# Patient Record
Sex: FEMALE | Race: BLACK OR AFRICAN AMERICAN | Employment: OTHER | URBAN - NONMETROPOLITAN AREA
[De-identification: names, ages, dates, MRNs, and addresses within clinical notes are randomized per-mention and may not be internally consistent; named-entity substitution may affect disease eponyms.]

---

## 2018-05-18 ENCOUNTER — HOSPITAL ENCOUNTER (OUTPATIENT)
Dept: WOMENS IMAGING | Age: 73
Discharge: HOME | End: 2018-05-18
Payer: MEDICARE

## 2018-05-18 DIAGNOSIS — Z12.31 VISIT FOR SCREENING MAMMOGRAM: ICD-10-CM

## 2018-05-18 PROCEDURE — 77063 BREAST TOMOSYNTHESIS BI: CPT

## 2018-05-24 ENCOUNTER — HOSPITAL ENCOUNTER (OUTPATIENT)
Dept: WOMENS IMAGING | Age: 73
Discharge: HOME | End: 2018-05-24
Payer: MEDICARE

## 2018-05-24 DIAGNOSIS — R92.1 BREAST CALCIFICATIONS: ICD-10-CM

## 2018-05-24 PROCEDURE — 77065 DX MAMMO INCL CAD UNI: CPT

## 2018-06-14 ENCOUNTER — HOSPITAL ENCOUNTER (OUTPATIENT)
Dept: WOMENS IMAGING | Age: 73
Discharge: HOME | End: 2018-06-14
Payer: MEDICARE

## 2018-06-14 VITALS
DIASTOLIC BLOOD PRESSURE: 78 MMHG | TEMPERATURE: 98 F | SYSTOLIC BLOOD PRESSURE: 141 MMHG | RESPIRATION RATE: 16 BRPM | HEART RATE: 71 BPM

## 2018-06-14 DIAGNOSIS — R92.1 CALCIFICATION OF RIGHT BREAST: ICD-10-CM

## 2018-06-14 PROCEDURE — G0279 TOMOSYNTHESIS, MAMMO: HCPCS

## 2018-06-14 PROCEDURE — A4648 IMPLANTABLE TISSUE MARKER: HCPCS

## 2018-06-14 PROCEDURE — 88305 TISSUE EXAM BY PATHOLOGIST: CPT

## 2018-06-14 PROCEDURE — 19081 BX BREAST 1ST LESION STRTCTC: CPT

## 2018-06-14 PROCEDURE — 2720000010 HC SURG SUPPLY STERILE

## 2019-04-12 ENCOUNTER — HOSPITAL ENCOUNTER (OUTPATIENT)
Age: 74
Setting detail: SPECIMEN
Discharge: HOME | End: 2019-04-12
Payer: MEDICARE

## 2019-04-12 LAB
ABSOLUTE EOS #: 0.03 K/UL (ref 0–0.44)
ABSOLUTE IMMATURE GRANULOCYTE: <0.03 K/UL (ref 0–0.3)
ABSOLUTE LYMPH #: 1.82 K/UL (ref 1.1–3.7)
ABSOLUTE MONO #: 0.31 K/UL (ref 0.1–1.2)
ALBUMIN SERPL-MCNC: 4.3 G/DL (ref 3.5–5.2)
ALBUMIN/GLOBULIN RATIO: 0.8 (ref 1–2.5)
ALP BLD-CCNC: 114 U/L (ref 35–104)
ALT SERPL-CCNC: 30 U/L (ref 5–33)
ANION GAP SERPL CALCULATED.3IONS-SCNC: 12 MMOL/L (ref 9–17)
AST SERPL-CCNC: 62 U/L
BASOPHILS # BLD: 0 % (ref 0–2)
BASOPHILS ABSOLUTE: <0.03 K/UL (ref 0–0.2)
BILIRUB SERPL-MCNC: 0.27 MG/DL (ref 0.3–1.2)
BUN BLDV-MCNC: 10 MG/DL (ref 8–23)
BUN/CREAT BLD: ABNORMAL (ref 9–20)
CALCIUM SERPL-MCNC: 9.7 MG/DL (ref 8.6–10.4)
CHLORIDE BLD-SCNC: 102 MMOL/L (ref 98–107)
CHOLESTEROL/HDL RATIO: 2.1
CHOLESTEROL: 178 MG/DL
CO2: 26 MMOL/L (ref 20–31)
CREAT SERPL-MCNC: 0.79 MG/DL (ref 0.5–0.9)
DIFFERENTIAL TYPE: ABNORMAL
EOSINOPHILS RELATIVE PERCENT: 1 % (ref 1–4)
GFR AFRICAN AMERICAN: >60 ML/MIN
GFR NON-AFRICAN AMERICAN: >60 ML/MIN
GFR SERPL CREATININE-BSD FRML MDRD: ABNORMAL ML/MIN/{1.73_M2}
GFR SERPL CREATININE-BSD FRML MDRD: ABNORMAL ML/MIN/{1.73_M2}
GLUCOSE BLD-MCNC: 94 MG/DL (ref 70–99)
HCT VFR BLD CALC: 46.9 % (ref 36.3–47.1)
HDLC SERPL-MCNC: 83 MG/DL
HEMOGLOBIN: 14.5 G/DL (ref 11.9–15.1)
IMMATURE GRANULOCYTES: 0 %
LDL CHOLESTEROL: 81 MG/DL (ref 0–130)
LYMPHOCYTES # BLD: 40 % (ref 24–43)
MCH RBC QN AUTO: 29.7 PG (ref 25.2–33.5)
MCHC RBC AUTO-ENTMCNC: 30.9 G/DL (ref 28.4–34.8)
MCV RBC AUTO: 95.9 FL (ref 82.6–102.9)
MONOCYTES # BLD: 7 % (ref 3–12)
NRBC AUTOMATED: 0 PER 100 WBC
PDW BLD-RTO: 15.8 % (ref 11.8–14.4)
PLATELET # BLD: ABNORMAL K/UL (ref 138–453)
PLATELET ESTIMATE: ABNORMAL
PLATELET, FLUORESCENCE: 94 K/UL (ref 138–453)
PLATELET, IMMATURE FRACTION: 12.9 % (ref 1.1–10.3)
PMV BLD AUTO: ABNORMAL FL (ref 8.1–13.5)
POTASSIUM SERPL-SCNC: 4.9 MMOL/L (ref 3.7–5.3)
RBC # BLD: 4.89 M/UL (ref 3.95–5.11)
RBC # BLD: ABNORMAL 10*6/UL
SEG NEUTROPHILS: 52 % (ref 36–65)
SEGMENTED NEUTROPHILS ABSOLUTE COUNT: 2.35 K/UL (ref 1.5–8.1)
SODIUM BLD-SCNC: 140 MMOL/L (ref 135–144)
TOTAL PROTEIN: 9.6 G/DL (ref 6.4–8.3)
TRIGL SERPL-MCNC: 69 MG/DL
TSH SERPL DL<=0.05 MIU/L-ACNC: 1.7 MIU/L (ref 0.3–5)
VLDLC SERPL CALC-MCNC: NORMAL MG/DL (ref 1–30)
WBC # BLD: 4.5 K/UL (ref 3.5–11.3)
WBC # BLD: ABNORMAL 10*3/UL

## 2019-05-30 ENCOUNTER — HOSPITAL ENCOUNTER (OUTPATIENT)
Dept: WOMENS IMAGING | Age: 74
Discharge: HOME | End: 2019-05-30
Payer: MEDICARE

## 2019-05-30 DIAGNOSIS — Z12.31 VISIT FOR SCREENING MAMMOGRAM: ICD-10-CM

## 2019-05-30 PROCEDURE — 77063 BREAST TOMOSYNTHESIS BI: CPT

## 2019-06-06 ENCOUNTER — APPOINTMENT (OUTPATIENT)
Dept: WOMENS IMAGING | Age: 74
End: 2019-06-06
Payer: MEDICARE

## 2019-06-06 ENCOUNTER — HOSPITAL ENCOUNTER (OUTPATIENT)
Dept: WOMENS IMAGING | Age: 74
Discharge: HOME | End: 2019-06-06
Payer: MEDICARE

## 2019-06-06 DIAGNOSIS — R92.2 BREAST DENSITY: ICD-10-CM

## 2019-06-06 PROCEDURE — G0279 TOMOSYNTHESIS, MAMMO: HCPCS

## 2019-10-11 ENCOUNTER — HOSPITAL ENCOUNTER (OUTPATIENT)
Age: 74
Setting detail: SPECIMEN
Discharge: HOME | End: 2019-10-11
Payer: MEDICARE

## 2019-10-11 LAB
ABSOLUTE EOS #: <0.03 K/UL (ref 0–0.44)
ABSOLUTE IMMATURE GRANULOCYTE: <0.03 K/UL (ref 0–0.3)
ABSOLUTE LYMPH #: 1.72 K/UL (ref 1.1–3.7)
ABSOLUTE MONO #: 0.42 K/UL (ref 0.1–1.2)
ANION GAP SERPL CALCULATED.3IONS-SCNC: 12 MMOL/L (ref 9–17)
BASOPHILS # BLD: 0 % (ref 0–2)
BASOPHILS ABSOLUTE: <0.03 K/UL (ref 0–0.2)
BUN BLDV-MCNC: 8 MG/DL (ref 8–23)
BUN/CREAT BLD: NORMAL (ref 9–20)
CALCIUM SERPL-MCNC: 9.3 MG/DL (ref 8.6–10.4)
CHLORIDE BLD-SCNC: 103 MMOL/L (ref 98–107)
CHOLESTEROL/HDL RATIO: 2.6
CHOLESTEROL: 183 MG/DL
CO2: 25 MMOL/L (ref 20–31)
CREAT SERPL-MCNC: 0.77 MG/DL (ref 0.5–0.9)
DIFFERENTIAL TYPE: ABNORMAL
EOSINOPHILS RELATIVE PERCENT: 0 % (ref 1–4)
GFR AFRICAN AMERICAN: >60 ML/MIN
GFR NON-AFRICAN AMERICAN: >60 ML/MIN
GFR SERPL CREATININE-BSD FRML MDRD: NORMAL ML/MIN/{1.73_M2}
GFR SERPL CREATININE-BSD FRML MDRD: NORMAL ML/MIN/{1.73_M2}
GLUCOSE BLD-MCNC: 88 MG/DL (ref 70–99)
HCT VFR BLD CALC: 43.9 % (ref 36.3–47.1)
HDLC SERPL-MCNC: 71 MG/DL
HEMOGLOBIN: 13.4 G/DL (ref 11.9–15.1)
IMMATURE GRANULOCYTES: 0 %
LDL CHOLESTEROL: 94 MG/DL (ref 0–130)
LYMPHOCYTES # BLD: 35 % (ref 24–43)
MCH RBC QN AUTO: 29.6 PG (ref 25.2–33.5)
MCHC RBC AUTO-ENTMCNC: 30.5 G/DL (ref 28.4–34.8)
MCV RBC AUTO: 96.9 FL (ref 82.6–102.9)
MONOCYTES # BLD: 9 % (ref 3–12)
NRBC AUTOMATED: 0 PER 100 WBC
PDW BLD-RTO: 16.1 % (ref 11.8–14.4)
PLATELET # BLD: ABNORMAL K/UL (ref 138–453)
PLATELET ESTIMATE: ABNORMAL
PLATELET, FLUORESCENCE: 88 K/UL (ref 138–453)
PLATELET, IMMATURE FRACTION: 13.4 % (ref 1.1–10.3)
PMV BLD AUTO: ABNORMAL FL (ref 8.1–13.5)
POTASSIUM SERPL-SCNC: 4.3 MMOL/L (ref 3.7–5.3)
RBC # BLD: 4.53 M/UL (ref 3.95–5.11)
RBC # BLD: ABNORMAL 10*6/UL
SEG NEUTROPHILS: 56 % (ref 36–65)
SEGMENTED NEUTROPHILS ABSOLUTE COUNT: 2.74 K/UL (ref 1.5–8.1)
SODIUM BLD-SCNC: 140 MMOL/L (ref 135–144)
TRIGL SERPL-MCNC: 90 MG/DL
TSH SERPL DL<=0.05 MIU/L-ACNC: 1.58 MIU/L (ref 0.3–5)
VLDLC SERPL CALC-MCNC: NORMAL MG/DL (ref 1–30)
WBC # BLD: 4.9 K/UL (ref 3.5–11.3)
WBC # BLD: ABNORMAL 10*3/UL

## 2021-07-15 ENCOUNTER — HOSPITAL ENCOUNTER (OUTPATIENT)
Age: 76
Setting detail: SPECIMEN
Discharge: HOME | End: 2021-07-15
Payer: MEDICARE

## 2021-07-15 LAB
ABSOLUTE EOS #: <0.03 K/UL (ref 0–0.44)
ABSOLUTE IMMATURE GRANULOCYTE: <0.03 K/UL (ref 0–0.3)
ABSOLUTE LYMPH #: 1.53 K/UL (ref 1.1–3.7)
ABSOLUTE MONO #: 0.32 K/UL (ref 0.1–1.2)
ALBUMIN SERPL-MCNC: 3.8 G/DL (ref 3.5–5.2)
ALBUMIN/GLOBULIN RATIO: 0.7 (ref 1–2.5)
ALP BLD-CCNC: 103 U/L (ref 35–104)
ALT SERPL-CCNC: 14 U/L (ref 5–33)
ANION GAP SERPL CALCULATED.3IONS-SCNC: 10 MMOL/L (ref 9–17)
AST SERPL-CCNC: 35 U/L
BASOPHILS # BLD: 1 % (ref 0–2)
BASOPHILS ABSOLUTE: <0.03 K/UL (ref 0–0.2)
BILIRUB SERPL-MCNC: 0.4 MG/DL (ref 0.3–1.2)
BILIRUBIN DIRECT: 0.11 MG/DL
BILIRUBIN, INDIRECT: 0.29 MG/DL (ref 0–1)
BUN BLDV-MCNC: 12 MG/DL (ref 8–23)
BUN/CREAT BLD: ABNORMAL (ref 9–20)
CALCIUM SERPL-MCNC: 9.7 MG/DL (ref 8.6–10.4)
CHLORIDE BLD-SCNC: 100 MMOL/L (ref 98–107)
CHOLESTEROL/HDL RATIO: 2.7
CHOLESTEROL: 193 MG/DL
CO2: 25 MMOL/L (ref 20–31)
CREAT SERPL-MCNC: 1.16 MG/DL (ref 0.5–0.9)
DIFFERENTIAL TYPE: ABNORMAL
EOSINOPHILS RELATIVE PERCENT: 1 % (ref 1–4)
GFR AFRICAN AMERICAN: 55 ML/MIN
GFR NON-AFRICAN AMERICAN: 46 ML/MIN
GFR SERPL CREATININE-BSD FRML MDRD: ABNORMAL ML/MIN/{1.73_M2}
GFR SERPL CREATININE-BSD FRML MDRD: ABNORMAL ML/MIN/{1.73_M2}
GLOBULIN: ABNORMAL G/DL (ref 1.5–3.8)
GLUCOSE BLD-MCNC: 88 MG/DL (ref 70–99)
HCT VFR BLD CALC: 45.3 % (ref 36.3–47.1)
HDLC SERPL-MCNC: 72 MG/DL
HEMOGLOBIN: 13.7 G/DL (ref 11.9–15.1)
HIV AG/AB: NONREACTIVE
IMMATURE GRANULOCYTES: 0 %
LDL CHOLESTEROL: 98 MG/DL (ref 0–130)
LYMPHOCYTES # BLD: 38 % (ref 24–43)
MCH RBC QN AUTO: 29.3 PG (ref 25.2–33.5)
MCHC RBC AUTO-ENTMCNC: 30.2 G/DL (ref 28.4–34.8)
MCV RBC AUTO: 96.8 FL (ref 82.6–102.9)
MONOCYTES # BLD: 8 % (ref 3–12)
NRBC AUTOMATED: 0 PER 100 WBC
PDW BLD-RTO: 15.4 % (ref 11.8–14.4)
PLATELET # BLD: ABNORMAL K/UL (ref 138–453)
PLATELET ESTIMATE: ABNORMAL
PLATELET, FLUORESCENCE: 79 K/UL (ref 138–453)
PLATELET, IMMATURE FRACTION: 13.9 % (ref 1.1–10.3)
PMV BLD AUTO: ABNORMAL FL (ref 8.1–13.5)
POTASSIUM SERPL-SCNC: 4.7 MMOL/L (ref 3.7–5.3)
RBC # BLD: 4.68 M/UL (ref 3.95–5.11)
RBC # BLD: ABNORMAL 10*6/UL
SEDIMENTATION RATE, ERYTHROCYTE: 77 MM (ref 0–30)
SEG NEUTROPHILS: 53 % (ref 36–65)
SEGMENTED NEUTROPHILS ABSOLUTE COUNT: 2.16 K/UL (ref 1.5–8.1)
SODIUM BLD-SCNC: 135 MMOL/L (ref 135–144)
TOTAL PROTEIN: 9.2 G/DL (ref 6.4–8.3)
TRIGL SERPL-MCNC: 115 MG/DL
TSH SERPL DL<=0.05 MIU/L-ACNC: 1.25 MIU/L (ref 0.3–5)
VLDLC SERPL CALC-MCNC: NORMAL MG/DL (ref 1–30)
WBC # BLD: 4.1 K/UL (ref 3.5–11.3)
WBC # BLD: ABNORMAL 10*3/UL

## 2021-08-19 ENCOUNTER — OFFICE VISIT (OUTPATIENT)
Dept: NEPHROLOGY | Age: 76
End: 2021-08-19
Payer: MEDICARE

## 2021-08-19 VITALS
BODY MASS INDEX: 19.06 KG/M2 | WEIGHT: 107.6 LBS | DIASTOLIC BLOOD PRESSURE: 97 MMHG | OXYGEN SATURATION: 94 % | TEMPERATURE: 98 F | SYSTOLIC BLOOD PRESSURE: 167 MMHG | HEART RATE: 90 BPM

## 2021-08-19 DIAGNOSIS — N18.31 STAGE 3A CHRONIC KIDNEY DISEASE (HCC): Primary | ICD-10-CM

## 2021-08-19 DIAGNOSIS — I10 ESSENTIAL HYPERTENSION: ICD-10-CM

## 2021-08-19 PROCEDURE — 4040F PNEUMOC VAC/ADMIN/RCVD: CPT | Performed by: INTERNAL MEDICINE

## 2021-08-19 PROCEDURE — 4004F PT TOBACCO SCREEN RCVD TLK: CPT | Performed by: INTERNAL MEDICINE

## 2021-08-19 PROCEDURE — G8427 DOCREV CUR MEDS BY ELIG CLIN: HCPCS | Performed by: INTERNAL MEDICINE

## 2021-08-19 PROCEDURE — 1123F ACP DISCUSS/DSCN MKR DOCD: CPT | Performed by: INTERNAL MEDICINE

## 2021-08-19 PROCEDURE — G8400 PT W/DXA NO RESULTS DOC: HCPCS | Performed by: INTERNAL MEDICINE

## 2021-08-19 PROCEDURE — 3017F COLORECTAL CA SCREEN DOC REV: CPT | Performed by: INTERNAL MEDICINE

## 2021-08-19 PROCEDURE — G8420 CALC BMI NORM PARAMETERS: HCPCS | Performed by: INTERNAL MEDICINE

## 2021-08-19 PROCEDURE — 1090F PRES/ABSN URINE INCON ASSESS: CPT | Performed by: INTERNAL MEDICINE

## 2021-08-19 PROCEDURE — 99204 OFFICE O/P NEW MOD 45 MIN: CPT | Performed by: INTERNAL MEDICINE

## 2021-08-19 RX ORDER — POLYETHYLENE GLYCOL 3350 17 G
POWDER IN PACKET (EA) ORAL
COMMUNITY
Start: 2021-07-15

## 2021-08-19 RX ORDER — HYDROXYZINE PAMOATE 25 MG/1
CAPSULE ORAL
COMMUNITY
Start: 2021-07-15

## 2021-08-19 NOTE — PROGRESS NOTES
Nephrology Consult Note  Patient's Name: Alex Murray  2:57 PM  8/19/2021    Nephrologist: Tia Lawrence    Reason for Consult:  Elevated serum creatinine level  Requesting Physician:  No att. providers found  PCP: JARROD Ramirez NP    Chief Complaint: None  Assessment   Diagnosis Orders   1. Stage 3a chronic kidney disease (HCC)  Basic Metabolic Panel    Vitamin D 25 Hydroxy    PTH, Intact    Protein / creatinine ratio, urine   2. Essential hypertension           Plan    1. I discussed my thoughts at length with the patient. 2. She appeared to have understood. 3. Addressed her questions  4. She likely has chronic kidney disease from hypertensive nephrosclerosis. .  There may be some prerenal component. 5. We will check markers of chronicity. 6. Random urine protein creatinine ratio. 7. Baseline kidney ultrasound. 8. Avoid any nonsteroidal anti-inflammatory drugs. 9. List provided to the patient. 10. I reassured her that her chronic kidney disease is mild and her kidney function is expected to remain the same  11. Return visit in 4 weeks with a repeat BMP. 12.  I did advise her to increase her fluid intake. 13. Return visit in 4 weeks       History Obtained From:  patient  History of Present Ilness:    Alex Murray is a 77 y.o. female with Ms. Saira Ornelas with history of hypertension going back to the 1970s referred to our office because of elevated serum creatinine level. Review of her record reveals serum creatinine ranging between 0.77 mg/dL up to 0.8 mg/dL since July 2016. On 15 July 2021 BMP was done that revealed a serum creatinine of 1.16 mg/dL. The normal for the lab is up to 0.9 mg/dL. GFR also declined from 60mg to 46 mL/min. As a result she was asked to see us. No chest pain. She does have shortness of breath with activity relieved by rest.  No difficulties with urination. She is currently on amlodipine and benazepril which she has been for several years now.   Blood pressure was quite high on arrival here. Blood pressure now is improved with systolic of 329 and diastolic of  90 mmHg. ..  . Past Medical History:   Diagnosis Date    Hypertension        Past Surgical History:   Procedure Laterality Date    CHOLECYSTECTOMY      COLONOSCOPY      TUBAL LIGATION         Family History   Problem Relation Age of Onset    Hypertension Sister     Hypertension Brother     Cancer Neg Hx     Breast Cancer Neg Hx     Colon Cancer Neg Hx         reports that she has been smoking cigarettes. She has been smoking about 0.25 packs per day. She has never used smokeless tobacco. She reports that she does not drink alcohol and does not use drugs. Allergies:  Patient has no known allergies. Current Medications:    Current Outpatient Medications   Medication Sig Dispense Refill    hydrOXYzine (VISTARIL) 25 MG capsule TAKE 1 CAPSULE BY MOUTH EVERY 8 HOURS AS NEEDED FOR ANXIETY AND FOR SLEEP      nicotine polacrilex (EQ NICOTINE) 4 MG lozenge Take by mouth      albuterol sulfate HFA (VENTOLIN HFA) 108 (90 BASE) MCG/ACT inhaler Inhale 1-2 puffs into the lungs every 6 hours as needed for Wheezing 1 Inhaler 3    amLODIPine (NORVASC) 5 MG tablet Take 5 mg by mouth daily      benazepril (LOTENSIN) 20 MG tablet Take 20 mg by mouth daily      aspirin 81 MG chewable tablet Take 81 mg by mouth daily       No current facility-administered medications for this visit. No current facility-administered medications for this visit. Review of Systems:   Review of Systems   Pertinent positives stated above in HPI. All other systems were reviewed and were negative. ROS:As in HPI on the last patient showed up    Physical exam:   Physical Exam   Constitutional:  Well developed elderly lady in no distress  Vitals:   Skin: no rash, turgor is normal  Heent: Pupils are reactive to light. Throat is clear.   Oral mucosa is moist.  Neck: no bruits or jvd noted   Cardiovascular:  S1, S2 without murmur   Respiratory: Clear with no wheezes,rhonchi or rales   Abdomen: soft,non tender,good bowel sound and no palpable mass  Ext: No lower extremity edema  Psychiatric: mood and affect appropriate  Musculoskeletal:  Rom, muscular strength intact   CNS: Very awake and very alert. Well-oriented. Normal speech. Good motor strength. No obvious focal deficit. Data:   Labs:  Lab Results   Component Value Date     07/15/2021     10/11/2019     04/12/2019    K 4.7 07/15/2021    K 4.3 10/11/2019    K 4.9 04/12/2019     07/15/2021    CO2 25 07/15/2021    CO2 25 10/11/2019    CO2 26 04/12/2019    CREATININE 1.16 (H) 07/15/2021    CREATININE 0.77 10/11/2019    CREATININE 0.79 04/12/2019    BUN 12 07/15/2021    BUN 8 10/11/2019    BUN 10 04/12/2019    GLUCOSE 88 07/15/2021    GLUCOSE 88 10/11/2019    GLUCOSE 94 04/12/2019    WBC 4.1 07/15/2021    WBC 4.9 10/11/2019    WBC 4.5 04/12/2019    HGB 13.7 07/15/2021    HGB 13.4 10/11/2019    HGB 14.5 04/12/2019    HCT 45.3 07/15/2021    HCT 43.9 10/11/2019    HCT 46.9 04/12/2019    MCV 96.8 07/15/2021    PLT See Reflexed IPF Result 07/15/2021     {Labs reviewed    Imaging:  CXR results: Thank you JARROD Maza - NP for allowing us to participate in care of Marva Lewis   **This report has been created using voice recognition software. It maycontain minor  errors which are inherent in voice recognition technology. **    Electronically signed by Narayan Corcoran MD on 8/19/2021 at 2:57 PM

## 2021-09-09 ENCOUNTER — HOSPITAL ENCOUNTER (OUTPATIENT)
Age: 76
Discharge: HOME | End: 2021-09-09
Payer: MEDICARE

## 2021-09-09 DIAGNOSIS — N18.31 STAGE 3A CHRONIC KIDNEY DISEASE (HCC): ICD-10-CM

## 2021-09-09 LAB
ANION GAP SERPL CALCULATED.3IONS-SCNC: 8 MEQ/L (ref 8–16)
BUN BLDV-MCNC: 12 MG/DL (ref 7–22)
CALCIUM SERPL-MCNC: 9.9 MG/DL (ref 8.5–10.5)
CHLORIDE BLD-SCNC: 101 MEQ/L (ref 98–111)
CO2: 28 MEQ/L (ref 23–33)
CREAT SERPL-MCNC: 1.2 MG/DL (ref 0.4–1.2)
CREATININE URINE: 120.7 MG/DL
GLUCOSE BLD-MCNC: 109 MG/DL (ref 70–108)
POTASSIUM SERPL-SCNC: 4.4 MEQ/L (ref 3.5–5.2)
PROT/CREAT RATIO, UR: 0.32
PROTEIN, URINE: 38.1 MG/DL
PTH INTACT: 48.4 PG/ML (ref 15–65)
SODIUM BLD-SCNC: 137 MEQ/L (ref 135–145)
VITAMIN D 25-HYDROXY: 27 NG/ML (ref 30–100)

## 2021-09-09 PROCEDURE — 82570 ASSAY OF URINE CREATININE: CPT

## 2021-09-09 PROCEDURE — 83970 ASSAY OF PARATHORMONE: CPT

## 2021-09-09 PROCEDURE — 84156 ASSAY OF PROTEIN URINE: CPT

## 2021-09-09 PROCEDURE — 36415 COLL VENOUS BLD VENIPUNCTURE: CPT

## 2021-09-09 PROCEDURE — 82306 VITAMIN D 25 HYDROXY: CPT

## 2021-09-09 PROCEDURE — 80048 BASIC METABOLIC PNL TOTAL CA: CPT

## 2021-09-16 ENCOUNTER — OFFICE VISIT (OUTPATIENT)
Dept: NEPHROLOGY | Age: 76
End: 2021-09-16
Payer: MEDICARE

## 2021-09-16 VITALS
BODY MASS INDEX: 18.6 KG/M2 | DIASTOLIC BLOOD PRESSURE: 90 MMHG | SYSTOLIC BLOOD PRESSURE: 159 MMHG | OXYGEN SATURATION: 98 % | TEMPERATURE: 98.3 F | WEIGHT: 105 LBS | HEART RATE: 90 BPM

## 2021-09-16 DIAGNOSIS — I10 ESSENTIAL HYPERTENSION: ICD-10-CM

## 2021-09-16 DIAGNOSIS — E55.9 VITAMIN D DEFICIENCY: ICD-10-CM

## 2021-09-16 DIAGNOSIS — N18.31 STAGE 3A CHRONIC KIDNEY DISEASE (HCC): Primary | ICD-10-CM

## 2021-09-16 PROCEDURE — 4004F PT TOBACCO SCREEN RCVD TLK: CPT | Performed by: INTERNAL MEDICINE

## 2021-09-16 PROCEDURE — 3017F COLORECTAL CA SCREEN DOC REV: CPT | Performed by: INTERNAL MEDICINE

## 2021-09-16 PROCEDURE — G8427 DOCREV CUR MEDS BY ELIG CLIN: HCPCS | Performed by: INTERNAL MEDICINE

## 2021-09-16 PROCEDURE — 1123F ACP DISCUSS/DSCN MKR DOCD: CPT | Performed by: INTERNAL MEDICINE

## 2021-09-16 PROCEDURE — 1090F PRES/ABSN URINE INCON ASSESS: CPT | Performed by: INTERNAL MEDICINE

## 2021-09-16 PROCEDURE — 4040F PNEUMOC VAC/ADMIN/RCVD: CPT | Performed by: INTERNAL MEDICINE

## 2021-09-16 PROCEDURE — G8420 CALC BMI NORM PARAMETERS: HCPCS | Performed by: INTERNAL MEDICINE

## 2021-09-16 PROCEDURE — 99213 OFFICE O/P EST LOW 20 MIN: CPT | Performed by: INTERNAL MEDICINE

## 2021-09-16 PROCEDURE — G8400 PT W/DXA NO RESULTS DOC: HCPCS | Performed by: INTERNAL MEDICINE

## 2021-09-16 NOTE — PROGRESS NOTES
Renal Progress Note    Assessment and Plan:      Diagnosis Orders   1. Stage 3a chronic kidney disease ([facility] [address]     2. Essential hypertension     3. Vitamin D deficiency       PLAN:  I discussed my thoughts with the patient. She understood. Lab result reviewed with her in epic together  Serum creatinine is stable at 1.2 mg/dL  Vitamin D level is slightly low at 27. PTH is normal at 48.4. Kidney ultrasound was not done as instructed. We will reschedule her for such and call her with the report when available  Vitamin D3 over-the-counter 2000 international unit 1 capsule a day. Printed instruction provided to the patient. Return visit in 12 months    Patient Active Problem List   Diagnosis    Anxiety disorder    Chronic obstructive pulmonary disease ([facility] [address]    Essential hypertension           Subjective:   Chief complaint:  Chief Complaint   Patient presents with    Chronic Kidney Disease     Stage IIIb      HPI:This is a follow up visit for Ms. Caren Weaver who is here today for return appointment. I saw her about 4 weeks ago for chronic kidney disease. Doing well since then with no complaint. .  Denies any chest pain. No shortness of breath. No nausea or vomiting. No fever chills. No headaches. Blood pressure is high here in the office today but generally is within the acceptable range    ROS:  Pertinent positives stated above in HPI. All other systems were reviewed and were negative.   Medications:     Current Outpatient Medications   Medication Sig Dispense Refill    hydrOXYzine (VISTARIL) 25 MG capsule TAKE 1 CAPSULE BY MOUTH EVERY 8 HOURS AS NEEDED FOR ANXIETY AND FOR SLEEP      nicotine polacrilex (EQ NICOTINE) 4 MG lozenge Take by mouth      albuterol sulfate HFA (VENTOLIN HFA) 108 (90 BASE) MCG/ACT inhaler Inhale 1-2 puffs into the lungs every 6 hours as needed for Wheezing 1 Inhaler 3    amLODIPine (NORVASC) 5 MG tablet Take 5 mg by mouth daily      benazepril (LOTENSIN) 20 MG tablet Take 20 mg by mouth daily      aspirin 81 MG chewable tablet Take 81 mg by mouth daily       No current facility-administered medications for this visit.        Lab Results:    CBC:   Lab Results   Component Value Date    WBC 4.1 07/15/2021    HGB 13.7 07/15/2021    HCT 45.3 07/15/2021    MCV 96.8 07/15/2021    PLT See Reflexed IPF Result 07/15/2021     BMP:    Lab Results   Component Value Date     09/09/2021     07/15/2021     10/11/2019    K 4.4 09/09/2021    K 4.7 07/15/2021    K 4.3 10/11/2019     09/09/2021     07/15/2021     10/11/2019    CO2 28 09/09/2021    CO2 25 07/15/2021    CO2 25 10/11/2019    BUN 12 09/09/2021    BUN 12 07/15/2021    BUN 8 10/11/2019    CREATININE 1.2 09/09/2021    CREATININE 1.16 (H) 07/15/2021    CREATININE 0.77 10/11/2019    GLUCOSE 109 (H) 09/09/2021    GLUCOSE 88 07/15/2021    GLUCOSE 88 10/11/2019      Hepatic:   Lab Results   Component Value Date    AST 35 (H) 07/15/2021    AST 62 (H) 04/12/2019    ALT 14 07/15/2021    ALT 30 04/12/2019    BILITOT 0.40 07/15/2021    BILITOT 0.27 (L) 04/12/2019    ALKPHOS 103 07/15/2021    ALKPHOS 114 (H) 04/12/2019     BNP: No results found for: BNP  Lipids:   Lab Results   Component Value Date    CHOL 193 07/15/2021    HDL 72 07/15/2021     INR: No results found for: INR  URINE:   Lab Results   Component Value Date    PROTUR 38.1 09/09/2021     Lab Results   Component Value Date    NITRU NEGATIVE 11/14/2016    COLORU YELLOW 11/14/2016    PHUR 6.5 11/14/2016    LEUKOCYTESUR NEGATIVE 11/14/2016    UROBILINOGEN 0.2 11/14/2016    BILIRUBINUR NEGATIVE 11/14/2016    BLOODU NEGATIVE 11/14/2016    GLUCOSEU NEGATIVE 11/14/2016    KETUA NEGATIVE 11/14/2016      Microalbumen/Creatinine ratio:  No components found for: RUCREAT    Objective:   Vitals: BP (!) 159/90 (Site: Left Upper Arm, Position: Sitting, Cuff Size: Medium Adult)   Pulse 90   Temp 98.3 °F (36.8 °C)   Wt 105 lb (47.6 kg)   SpO2 98%   BMI 18.60 kg/m² Constitutional:  Alert, awake, no apparent distress  Skin:normal with no rash or any lesions  HEENT:Pupils are reactive . Throat is clear. Oral mucosa is moist.  Neck:supple with no thyromegaly or bruit. Blood pressure is high in the office  Cardiovascular:  S1, S2 without murmur   Respiratory:  Clear to auscultation with no wheezes or rales  Abdomen: +bowel sound, soft, non tender and no bruit  Ext: No LE edema  Musculoskeletal:Intact  Neuro:Alert, awake and oriented with no obvious focal deficit. Speech is normal.    Electronically signed by Stephanie Mina MD on 9/16/2021 at 2:39 PM   **This report has been created using voice recognition software. It maycontain minor  errors which are inherent in voice recognition technology. **

## 2021-09-22 ENCOUNTER — HOSPITAL ENCOUNTER (OUTPATIENT)
Dept: ULTRASOUND IMAGING | Age: 76
Discharge: HOME | End: 2021-09-22
Payer: MEDICARE

## 2021-09-22 DIAGNOSIS — N18.31 STAGE 3A CHRONIC KIDNEY DISEASE (HCC): ICD-10-CM

## 2021-09-22 LAB — GFR SERPL CREATININE-BSD FRML MDRD: 53 ML/MIN/1.73M2

## 2021-09-22 PROCEDURE — 76770 US EXAM ABDO BACK WALL COMP: CPT

## 2021-09-23 ENCOUNTER — TELEPHONE (OUTPATIENT)
Dept: NEPHROLOGY | Age: 76
End: 2021-09-23

## 2022-09-10 LAB
ANION GAP SERPL CALCULATED.3IONS-SCNC: 11 MEQ/L (ref 7–16)
BUN BLDV-MCNC: 11 MG/DL (ref 8–23)
CALCIUM SERPL-MCNC: 9.7 MG/DL (ref 8.5–10.5)
CHLORIDE BLD-SCNC: 101 MEQ/L (ref 95–107)
CO2: 25 MEQ/L (ref 19–31)
CREAT SERPL-MCNC: 1.09 MG/DL (ref 0.6–1.3)
EGFR IF NONAFRICAN AMERICAN: 53 ML/MIN/1.73
GLUCOSE: 101 MG/DL (ref 70–99)
POTASSIUM SERPL-SCNC: 4.4 MEQ/L (ref 3.5–5.4)
SODIUM BLD-SCNC: 137 MEQ/L (ref 133–146)
VITAMIN D 25-HYDROXY: 51 NG/ML

## 2022-09-13 LAB — PARATHYROID HORMONE INTACT: 43 PG/ML (ref 15–65)

## 2022-09-15 ENCOUNTER — OFFICE VISIT (OUTPATIENT)
Dept: NEPHROLOGY | Age: 77
End: 2022-09-15
Payer: MEDICARE

## 2022-09-15 VITALS
SYSTOLIC BLOOD PRESSURE: 142 MMHG | WEIGHT: 105.6 LBS | OXYGEN SATURATION: 94 % | HEART RATE: 96 BPM | DIASTOLIC BLOOD PRESSURE: 74 MMHG | BODY MASS INDEX: 18.71 KG/M2

## 2022-09-15 DIAGNOSIS — N27.0 SMALL LEFT KIDNEY: ICD-10-CM

## 2022-09-15 DIAGNOSIS — N18.31 STAGE 3A CHRONIC KIDNEY DISEASE (HCC): Primary | ICD-10-CM

## 2022-09-15 PROCEDURE — 1123F ACP DISCUSS/DSCN MKR DOCD: CPT | Performed by: INTERNAL MEDICINE

## 2022-09-15 PROCEDURE — 4004F PT TOBACCO SCREEN RCVD TLK: CPT | Performed by: INTERNAL MEDICINE

## 2022-09-15 PROCEDURE — G8420 CALC BMI NORM PARAMETERS: HCPCS | Performed by: INTERNAL MEDICINE

## 2022-09-15 PROCEDURE — G8400 PT W/DXA NO RESULTS DOC: HCPCS | Performed by: INTERNAL MEDICINE

## 2022-09-15 PROCEDURE — 1090F PRES/ABSN URINE INCON ASSESS: CPT | Performed by: INTERNAL MEDICINE

## 2022-09-15 PROCEDURE — 99213 OFFICE O/P EST LOW 20 MIN: CPT | Performed by: INTERNAL MEDICINE

## 2022-09-15 PROCEDURE — G8427 DOCREV CUR MEDS BY ELIG CLIN: HCPCS | Performed by: INTERNAL MEDICINE

## 2022-09-15 RX ORDER — MULTIVIT-MIN/IRON/FOLIC ACID/K 18-600-40
2000 CAPSULE ORAL DAILY
COMMUNITY

## 2022-09-15 NOTE — PROGRESS NOTES
medications for this visit.        Lab Results:    CBC:   Lab Results   Component Value Date    WBC 4.1 07/15/2021    HGB 13.7 07/15/2021    HCT 45.3 07/15/2021    MCV 96.8 07/15/2021    PLT See Reflexed IPF Result 07/15/2021     BMP:    Lab Results   Component Value Date     09/09/2022     09/09/2021     07/15/2021    K 4.4 09/09/2022    K 4.4 09/09/2021    K 4.7 07/15/2021     09/09/2022     09/09/2021     07/15/2021    CO2 25 09/09/2022    CO2 28 09/09/2021    CO2 25 07/15/2021    BUN 11 09/09/2022    BUN 12 09/09/2021    BUN 12 07/15/2021    CREATININE 1.09 09/09/2022    CREATININE 1.2 09/09/2021    CREATININE 1.16 (H) 07/15/2021    GLUCOSE 101 (H) 09/09/2022    GLUCOSE 109 (H) 09/09/2021    GLUCOSE 88 07/15/2021      Hepatic:   Lab Results   Component Value Date    AST 35 (H) 07/15/2021    AST 62 (H) 04/12/2019    ALT 14 07/15/2021    ALT 30 04/12/2019    BILITOT 0.40 07/15/2021    BILITOT 0.27 (L) 04/12/2019    ALKPHOS 103 07/15/2021    ALKPHOS 114 (H) 04/12/2019     BNP: No results found for: BNP  Lipids:   Lab Results   Component Value Date    CHOL 193 07/15/2021    HDL 72 07/15/2021     INR: No results found for: INR  URINE:   Lab Results   Component Value Date/Time    PROTUR 38.1 09/09/2021 09:50 AM     Lab Results   Component Value Date/Time    NITRU NEGATIVE 11/14/2016 06:40 PM    COLORU YELLOW 11/14/2016 06:40 PM    PHUR 6.5 11/14/2016 06:40 PM    LEUKOCYTESUR NEGATIVE 11/14/2016 06:40 PM    UROBILINOGEN 0.2 11/14/2016 06:40 PM    BILIRUBINUR NEGATIVE 11/14/2016 06:40 PM    BLOODU NEGATIVE 11/14/2016 06:40 PM    GLUCOSEU NEGATIVE 11/14/2016 06:40 PM    KETUA NEGATIVE 11/14/2016 06:40 PM      Microalbumen/Creatinine ratio:  No components found for: RUCREAT    Objective:   Vitals: BP (!) 142/74 (Site: Left Upper Arm, Position: Sitting, Cuff Size: Large Adult)   Pulse 96   Wt 105 lb 9.6 oz (47.9 kg)   SpO2 94%   BMI 18.71 kg/m²      Constitutional:  Alert, awake, no apparent distress  Skin:normal with no rash or any significant lesions  HEENT:Pupils are reactive . Throat is clear. Oral mucosa is moist.  Neck:supple with no thyromegaly, JVD, lymphadenopathy or bruit   Cardiovascular: Regular sinus rhythm without murmur, rubs or gallops   Respiratory:  Clear to auscultation with no wheezes or rales  Abdomen: Good bowel sound, soft, non tender and no bruit  Ext: No LE edema  Musculoskeletal:Intact  Neuro:Alert, awake and oriented with no obvious focal deficit. Speech is normal.    Electronically signed by Kanika Cheek MD on 9/15/2022 at 1:10 PM   **This report has been created using voice recognition software. It maycontain minor  errors which are inherent in voice recognition technology. **

## 2023-08-18 ENCOUNTER — HOSPITAL ENCOUNTER (OUTPATIENT)
Age: 78
Setting detail: SPECIMEN
Discharge: HOME | End: 2023-08-18

## 2023-08-18 LAB
ALBUMIN SERPL-MCNC: 4.2 G/DL (ref 3.5–5.2)
ALBUMIN/GLOB SERPL: 0.9 {RATIO} (ref 1–2.5)
ALP SERPL-CCNC: 98 U/L (ref 35–104)
ALT SERPL-CCNC: 15 U/L (ref 5–33)
ANION GAP SERPL CALCULATED.3IONS-SCNC: 13 MMOL/L (ref 9–17)
AST SERPL-CCNC: 38 U/L
BASOPHILS # BLD: <0.03 K/UL (ref 0–0.2)
BASOPHILS NFR BLD: 1 % (ref 0–2)
BILIRUB SERPL-MCNC: 0.4 MG/DL (ref 0.3–1.2)
BUN SERPL-MCNC: 10 MG/DL (ref 8–23)
CALCIUM SERPL-MCNC: 9.7 MG/DL (ref 8.6–10.4)
CHLORIDE SERPL-SCNC: 105 MMOL/L (ref 98–107)
CO2 SERPL-SCNC: 24 MMOL/L (ref 20–31)
CREAT SERPL-MCNC: 1.2 MG/DL (ref 0.5–0.9)
EOSINOPHIL # BLD: <0.03 K/UL (ref 0–0.44)
EOSINOPHILS RELATIVE PERCENT: 1 % (ref 1–4)
ERYTHROCYTE [DISTWIDTH] IN BLOOD BY AUTOMATED COUNT: 15.3 % (ref 11.8–14.4)
GFR SERPL CREATININE-BSD FRML MDRD: 47 ML/MIN/1.73M2
GLUCOSE SERPL-MCNC: 94 MG/DL (ref 70–99)
HCT VFR BLD AUTO: 47.1 % (ref 36.3–47.1)
HGB BLD-MCNC: 14.9 G/DL (ref 11.9–15.1)
IMM GRANULOCYTES # BLD AUTO: <0.03 K/UL (ref 0–0.3)
IMM GRANULOCYTES NFR BLD: 0 %
LYMPHOCYTES NFR BLD: 1.44 K/UL (ref 1.1–3.7)
LYMPHOCYTES RELATIVE PERCENT: 33 % (ref 24–43)
MCH RBC QN AUTO: 30.2 PG (ref 25.2–33.5)
MCHC RBC AUTO-ENTMCNC: 31.6 G/DL (ref 28.4–34.8)
MCV RBC AUTO: 95.3 FL (ref 82.6–102.9)
MONOCYTES NFR BLD: 0.39 K/UL (ref 0.1–1.2)
MONOCYTES NFR BLD: 9 % (ref 3–12)
NEUTROPHILS NFR BLD: 56 % (ref 36–65)
NEUTS SEG NFR BLD: 2.51 K/UL (ref 1.5–8.1)
NRBC BLD-RTO: 0 PER 100 WBC
PLATELET # BLD AUTO: 86 K/UL (ref 138–453)
PMV BLD AUTO: 12.4 FL (ref 8.1–13.5)
POTASSIUM SERPL-SCNC: 4.5 MMOL/L (ref 3.7–5.3)
PROT SERPL-MCNC: 8.8 G/DL (ref 6.4–8.3)
RBC # BLD AUTO: 4.94 M/UL (ref 3.95–5.11)
RBC # BLD: ABNORMAL 10*6/UL
SODIUM SERPL-SCNC: 142 MMOL/L (ref 135–144)
T3 SERPL-MCNC: 160 NG/DL (ref 60–181)
TSH SERPL DL<=0.05 MIU/L-ACNC: 1.61 UIU/ML (ref 0.3–5)
WBC OTHER # BLD: 4.4 K/UL (ref 3.5–11.3)

## 2023-10-09 ENCOUNTER — OFFICE VISIT (OUTPATIENT)
Dept: NEPHROLOGY | Age: 78
End: 2023-10-09
Payer: MEDICARE

## 2023-10-09 VITALS
WEIGHT: 102 LBS | BODY MASS INDEX: 18.07 KG/M2 | SYSTOLIC BLOOD PRESSURE: 123 MMHG | HEIGHT: 63 IN | OXYGEN SATURATION: 96 % | HEART RATE: 82 BPM | DIASTOLIC BLOOD PRESSURE: 75 MMHG

## 2023-10-09 DIAGNOSIS — I10 PRIMARY HYPERTENSION: ICD-10-CM

## 2023-10-09 DIAGNOSIS — N18.31 STAGE 3A CHRONIC KIDNEY DISEASE (HCC): Primary | ICD-10-CM

## 2023-10-09 DIAGNOSIS — E55.9 VITAMIN D DEFICIENCY: ICD-10-CM

## 2023-10-09 PROCEDURE — G8400 PT W/DXA NO RESULTS DOC: HCPCS | Performed by: INTERNAL MEDICINE

## 2023-10-09 PROCEDURE — G8484 FLU IMMUNIZE NO ADMIN: HCPCS | Performed by: INTERNAL MEDICINE

## 2023-10-09 PROCEDURE — G8427 DOCREV CUR MEDS BY ELIG CLIN: HCPCS | Performed by: INTERNAL MEDICINE

## 2023-10-09 PROCEDURE — 4004F PT TOBACCO SCREEN RCVD TLK: CPT | Performed by: INTERNAL MEDICINE

## 2023-10-09 PROCEDURE — 3074F SYST BP LT 130 MM HG: CPT | Performed by: INTERNAL MEDICINE

## 2023-10-09 PROCEDURE — 1090F PRES/ABSN URINE INCON ASSESS: CPT | Performed by: INTERNAL MEDICINE

## 2023-10-09 PROCEDURE — 1123F ACP DISCUSS/DSCN MKR DOCD: CPT | Performed by: INTERNAL MEDICINE

## 2023-10-09 PROCEDURE — G8419 CALC BMI OUT NRM PARAM NOF/U: HCPCS | Performed by: INTERNAL MEDICINE

## 2023-10-09 PROCEDURE — 99213 OFFICE O/P EST LOW 20 MIN: CPT | Performed by: INTERNAL MEDICINE

## 2023-10-09 PROCEDURE — 3078F DIAST BP <80 MM HG: CPT | Performed by: INTERNAL MEDICINE

## 2023-10-09 NOTE — PROGRESS NOTES
Renal Progress Note    Assessment and Plan:      Diagnosis Orders   1. Stage 3a chronic kidney disease ([address])        2. Primary hypertension        3. Vitamin D deficiency                  PLAN:  Lab result reviewed with the patient. Serum creatinine is slightly increased to 1.2 mg/dL from 1.09 mg/dL 12 months ago but is still within her baseline. Serum protein is slightly high at 8.8 g/dL  In normal circumstances one would worry about possible monoclonal gammopathy. I do not believe that is the case here especially since it is chronic. She understood. She has no specific questions. Medications reviewed. Would not change anything  Return visit in 12 months with labs          Patient Active Problem List   Diagnosis    Anxiety disorder    Chronic obstructive pulmonary disease ([address])    Essential hypertension           Subjective:   Chief complaint:  Chief Complaint   Patient presents with    Follow-up     Ckd iiia      HPI:This is a follow up visit for Ms. eGo Mcbride here today for return appointment. I see her for chronic kidney disease among other things. She was last seen about 12 months ago. Doing well with no complaint. Appetite is good. No recent chest pain or shortness of breath. No nausea or vomiting. No hospitalizations since last time she was seen. ROS:  Pertinent positives stated above in HPI. All other systems were reviewed and were negative.   Medications:     Current Outpatient Medications   Medication Sig Dispense Refill    Cholecalciferol (VITAMIN D) 50 MCG (2000 UT) CAPS capsule Take 2,000 Units by mouth daily      nicotine polacrilex (COMMIT) 4 MG lozenge Take by mouth      albuterol sulfate HFA (VENTOLIN HFA) 108 (90 BASE) MCG/ACT inhaler Inhale 1-2 puffs into the lungs every 6 hours as needed for Wheezing 1 Inhaler 3    amLODIPine (NORVASC) 5 MG tablet Take 1 tablet by mouth daily      benazepril (LOTENSIN) 20 MG tablet Take 1 tablet by mouth daily      aspirin 81 MG chewable

## 2024-06-17 ENCOUNTER — APPOINTMENT (OUTPATIENT)
Dept: ULTRASOUND IMAGING | Age: 79
End: 2024-06-17
Payer: MEDICARE

## 2024-06-17 ENCOUNTER — APPOINTMENT (OUTPATIENT)
Dept: GENERAL RADIOLOGY | Age: 79
End: 2024-06-17
Payer: MEDICARE

## 2024-06-17 ENCOUNTER — HOSPITAL ENCOUNTER (INPATIENT)
Age: 79
LOS: 19 days | Discharge: NURSING, REHAB OR LONG-TERM CARE | End: 2024-07-06
Attending: STUDENT IN AN ORGANIZED HEALTH CARE EDUCATION/TRAINING PROGRAM | Admitting: INTERNAL MEDICINE
Payer: MEDICARE

## 2024-06-17 DIAGNOSIS — I47.10 SVT (SUPRAVENTRICULAR TACHYCARDIA) (HCC): Primary | ICD-10-CM

## 2024-06-17 DIAGNOSIS — R91.8 LUNG MASS: ICD-10-CM

## 2024-06-17 DIAGNOSIS — Z77.22 TOBACCO SMOKE EXPOSURE: ICD-10-CM

## 2024-06-17 DIAGNOSIS — I21.3 STEMI (ST ELEVATION MYOCARDIAL INFARCTION) (HCC): ICD-10-CM

## 2024-06-17 DIAGNOSIS — I24.9 ACUTE CORONARY SYNDROME (HCC): ICD-10-CM

## 2024-06-17 DIAGNOSIS — J44.9 SUSPECTED CHRONIC OBSTRUCTIVE PULMONARY DISEASE BASED ON INITIAL EVALUATION (HCC): ICD-10-CM

## 2024-06-17 DIAGNOSIS — R91.8 RIGHT LOWER LOBE LUNG MASS: ICD-10-CM

## 2024-06-17 DIAGNOSIS — I21.3 ST ELEVATION MYOCARDIAL INFARCTION (STEMI), UNSPECIFIED ARTERY (HCC): ICD-10-CM

## 2024-06-17 LAB
ACTIVATED CLOTTING TIME: 287 SECONDS (ref 1–150)
ALBUMIN SERPL BCG-MCNC: 3.2 G/DL (ref 3.5–5.1)
ALP SERPL-CCNC: 74 U/L (ref 38–126)
ALT SERPL W/O P-5'-P-CCNC: 9 U/L (ref 11–66)
ANION GAP SERPL CALC-SCNC: 13 MEQ/L (ref 8–16)
APTT PPP: 27.7 SECONDS (ref 22–38)
AST SERPL-CCNC: 26 U/L (ref 5–40)
BASOPHILS ABSOLUTE: 0 THOU/MM3 (ref 0–0.1)
BASOPHILS NFR BLD AUTO: 0.2 %
BILIRUB CONJ SERPL-MCNC: 0.2 MG/DL (ref 0.1–13.8)
BILIRUB SERPL-MCNC: 0.4 MG/DL (ref 0.3–1.2)
BUN SERPL-MCNC: 18 MG/DL (ref 7–22)
CALCIUM SERPL-MCNC: 9.4 MG/DL (ref 8.5–10.5)
CHLORIDE 24H UR-SRATE: 93 MEQ/L
CHLORIDE SERPL-SCNC: 99 MEQ/L (ref 98–111)
CO2 SERPL-SCNC: 23 MEQ/L (ref 23–33)
CREAT SERPL-MCNC: 1.7 MG/DL (ref 0.4–1.2)
CREAT UR-MCNC: 61.5 MG/DL
DEPRECATED RDW RBC AUTO: 47.5 FL (ref 35–45)
EKG ATRIAL RATE: 159 BPM
EKG P AXIS: 77 DEGREES
EKG P-R INTERVAL: 128 MS
EKG Q-T INTERVAL: 302 MS
EKG Q-T INTERVAL: 360 MS
EKG QRS DURATION: 82 MS
EKG QRS DURATION: 86 MS
EKG QTC CALCULATION (BAZETT): 433 MS
EKG QTC CALCULATION (BAZETT): 471 MS
EKG R AXIS: -8 DEGREES
EKG R AXIS: 43 DEGREES
EKG T AXIS: 24 DEGREES
EKG T AXIS: 95 DEGREES
EKG VENTRICULAR RATE: 103 BPM
EKG VENTRICULAR RATE: 124 BPM
EOSINOPHIL NFR BLD AUTO: 0 %
EOSINOPHIL SMEAR, URINE: NORMAL
EOSINOPHILS ABSOLUTE: 0 THOU/MM3 (ref 0–0.4)
ERYTHROCYTE [DISTWIDTH] IN BLOOD BY AUTOMATED COUNT: 13.7 % (ref 11.5–14.5)
GFR SERPL CREATININE-BSD FRML MDRD: 31 ML/MIN/1.73M2
GLUCOSE SERPL-MCNC: 203 MG/DL (ref 70–108)
HCT VFR BLD AUTO: 41.4 % (ref 37–47)
HEPARIN UNFRACTIONATED: < 0.04 U/ML (ref 0.3–0.7)
HGB BLD-MCNC: 13 GM/DL (ref 12–16)
IMM GRANULOCYTES # BLD AUTO: 0.01 THOU/MM3 (ref 0–0.07)
IMM GRANULOCYTES NFR BLD AUTO: 0.2 %
INR PPP: 1.06 (ref 0.85–1.13)
LYMPHOCYTES ABSOLUTE: 1.5 THOU/MM3 (ref 1–4.8)
LYMPHOCYTES NFR BLD AUTO: 30.7 %
MCH RBC QN AUTO: 29.6 PG (ref 26–33)
MCHC RBC AUTO-ENTMCNC: 31.4 GM/DL (ref 32.2–35.5)
MCV RBC AUTO: 94.3 FL (ref 81–99)
MONOCYTES ABSOLUTE: 0.3 THOU/MM3 (ref 0.4–1.3)
MONOCYTES NFR BLD AUTO: 6.1 %
MRSA DNA SPEC QL NAA+PROBE: NEGATIVE
NEUTROPHILS ABSOLUTE: 3.1 THOU/MM3 (ref 1.8–7.7)
NEUTROPHILS NFR BLD AUTO: 62.8 %
NRBC BLD AUTO-RTO: 0 /100 WBC
NT-PROBNP SERPL IA-MCNC: 5876 PG/ML (ref 0–449)
OSMOLALITY SERPL CALC.SUM OF ELEC: 277.8 MOSMOL/KG (ref 275–300)
PLATELET # BLD AUTO: 152 THOU/MM3 (ref 130–400)
PMV BLD AUTO: 12.5 FL (ref 9.4–12.4)
POTASSIUM SERPL-SCNC: 4.5 MEQ/L (ref 3.5–5.2)
POTASSIUM UR-SCNC: 44.7 MEQ/L
PROT SERPL-MCNC: 7.9 G/DL (ref 6.1–8)
RBC # BLD AUTO: 4.39 MILL/MM3 (ref 4.2–5.4)
SODIUM SERPL-SCNC: 135 MEQ/L (ref 135–145)
SODIUM UR-SCNC: 91 MEQ/L
TROPONIN, HIGH SENSITIVITY: 136 NG/L (ref 0–12)
UUN 24H UR-MCNC: 447 MG/DL
WBC # BLD AUTO: 4.9 THOU/MM3 (ref 4.8–10.8)

## 2024-06-17 PROCEDURE — 99223 1ST HOSP IP/OBS HIGH 75: CPT | Performed by: INTERNAL MEDICINE

## 2024-06-17 PROCEDURE — C9606 PERC D-E COR REVASC W AMI S: HCPCS | Performed by: INTERNAL MEDICINE

## 2024-06-17 PROCEDURE — 2100000000 HC CCU R&B

## 2024-06-17 PROCEDURE — 84484 ASSAY OF TROPONIN QUANT: CPT

## 2024-06-17 PROCEDURE — 85520 HEPARIN ASSAY: CPT

## 2024-06-17 PROCEDURE — 6360000004 HC RX CONTRAST MEDICATION: Performed by: INTERNAL MEDICINE

## 2024-06-17 PROCEDURE — 85610 PROTHROMBIN TIME: CPT

## 2024-06-17 PROCEDURE — 4A023N7 MEASUREMENT OF CARDIAC SAMPLING AND PRESSURE, LEFT HEART, PERCUTANEOUS APPROACH: ICD-10-PCS | Performed by: INTERNAL MEDICINE

## 2024-06-17 PROCEDURE — C1887 CATHETER, GUIDING: HCPCS | Performed by: INTERNAL MEDICINE

## 2024-06-17 PROCEDURE — 6370000000 HC RX 637 (ALT 250 FOR IP)

## 2024-06-17 PROCEDURE — 85025 COMPLETE CBC W/AUTO DIFF WBC: CPT

## 2024-06-17 PROCEDURE — 84133 ASSAY OF URINE POTASSIUM: CPT

## 2024-06-17 PROCEDURE — C1753 CATH, INTRAVAS ULTRASOUND: HCPCS | Performed by: INTERNAL MEDICINE

## 2024-06-17 PROCEDURE — 82570 ASSAY OF URINE CREATININE: CPT

## 2024-06-17 PROCEDURE — 85730 THROMBOPLASTIN TIME PARTIAL: CPT

## 2024-06-17 PROCEDURE — 6360000002 HC RX W HCPCS: Performed by: INTERNAL MEDICINE

## 2024-06-17 PROCEDURE — 92941 PRQ TRLML REVSC TOT OCCL AMI: CPT | Performed by: INTERNAL MEDICINE

## 2024-06-17 PROCEDURE — 99152 MOD SED SAME PHYS/QHP 5/>YRS: CPT | Performed by: INTERNAL MEDICINE

## 2024-06-17 PROCEDURE — 99285 EMERGENCY DEPT VISIT HI MDM: CPT

## 2024-06-17 PROCEDURE — 6370000000 HC RX 637 (ALT 250 FOR IP): Performed by: STUDENT IN AN ORGANIZED HEALTH CARE EDUCATION/TRAINING PROGRAM

## 2024-06-17 PROCEDURE — 85347 COAGULATION TIME ACTIVATED: CPT

## 2024-06-17 PROCEDURE — 027036Z DILATION OF CORONARY ARTERY, ONE ARTERY WITH THREE DRUG-ELUTING INTRALUMINAL DEVICES, PERCUTANEOUS APPROACH: ICD-10-PCS | Performed by: INTERNAL MEDICINE

## 2024-06-17 PROCEDURE — 92978 ENDOLUMINL IVUS OCT C 1ST: CPT | Performed by: INTERNAL MEDICINE

## 2024-06-17 PROCEDURE — C1894 INTRO/SHEATH, NON-LASER: HCPCS | Performed by: INTERNAL MEDICINE

## 2024-06-17 PROCEDURE — C1874 STENT, COATED/COV W/DEL SYS: HCPCS | Performed by: INTERNAL MEDICINE

## 2024-06-17 PROCEDURE — 2580000003 HC RX 258: Performed by: INTERNAL MEDICINE

## 2024-06-17 PROCEDURE — 96374 THER/PROPH/DIAG INJ IV PUSH: CPT

## 2024-06-17 PROCEDURE — B2111ZZ FLUOROSCOPY OF MULTIPLE CORONARY ARTERIES USING LOW OSMOLAR CONTRAST: ICD-10-PCS | Performed by: INTERNAL MEDICINE

## 2024-06-17 PROCEDURE — 83880 ASSAY OF NATRIURETIC PEPTIDE: CPT

## 2024-06-17 PROCEDURE — 2709999900 HC NON-CHARGEABLE SUPPLY: Performed by: INTERNAL MEDICINE

## 2024-06-17 PROCEDURE — 89190 NASAL SMEAR FOR EOSINOPHILS: CPT

## 2024-06-17 PROCEDURE — 93010 ELECTROCARDIOGRAM REPORT: CPT | Performed by: INTERNAL MEDICINE

## 2024-06-17 PROCEDURE — 93005 ELECTROCARDIOGRAM TRACING: CPT | Performed by: STUDENT IN AN ORGANIZED HEALTH CARE EDUCATION/TRAINING PROGRAM

## 2024-06-17 PROCEDURE — 84300 ASSAY OF URINE SODIUM: CPT

## 2024-06-17 PROCEDURE — C1725 CATH, TRANSLUMIN NON-LASER: HCPCS | Performed by: INTERNAL MEDICINE

## 2024-06-17 PROCEDURE — 87641 MR-STAPH DNA AMP PROBE: CPT

## 2024-06-17 PROCEDURE — 82436 ASSAY OF URINE CHLORIDE: CPT

## 2024-06-17 PROCEDURE — 6370000000 HC RX 637 (ALT 250 FOR IP): Performed by: INTERNAL MEDICINE

## 2024-06-17 PROCEDURE — 99153 MOD SED SAME PHYS/QHP EA: CPT | Performed by: INTERNAL MEDICINE

## 2024-06-17 PROCEDURE — B2151ZZ FLUOROSCOPY OF LEFT HEART USING LOW OSMOLAR CONTRAST: ICD-10-PCS | Performed by: INTERNAL MEDICINE

## 2024-06-17 PROCEDURE — 2500000003 HC RX 250 WO HCPCS: Performed by: INTERNAL MEDICINE

## 2024-06-17 PROCEDURE — 82248 BILIRUBIN DIRECT: CPT

## 2024-06-17 PROCEDURE — 80053 COMPREHEN METABOLIC PANEL: CPT

## 2024-06-17 PROCEDURE — 6360000002 HC RX W HCPCS: Performed by: STUDENT IN AN ORGANIZED HEALTH CARE EDUCATION/TRAINING PROGRAM

## 2024-06-17 PROCEDURE — 93005 ELECTROCARDIOGRAM TRACING: CPT | Performed by: INTERNAL MEDICINE

## 2024-06-17 PROCEDURE — 93458 L HRT ARTERY/VENTRICLE ANGIO: CPT | Performed by: INTERNAL MEDICINE

## 2024-06-17 PROCEDURE — 76770 US EXAM ABDO BACK WALL COMP: CPT

## 2024-06-17 PROCEDURE — 84540 ASSAY OF URINE/UREA-N: CPT

## 2024-06-17 PROCEDURE — 71045 X-RAY EXAM CHEST 1 VIEW: CPT

## 2024-06-17 PROCEDURE — C1769 GUIDE WIRE: HCPCS | Performed by: INTERNAL MEDICINE

## 2024-06-17 PROCEDURE — 36415 COLL VENOUS BLD VENIPUNCTURE: CPT

## 2024-06-17 DEVICE — STENT ONYXNG40038UX ONYX 4.00X38RX
Type: IMPLANTABLE DEVICE | Status: FUNCTIONAL
Brand: ONYX FRONTIER™

## 2024-06-17 RX ORDER — PHENYLEPHRINE HCL IN 0.9% NACL 1 MG/10 ML
VIAL (ML) INTRAVENOUS PRN
Status: DISCONTINUED | OUTPATIENT
Start: 2024-06-17 | End: 2024-06-17 | Stop reason: HOSPADM

## 2024-06-17 RX ORDER — SODIUM CHLORIDE 0.9 % (FLUSH) 0.9 %
5-40 SYRINGE (ML) INJECTION PRN
Status: DISCONTINUED | OUTPATIENT
Start: 2024-06-17 | End: 2024-07-06 | Stop reason: HOSPADM

## 2024-06-17 RX ORDER — HEPARIN SODIUM 1000 [USP'U]/ML
60 INJECTION, SOLUTION INTRAVENOUS; SUBCUTANEOUS ONCE
Status: COMPLETED | OUTPATIENT
Start: 2024-06-17 | End: 2024-06-17

## 2024-06-17 RX ORDER — MIDAZOLAM HYDROCHLORIDE 1 MG/ML
INJECTION INTRAMUSCULAR; INTRAVENOUS PRN
Status: DISCONTINUED | OUTPATIENT
Start: 2024-06-17 | End: 2024-06-17 | Stop reason: HOSPADM

## 2024-06-17 RX ORDER — SODIUM CHLORIDE 9 MG/ML
INJECTION, SOLUTION INTRAVENOUS PRN
Status: DISCONTINUED | OUTPATIENT
Start: 2024-06-17 | End: 2024-07-06 | Stop reason: HOSPADM

## 2024-06-17 RX ORDER — SODIUM CHLORIDE 9 MG/ML
INJECTION, SOLUTION INTRAVENOUS CONTINUOUS
Status: DISCONTINUED | OUTPATIENT
Start: 2024-06-17 | End: 2024-06-18

## 2024-06-17 RX ORDER — FENTANYL CITRATE 50 UG/ML
INJECTION, SOLUTION INTRAMUSCULAR; INTRAVENOUS PRN
Status: DISCONTINUED | OUTPATIENT
Start: 2024-06-17 | End: 2024-06-17 | Stop reason: HOSPADM

## 2024-06-17 RX ORDER — HEPARIN SODIUM 10000 [USP'U]/100ML
5-30 INJECTION, SOLUTION INTRAVENOUS CONTINUOUS
Status: DISCONTINUED | OUTPATIENT
Start: 2024-06-17 | End: 2024-06-17

## 2024-06-17 RX ORDER — ASPIRIN 81 MG/1
324 TABLET, CHEWABLE ORAL ONCE
Status: COMPLETED | OUTPATIENT
Start: 2024-06-17 | End: 2024-06-17

## 2024-06-17 RX ORDER — METOPROLOL SUCCINATE 25 MG/1
25 TABLET, EXTENDED RELEASE ORAL DAILY
Status: DISCONTINUED | OUTPATIENT
Start: 2024-06-17 | End: 2024-06-24

## 2024-06-17 RX ORDER — VASOPRESSIN 20 U/ML
INJECTION PARENTERAL CONTINUOUS PRN
Status: DISCONTINUED | OUTPATIENT
Start: 2024-06-17 | End: 2024-06-17 | Stop reason: HOSPADM

## 2024-06-17 RX ORDER — ACETAMINOPHEN 325 MG/1
650 TABLET ORAL EVERY 4 HOURS PRN
Status: DISCONTINUED | OUTPATIENT
Start: 2024-06-17 | End: 2024-07-06 | Stop reason: HOSPADM

## 2024-06-17 RX ORDER — HEPARIN SODIUM 1000 [USP'U]/ML
30 INJECTION, SOLUTION INTRAVENOUS; SUBCUTANEOUS PRN
Status: DISCONTINUED | OUTPATIENT
Start: 2024-06-17 | End: 2024-06-17

## 2024-06-17 RX ORDER — SODIUM CHLORIDE 0.9 % (FLUSH) 0.9 %
5-40 SYRINGE (ML) INJECTION EVERY 12 HOURS SCHEDULED
Status: DISCONTINUED | OUTPATIENT
Start: 2024-06-17 | End: 2024-07-06 | Stop reason: HOSPADM

## 2024-06-17 RX ORDER — ATORVASTATIN CALCIUM 40 MG/1
40 TABLET, FILM COATED ORAL NIGHTLY
Status: DISCONTINUED | OUTPATIENT
Start: 2024-06-17 | End: 2024-07-06 | Stop reason: HOSPADM

## 2024-06-17 RX ORDER — HEPARIN SODIUM 1000 [USP'U]/ML
60 INJECTION, SOLUTION INTRAVENOUS; SUBCUTANEOUS PRN
Status: DISCONTINUED | OUTPATIENT
Start: 2024-06-17 | End: 2024-06-17

## 2024-06-17 RX ORDER — HEPARIN SODIUM 1000 [USP'U]/ML
INJECTION, SOLUTION INTRAVENOUS; SUBCUTANEOUS PRN
Status: DISCONTINUED | OUTPATIENT
Start: 2024-06-17 | End: 2024-06-17 | Stop reason: HOSPADM

## 2024-06-17 RX ORDER — ASPIRIN 81 MG/1
81 TABLET, CHEWABLE ORAL DAILY
Status: DISCONTINUED | OUTPATIENT
Start: 2024-06-18 | End: 2024-07-06 | Stop reason: HOSPADM

## 2024-06-17 RX ADMIN — HEPARIN SODIUM 12 UNITS/KG/HR: 10000 INJECTION, SOLUTION INTRAVENOUS at 05:13

## 2024-06-17 RX ADMIN — SODIUM CHLORIDE: 9 INJECTION, SOLUTION INTRAVENOUS at 21:51

## 2024-06-17 RX ADMIN — ATORVASTATIN CALCIUM 40 MG: 40 TABLET, FILM COATED ORAL at 20:22

## 2024-06-17 RX ADMIN — METOPROLOL SUCCINATE 25 MG: 25 TABLET, EXTENDED RELEASE ORAL at 14:50

## 2024-06-17 RX ADMIN — TICAGRELOR 90 MG: 90 TABLET ORAL at 20:22

## 2024-06-17 RX ADMIN — ASPIRIN 81 MG 324 MG: 81 TABLET ORAL at 05:06

## 2024-06-17 RX ADMIN — HEPARIN SODIUM 2700 UNITS: 1000 INJECTION INTRAVENOUS; SUBCUTANEOUS at 05:09

## 2024-06-17 RX ADMIN — SODIUM CHLORIDE: 9 INJECTION, SOLUTION INTRAVENOUS at 08:55

## 2024-06-17 ASSESSMENT — PAIN - FUNCTIONAL ASSESSMENT: PAIN_FUNCTIONAL_ASSESSMENT: NONE - DENIES PAIN

## 2024-06-17 NOTE — PLAN OF CARE
Problem: Discharge Planning  Goal: Discharge to home or other facility with appropriate resources  Outcome: Progressing  Flowsheets (Taken 6/17/2024 1318)  Discharge to home or other facility with appropriate resources:   Identify barriers to discharge with patient and caregiver   Arrange for needed discharge resources and transportation as appropriate   Identify discharge learning needs (meds, wound care, etc)   Arrange for interpreters to assist at discharge as needed   Refer to discharge planning if patient needs post-hospital services based on physician order or complex needs related to functional status, cognitive ability or social support system     Problem: ABCDS Injury Assessment  Goal: Absence of physical injury  Outcome: Progressing  Flowsheets (Taken 6/17/2024 1318)  Absence of Physical Injury: Implement safety measures based on patient assessment     Problem: Safety - Adult  Goal: Free from fall injury  Outcome: Progressing  Flowsheets (Taken 6/17/2024 1318)  Free From Fall Injury: Instruct family/caregiver on patient safety  Note: Call light in reach, bed in lowest position, and bed alarm activated.  Education given on use of call light before ambulation and when in need of assistance.  Patient expressed understanding.  Hourly visual checks performed and charted.  Toileting offered to patient.  No falls this shift, at any time.  Arm band and falling star in place.  Will continue to monitor.      Problem: Cardiovascular - Adult  Goal: Maintains optimal cardiac output and hemodynamic stability  Outcome: Progressing  Flowsheets (Taken 6/17/2024 1318)  Maintains optimal cardiac output and hemodynamic stability:   Monitor blood pressure and heart rate   Monitor urine output and notify Licensed Independent Practitioner for values outside of normal range   Assess for signs of decreased cardiac output  Goal: Absence of cardiac dysrhythmias or at baseline  Outcome: Progressing  Flowsheets (Taken 6/17/2024  1318)  Absence of cardiac dysrhythmias or at baseline:   Monitor cardiac rate and rhythm   Assess for signs of decreased cardiac output     Problem: Skin/Tissue Integrity - Adult  Goal: Skin integrity remains intact  Outcome: Progressing  Flowsheets (Taken 6/17/2024 1318)  Skin Integrity Remains Intact: Monitor for areas of redness and/or skin breakdown     Problem: Pain  Goal: Verbalizes/displays adequate comfort level or baseline comfort level  Outcome: Progressing  Flowsheets (Taken 6/17/2024 1318)  Verbalizes/displays adequate comfort level or baseline comfort level:   Encourage patient to monitor pain and request assistance   Assess pain using appropriate pain scale   Administer analgesics based on type and severity of pain and evaluate response   Implement non-pharmacological measures as appropriate and evaluate response   Consider cultural and social influences on pain and pain management   Notify Licensed Independent Practitioner if interventions unsuccessful or patient reports new pain   Care plan reviewed with patient. Patient verbalizes understanding of the plan of care and contributed to goal setting.

## 2024-06-17 NOTE — PROCEDURES
PROCEDURE NOTE  Date: 6/17/2024   Name: Erin Hanna  YOB: 1945    Procedures  12 lead EKG completed. Results handed to Deisy KLEIN. Cely ALSTON

## 2024-06-17 NOTE — PROGRESS NOTES
Heart attack teaching covered with patient and/or family or significant other:  Signs and symptoms of a heart attack.  When to call 911 and the importance of calling 911.  Personal risk factors and ways to lower their risk.  4.   Importance of quitting smoking if applicable.     Heart attack booklet given to the patient and/or family or significant other. Reviewed:  How to take Nitroglycerin.  The importance of participating in Cardiac Rehab and hours of operation.   Heart Healthy Diet.  Risk factor modification.(Overweight, Obesity, Diabetes, Hypertension, Smoking, High Cholesterol, Stress)  Discharge instructions for Cath/Intervention procedure site if applicable.

## 2024-06-17 NOTE — PROGRESS NOTES
Inpatient Cardiac Rehabilitation Consult    Received consult for Phase II Cardiac Rehabilitation.  Patient needs cardiac rehab due to STEMI / PCI on 6/17/24.  Importance of Cardiac Rehab discussed with patient.  Reviewed cardiac rehab class times.  Patient questions answered.  We will contact patient at home to schedule evaluation appointment. Pt interested. Cardiac Rehab brochure given.

## 2024-06-17 NOTE — CARE COORDINATION
Case Management Assessment Initial Evaluation    Date/Time of Evaluation: 6/17/2024 7:39 AM  Assessment Completed by: Manisha Sarah RN    If patient is discharged prior to next notation, then this note serves as note for discharge by case management.    Patient Name: Erin Hanna                   YOB: 1945  Diagnosis: STEMI (ST elevation myocardial infarction) (HCC) [I21.3]  ST elevation myocardial infarction (STEMI), unspecified artery (HCC) [I21.3]                   Date / Time: 6/17/2024  4:37 AM  Location: [address] [place] [address]     Patient Admission Status: Inpatient   Readmission Risk Low 0-14, Mod 15-19), High > 20: No data recorded  Current PCP: Claudine Gutierrez APRN - [facility]    Additional Case Management Notes: To ED w/ progressive dyspnea and SOB. Pt awoke to sudden onset SOB. ECG showed inferolateral ST-elevations but there is also concern for anterior elevations. High Troponin 136. To cath lab for stat LHC w/ PCI.     Interventional Cardiology following. Telemetry- Afib. Afebrile. Room air. Heparin gtt stopped. IVF. Asa. Brilinta.     Vitals:    06/17/24 0857 06/17/24 0900 06/17/24 0902 06/17/24 0930   BP:  (!) 206/101 (!) 160/124 (!) 178/86   Pulse:  (!) 110 (!) 123 (!) 123   Resp:  20 24 (!) 33   Temp:       TempSrc:       SpO2:  96% 95% 96%   Weight: 45.8 kg (100 lb 15.5 oz)      Height: 1.6 m (5' 3\")          Procedures:   6/17 PCI w/ Dr. Fuller    Imaging: none     Patient Goals/Plan/Treatment Preferences: Met w/ [facility]. Verified insurance and PCP. She is independent and actively drives. Doesn't use any DME or HH/OP services, has cane if needed. Plans to return to duplex alone; her children live in attached duplex downstairs. Denies needs.     06/17/24 0940   Service Assessment   Patient Orientation Alert and Oriented   Cognition Alert   History Provided By Patient   Primary Caregiver Self   Accompanied By/Relationship daughter Kari   [place] Children;Family Members   Patient's

## 2024-06-17 NOTE — CONSULTS
CRITICAL CARE PROGRESS NOTE      Patient:  Erin Hanna    Unit/Bed:4D-11/011-A  YOB: 1945  MRN: [id number]   PCP: Claudine Gutierrez APRN - CNP  Date of Admission: 6/17/2024  Chief Complaint:-Shortness of breath    Assessment and Plan:    STEMI: Presented to ED with shortness of breath.  EKG showing inferolateral ST elevations.  Troponin 136. Interventional cardiology consulted.  S/p PCI of RCA w/ SANTA overlapping x 3.  Cardiology primary team.  DAPT with aspirin and Brilinta.  Lipid panel A1c ordered.  Cath also showed 50% Ost to LAD stenosis, 40% left circumflex stenossi, 40% RPDA stenosis, and 95% stenosis of RPAV.  Will start BB and atorvastatin, follow lipid panel results.   ANNA on CKD IIIa: Prerenal in setting of poor oral intake.  Expecting creatinine may worsen in the setting of PCI.  Will send urine studies and renal US for further evaluation. Continue mild fluids at this time.  Daily BMP.  Avoid nephrotoxic agents, hold benazepril.  Strict I's and O's.  Hypertension: On benazepril and amlodipine outpatient.  Currently on hold.  Blood pressure stable with SBP in 130s.  Hypoalbuminemia: 3.2.  Likely in setting of poor general nutrition given age and body habitus.  BMI 17.89.    INITIAL H AND P AND ICU COURSE:  89-year-old female with history of hypertension and CKD 3A presenting with worsening shortness of breath.  This been going on for the past few days.  Woke up very early this morning with severe shortness of breath.  Denied any chest pain/arm pain/jaw pain at that time.  Denied palpitations.  Denied nausea or vomiting.  Was brought to the ED, EKG showed inferolateral ST elevations, elevated troponin at 136.  No prior history of stents.  Interventional cardiology consulted, patient taken for emergent cath, PCI w/ SANTA x 3 to RCA.  Improved repeat EKG.  Patient sent to ICU for close monitoring post cath.  Started on DAPT of aspirin and Brilinta per cardiology.    Past Medical History:

## 2024-06-17 NOTE — H&P
[facility]  Sedation/Analgesia History & Physical    Pt Name: Erin Hanna  Account number: [id number]  MRN: [id number]  YOB: 1945  Provider Performing Procedure: Brock Fuller MD MD  Referring Provider: No att. providers found   Primary Care Physician: Claudine Gutierrez APRN - CNP  Date: 6/17/2024    PRE-PROCEDURE    Code Status: FULL CODE  Brief History/Pre-Procedure Diagnosis:   STEMI    Consent: : I have discussed with the patient risks, benefits, and alternatives (and relevant risks, benefits, and side effects related to alternatives or not receiving care), and likelihood of the success.   The patient and/or representative appear to understand and agree to proceed.  The discussion encompasses risks, benefits, and side effects related to the alternatives and the risks related to not receiving the proposed care, treatment, and services.     The indication, risks and benefits of the procedure and possible therapeutic consequences and alternatives were discussed with the patient. The patient was given the opportunity to ask questions and to have them answered to his/her satisfaction. Risks of the procedure include but are not limited to the following: Bleeding, hematoma including retroperitoneal hemmorhage, infection, pain and discomfort, injury to the aorta and other blood vessels, rhythm disturbance, low blood pressure, myocardial infarction, stroke, kidney damage/failure, myocardial perforation, allergic reactions to sedatives/contrast material, loss of pulse/vascular compromise requiring surgery, aneurysm/pseudoaneurysm formation, possible loss of a limb/hand/leg, needing blood transfusion, requiring emergent open heart surgery or emergent coronary intervention, the need for intubation/respiratory support, the requirement for defibrillation/cardioversion, and death. Alternatives to and omission of the suggested procedure were discussed. The patient had no further questions  and wished to proceed; the consent form was signed.        MEDICAL HISTORY   has a past medical history of Hypertension.    SURGICAL HISTORY   has a past surgical history that includes Cholecystectomy; Colonoscopy; and Tubal ligation.  Additional information:       ALLERGIES   Allergies as of 06/17/2024    (No Known Allergies)     Additional information:       MEDICATIONS     Current Facility-Administered Medications:     heparin (porcine) injection 2,700 Units, 60 Units/kg, IntraVENous, PRN, Thad Domínguez MD    heparin (porcine) injection 1,400 Units, 30 Units/kg, IntraVENous, PRN, Thad Domínguez MD    heparin 25,000 units in dextrose 5% 250 mL (premix) infusion, 5-30 Units/kg/hr, IntraVENous, Continuous, Thad Domínguez MD, Last Rate: 5.5 mL/hr at 06/17/24 0513, 12 Units/kg/hr at 06/17/24 0513  Prior to Admission medications    Medication Sig Start Date End Date Taking? Authorizing Provider   Cholecalciferol (VITAMIN D) 50 MCG (2000 UT) CAPS capsule Take 2,000 Units by mouth daily    Kassidy Kerns MD   hydrOXYzine (VISTARIL) 25 MG capsule TAKE 1 CAPSULE BY MOUTH EVERY 8 HOURS AS NEEDED FOR ANXIETY AND FOR SLEEP  Patient not taking: Reported on 10/9/2023 7/15/21   Kassidy Kerns MD   nicotine polacrilex (COMMIT) 4 MG lozenge Take by mouth 7/15/21   Kassidy Kerns MD   albuterol sulfate HFA (VENTOLIN HFA) 108 (90 BASE) MCG/ACT inhaler Inhale 1-2 puffs into the lungs every 6 hours as needed for Wheezing 11/14/16   Monique Raymundo APRN - CNP   amLODIPine (NORVASC) 5 MG tablet Take 1 tablet by mouth daily    Kassidy Kerns MD   benazepril (LOTENSIN) 20 MG tablet Take 1 tablet by mouth daily    Kassidy Kerns MD   aspirin 81 MG chewable tablet Take 1 tablet by mouth daily    Kassidy Kerns MD     Additional information:       VITAL SIGNS   Vitals:    06/17/24 0526   BP: 112/61   Pulse: (!) 104   Resp: (!) 32   Temp:    SpO2: 91%       PHYSICAL:   General: No acute

## 2024-06-17 NOTE — BRIEF OP NOTE
[facility]  Sedation/Analgesia Post Sedation Record    Pt Name: Erin Hanna  Account number: [id number]  MRN: [id number]  YOB: 1945  Procedure Performed By: Brock Fuller MD MD FACC, JUANITO, ROSSI  Primary Care Physician: Claudine Gutierrez APRN - CNP  Date: 6/17/2024    POST-PROCEDURE    Physicians/Assistants: Brock Fuller MD, PARVIN, JUANITO, ROSSI    Procedure Performed: PCI    Sedation/Anesthesia: Versed/ Fentanyl and 2% xylocaine local anesthesia.      Estimated Blood Loss: < 50 ml.     Specimens Removed: None         Disposition of Specimen: N/A        Complications: No Immediate Complications.       Post-procedure Diagnosis/Findings:       PCI   DAPT  GDMT for CAD           Brock Fuller MD, PARVIN, JUANITO, ROSSI  Interventional Cardiology

## 2024-06-17 NOTE — ED PROVIDER NOTES
[facility] CARDIAC CATH LAB     Pt Name: Erin Hanna  MRN: [id number]  Birthdate 1945  Date of evaluation: 6/17/2024  Resident Physician: Thad Domínguez MD  Attending Physician: Martir Mcdermott DO      CHIEF COMPLAINT       Chief Complaint   Patient presents with    Shortness of Breath     HISTORY OF PRESENT ILLNESS   Erin Hanna is a 78 y.o. female with PMHx of hypertension  who presents to the emergency department for evaluation of progressive dyspnea and shortness of breath.  Daughter reports that she is been having worsening shortness of breath over the last couple weeks.  They have noticed that she is also been having worsening bilateral lower extremity edema.  She denies any chest pain, nausea, vomiting or diaphoresis but states that around 4 AM today she had sudden onset worsening of her shortness of breath which prompted her to call her daughter and come to the emergency department for further evaluation.    The patient has no other acute complaints at this time.  PASTMEDICAL HISTORY     Past Medical History:   Diagnosis Date    Hypertension        Patient Active Problem List   Diagnosis Code    Anxiety disorder F41.9    Chronic obstructive pulmonary disease (HCC) J44.9    Essential hypertension I10    STEMI (ST elevation myocardial infarction) ([facility]) I21.3     SURGICAL HISTORY       Past Surgical History:   Procedure Laterality Date    CHOLECYSTECTOMY      COLONOSCOPY      TUBAL LIGATION         CURRENT MEDICATIONS       Current Discharge Medication List        CONTINUE these medications which have NOT CHANGED    Details   Cholecalciferol (VITAMIN D) 50 MCG (2000 UT) CAPS capsule Take 2,000 Units by mouth daily      hydrOXYzine (VISTARIL) 25 MG capsule TAKE 1 CAPSULE BY MOUTH EVERY 8 HOURS AS NEEDED FOR ANXIETY AND FOR SLEEP      nicotine polacrilex (COMMIT) 4 MG lozenge Take by mouth      albuterol sulfate HFA (VENTOLIN HFA) 108 (90 BASE) MCG/ACT inhaler Inhale 1-2 puffs into  following components:    Activated Clotting Time 287 (*)     All other components within normal limits   APTT   PROTIME-INR   ANION GAP   OSMOLALITY   ANTI-XA, UNFRACTIONATED HEPARIN   ANTI-XA, UNFRACTIONATED HEPARIN   ANTI-XA, UNFRACTIONATED HEPARIN       (Any cultures that may have been sent were not resulted at the time of this patient visit)  (A negative COVID-19 test should be interpreted as COVID no longer suspected unless otherwise noted in this encounter documentation note)  MEDICAL DECISION MAKING / ED COURSE:     ED Course as of 06/17/24 0625   Mon Jun 17, 2024   0455 Spoke with Dr. Fuller went over the EKG and presentation together.  I also did perform POCUS to evaluate for effusion in the ventricular function.  Will go to Cath Lab.  Asked that I start aspirin and heparin [TM]   0504 Bedside POCUS performed.  No pericardial effusion.  Hypodynamic heart. Video sent to dr. Fuller [TM]      ED Course User Index  [TM] Thad Domínguez MD     Vitals reviewed:  ED Triage Vitals   BP Temp Temp Source Pulse Respirations SpO2 Height Weight - Scale   06/17/24 0453 06/17/24 0453 06/17/24 0453 06/17/24 0453 06/17/24 0453 06/17/24 0453 -- 06/17/24 0456   116/86 98.1 °F (36.7 °C) Oral 96 18 96 %  45.8 kg (101 lb)       Summary of Patient Presentation (see ED course if left blank):      This is a 78-year-old female who presented with acute on chronic dyspnea.  Her EKG showed diffuse ST segment elevations.  Upon seeing EKG a STEMI was called.  Her EKG revealed atrial fibrillation with ST segment elevation meeting criteria in V2, V4, V5 as well as the inferior leads with reciprocal changes in aVR.  The case discussed with Dr. Fuller who took the patient to Cath Lab.  She was given aspirin load with heparin in the emergency department.  Patient was sent to Cath Lab in hemodynamically stable condition.      ED Medications administered this visit:  (None if blank)  Medications   heparin (porcine) injection 2,700 Units (has no

## 2024-06-17 NOTE — H&P
[facility]  Consult    Patient's Name/Date of Birth: Erin Hanna / 1945 (78 y.o.)    Date: June 17, 2024     Referring Provider: No att. providers found    CHIEF COMPLAINT: FERRARI      HPI: This is a pleasant 78 y.o. female with hx of HTN who presents with progressive dyspnea and sob.  Has been having severe sob for the last couple days.  Tonight, she awoke with sudden onset of sob.  No chest pain, arm pain, jaw pain, or palpitations.  Daughter brought her to the ED.  ECG show inferolateral ST-elevations but there is also concern for anterior elevations.  She has no f/c/ns.  No sick contacts.  No prior autoimmune diseases.  ED did POCUS, no obvious fluid, ED preserved.  Cr 1.2.  Hgb 13.  She is not having significant discomfort now, however ST elevation persists.       All labs, EKG's, diagnostic testing and images as well as cardiac cath, stress testing were reviewed during this encounter    Past Medical History:   Diagnosis Date    Hypertension      Past Surgical History:   Procedure Laterality Date    CHOLECYSTECTOMY      COLONOSCOPY      TUBAL LIGATION       Current Facility-Administered Medications   Medication Dose Route Frequency Provider Last Rate Last Admin    heparin (porcine) injection 2,700 Units  60 Units/kg IntraVENous PRN Thad Domínguez MD        heparin (porcine) injection 1,400 Units  30 Units/kg IntraVENous PRN Thad Domínguez MD        heparin 25,000 units in dextrose 5% 250 mL (premix) infusion  5-30 Units/kg/hr IntraVENous Continuous Thad Domínguez MD 5.5 mL/hr at 06/17/24 0513 12 Units/kg/hr at 06/17/24 0513     Prior to Admission medications    Medication Sig Start Date End Date Taking? Authorizing Provider   Cholecalciferol (VITAMIN D) 50 MCG (2000 UT) CAPS capsule Take 2,000 Units by mouth daily    ProviderKasisdy MD   hydrOXYzine (VISTARIL) 25 MG capsule TAKE 1 CAPSULE BY MOUTH EVERY 8 HOURS AS NEEDED FOR ANXIETY AND FOR SLEEP  Patient not taking:

## 2024-06-18 ENCOUNTER — APPOINTMENT (OUTPATIENT)
Dept: GENERAL RADIOLOGY | Age: 79
End: 2024-06-18
Payer: MEDICARE

## 2024-06-18 ENCOUNTER — APPOINTMENT (OUTPATIENT)
Age: 79
End: 2024-06-18
Payer: MEDICARE

## 2024-06-18 LAB
ACINETOBACTER CALCOACETICUS-BAUMANNII BY PCR: NOT DETECTED
ANION GAP SERPL CALC-SCNC: 9 MEQ/L (ref 8–16)
BLACTX-M ISLT/SPM QL: ABNORMAL
BLAIMP ISLT/SPM QL: ABNORMAL
BLAKPC ISLT/SPM QL: ABNORMAL
BLAOXA-48-LIKE ISLT/SPM QL: ABNORMAL
BLAVIM ISLT/SPM QL: ABNORMAL
BUN SERPL-MCNC: 15 MG/DL (ref 7–22)
C PNEUM DNA LOWER RESP QL NAA+NON-PROBE: NOT DETECTED
CALCIUM SERPL-MCNC: 9.1 MG/DL (ref 8.5–10.5)
CHLORIDE SERPL-SCNC: 102 MEQ/L (ref 98–111)
CHOLEST SERPL-MCNC: 139 MG/DL (ref 100–199)
CO2 SERPL-SCNC: 23 MEQ/L (ref 23–33)
CREAT SERPL-MCNC: 1 MG/DL (ref 0.4–1.2)
DEPRECATED MEAN GLUCOSE BLD GHB EST-ACNC: 129 MG/DL (ref 70–126)
DEPRECATED RDW RBC AUTO: 46.8 FL (ref 35–45)
ECHO AR MAX VEL PISA: 3.8 M/S
ECHO AV CUSP MM: 1.5 CM
ECHO AV MEAN GRADIENT: 6 MMHG
ECHO AV MEAN VELOCITY: 1.1 M/S
ECHO AV PEAK GRADIENT: 13 MMHG
ECHO AV PEAK VELOCITY: 1.8 M/S
ECHO AV REGURGITANT PHT: 426 MS
ECHO AV VELOCITY RATIO: 0.44
ECHO AV VTI: 31 CM
ECHO BSA: 1.43 M2
ECHO EST RA PRESSURE: 15 MMHG
ECHO LA AREA 2C: 19.7 CM2
ECHO LA AREA 4C: 23.9 CM2
ECHO LA DIAMETER INDEX: 2.36 CM/M2
ECHO LA DIAMETER: 3.4 CM
ECHO LA MAJOR AXIS: 6.4 CM
ECHO LA MINOR AXIS: 5 CM
ECHO LA VOL MOD A2C: 61 ML (ref 22–52)
ECHO LA VOL MOD A4C: 69 ML (ref 22–52)
ECHO LA VOLUME INDEX MOD A2C: 42 ML/M2 (ref 16–34)
ECHO LA VOLUME INDEX MOD A4C: 48 ML/M2 (ref 16–34)
ECHO LV E' LATERAL VELOCITY: 6 CM/S
ECHO LV E' SEPTAL VELOCITY: 3 CM/S
ECHO LV EDV A2C: 60 ML
ECHO LV EDV A4C: 59 ML
ECHO LV EDV INDEX A4C: 41 ML/M2
ECHO LV EDV NDEX A2C: 42 ML/M2
ECHO LV EJECTION FRACTION A2C: 49 %
ECHO LV EJECTION FRACTION A4C: 53 %
ECHO LV EJECTION FRACTION BIPLANE: 53 % (ref 55–100)
ECHO LV ESV A2C: 31 ML
ECHO LV ESV A4C: 28 ML
ECHO LV ESV INDEX A2C: 22 ML/M2
ECHO LV ESV INDEX A4C: 19 ML/M2
ECHO LV FRACTIONAL SHORTENING: 20 % (ref 28–44)
ECHO LV INTERNAL DIMENSION DIASTOLE INDEX: 2.78 CM/M2
ECHO LV INTERNAL DIMENSION DIASTOLIC: 4 CM (ref 3.9–5.3)
ECHO LV INTERNAL DIMENSION SYSTOLIC INDEX: 2.22 CM/M2
ECHO LV INTERNAL DIMENSION SYSTOLIC: 3.2 CM
ECHO LV ISOVOLUMETRIC RELAXATION TIME (IVRT): 127 MS
ECHO LV IVSD: 1 CM (ref 0.6–0.9)
ECHO LV MASS 2D: 127.1 G (ref 67–162)
ECHO LV MASS INDEX 2D: 88.2 G/M2 (ref 43–95)
ECHO LV POSTERIOR WALL DIASTOLIC: 1 CM (ref 0.6–0.9)
ECHO LV RELATIVE WALL THICKNESS RATIO: 0.5
ECHO LVOT AV VTI INDEX: 0.45
ECHO LVOT MEAN GRADIENT: 1 MMHG
ECHO LVOT PEAK GRADIENT: 2 MMHG
ECHO LVOT PEAK VELOCITY: 0.8 M/S
ECHO LVOT VTI: 13.8 CM
ECHO MV A VELOCITY: 0.96 M/S
ECHO MV E DECELERATION TIME (DT): 178 MS
ECHO MV E VELOCITY: 0.6 M/S
ECHO MV E/A RATIO: 0.63
ECHO MV E/E' LATERAL: 10
ECHO MV E/E' RATIO (AVERAGED): 15
ECHO MV E/E' SEPTAL: 20
ECHO MV REGURGITANT PEAK GRADIENT: 108 MMHG
ECHO MV REGURGITANT PEAK VELOCITY: 5.2 M/S
ECHO PULMONARY ARTERY END DIASTOLIC PRESSURE: 5 MMHG
ECHO PV MAX VELOCITY: 0.7 M/S
ECHO PV PEAK GRADIENT: 2 MMHG
ECHO PV REGURGITANT MAX VELOCITY: 1.1 M/S
ECHO RA AREA 4C: 17.4 CM2
ECHO RIGHT VENTRICULAR SYSTOLIC PRESSURE (RVSP): 61 MMHG
ECHO RV BASAL DIMENSION: 4.4 CM
ECHO RV INTERNAL DIMENSION: 2.9 CM
ECHO RV TAPSE: 1.3 CM (ref 1.7–?)
ECHO TV E WAVE: 0.5 M/S
ECHO TV REGURGITANT MAX VELOCITY: 3.38 M/S
ECHO TV REGURGITANT PEAK GRADIENT: 46 MMHG
EKG ATRIAL RATE: 101 BPM
EKG ATRIAL RATE: 78 BPM
EKG ATRIAL RATE: 83 BPM
EKG P AXIS: 19 DEGREES
EKG P AXIS: 83 DEGREES
EKG P AXIS: 88 DEGREES
EKG P-R INTERVAL: 120 MS
EKG P-R INTERVAL: 124 MS
EKG P-R INTERVAL: 126 MS
EKG Q-T INTERVAL: 344 MS
EKG Q-T INTERVAL: 372 MS
EKG Q-T INTERVAL: 386 MS
EKG QRS DURATION: 86 MS
EKG QRS DURATION: 88 MS
EKG QRS DURATION: 88 MS
EKG QTC CALCULATION (BAZETT): 440 MS
EKG QTC CALCULATION (BAZETT): 446 MS
EKG QTC CALCULATION (BAZETT): 462 MS
EKG R AXIS: -12 DEGREES
EKG R AXIS: -16 DEGREES
EKG R AXIS: -7 DEGREES
EKG T AXIS: 66 DEGREES
EKG T AXIS: 68 DEGREES
EKG T AXIS: 73 DEGREES
EKG VENTRICULAR RATE: 101 BPM
EKG VENTRICULAR RATE: 78 BPM
EKG VENTRICULAR RATE: 93 BPM
ENTEROBACTER CLOACAE COMPLEX BY PCR: NOT DETECTED
ERYTHROCYTE [DISTWIDTH] IN BLOOD BY AUTOMATED COUNT: 13.8 % (ref 11.5–14.5)
ESCHERICHIA COLI BY PCR: NOT DETECTED
FLUAV RNA LOWER RESP QL NAA+NON-PROBE: NOT DETECTED
FLUBV RNA LOWER RESP QL NAA+NON-PROBE: NOT DETECTED
GFR SERPL CREATININE-BSD FRML MDRD: 58 ML/MIN/1.73M2
GLUCOSE SERPL-MCNC: 107 MG/DL (ref 70–108)
HADV DNA LOWER RESP QL NAA+NON-PROBE: NOT DETECTED
HAEMOPHILUS INFLUENZAE BY PCR: NOT DETECTED
HBA1C MFR BLD HPLC: 6.3 % (ref 4.4–6.4)
HCOV RNA LOWER RESP QL NAA+NON-PROBE: NOT DETECTED
HCT VFR BLD AUTO: 39.3 % (ref 37–47)
HDLC SERPL-MCNC: 64 MG/DL
HGB BLD-MCNC: 12.5 GM/DL (ref 12–16)
HMPV RNA LOWER RESP QL NAA+NON-PROBE: NOT DETECTED
HPIV RNA LOWER RESP QL NAA+NON-PROBE: NOT DETECTED
KLEBSIELLA AEROGENES BY PCR: NOT DETECTED
KLEBSIELLA OXYTOCA BY PCR: NOT DETECTED
KLEBSIELLA PNEUMONIAE GROUP BY PCR: NOT DETECTED
L PNEUMO DNA LOWER RESP QL NAA+NON-PROBE: NOT DETECTED
LDLC SERPL CALC-MCNC: 60 MG/DL
M PNEUMO DNA LOWER RESP QL NAA+NON-PROBE: NOT DETECTED
MCH RBC QN AUTO: 29.6 PG (ref 26–33)
MCHC RBC AUTO-ENTMCNC: 31.8 GM/DL (ref 32.2–35.5)
MCV RBC AUTO: 92.9 FL (ref 81–99)
MORAXELLA CATARRHALIS BY PCR: NOT DETECTED
PLATELET # BLD AUTO: 134 THOU/MM3 (ref 130–400)
PMV BLD AUTO: 11.8 FL (ref 9.4–12.4)
POTASSIUM SERPL-SCNC: 4.2 MEQ/L (ref 3.5–5.2)
PROTEUS SPECIES BY PCR: NOT DETECTED
PSEUDOMONAS AERUGINOSA BY PCR: NOT DETECTED
RBC # BLD AUTO: 4.23 MILL/MM3 (ref 4.2–5.4)
RESISTANT GENE MECA/C & MREJ BY PCR: NOT DETECTED
RESISTANT GENE NDM BY PCR: ABNORMAL
RSV RNA LOWER RESP QL NAA+NON-PROBE: NOT DETECTED
RV+EV RNA LOWER RESP QL NAA+NON-PROBE: NOT DETECTED
SERRATIA MARCESCENS BY PCR: NOT DETECTED
SODIUM SERPL-SCNC: 134 MEQ/L (ref 135–145)
SOURCE: ABNORMAL
SPECIMEN ACCEPTABILITY: ABNORMAL
STAPH AUREUS BY PCR: DETECTED
STREP AGALACTIAE BY PCR: NOT DETECTED
STREP PNEUMONIAE BY PCR: NOT DETECTED
STREP PYOGENES BY PCR: NOT DETECTED
TRIGL SERPL-MCNC: 74 MG/DL (ref 0–199)
WBC # BLD AUTO: 5 THOU/MM3 (ref 4.8–10.8)

## 2024-06-18 PROCEDURE — 93306 TTE W/DOPPLER COMPLETE: CPT | Performed by: INTERNAL MEDICINE

## 2024-06-18 PROCEDURE — 93005 ELECTROCARDIOGRAM TRACING: CPT

## 2024-06-18 PROCEDURE — 94640 AIRWAY INHALATION TREATMENT: CPT

## 2024-06-18 PROCEDURE — 87205 SMEAR GRAM STAIN: CPT

## 2024-06-18 PROCEDURE — 36415 COLL VENOUS BLD VENIPUNCTURE: CPT

## 2024-06-18 PROCEDURE — 87486 CHLMYD PNEUM DNA AMP PROBE: CPT

## 2024-06-18 PROCEDURE — 85027 COMPLETE CBC AUTOMATED: CPT

## 2024-06-18 PROCEDURE — 71045 X-RAY EXAM CHEST 1 VIEW: CPT

## 2024-06-18 PROCEDURE — 6370000000 HC RX 637 (ALT 250 FOR IP): Performed by: INTERNAL MEDICINE

## 2024-06-18 PROCEDURE — 87631 RESP VIRUS 3-5 TARGETS: CPT

## 2024-06-18 PROCEDURE — 87581 M.PNEUMON DNA AMP PROBE: CPT

## 2024-06-18 PROCEDURE — 2100000000 HC CCU R&B

## 2024-06-18 PROCEDURE — 2580000003 HC RX 258: Performed by: INTERNAL MEDICINE

## 2024-06-18 PROCEDURE — 99232 SBSQ HOSP IP/OBS MODERATE 35: CPT | Performed by: INTERNAL MEDICINE

## 2024-06-18 PROCEDURE — 87798 DETECT AGENT NOS DNA AMP: CPT

## 2024-06-18 PROCEDURE — 80061 LIPID PANEL: CPT

## 2024-06-18 PROCEDURE — 87150 DNA/RNA AMPLIFIED PROBE: CPT

## 2024-06-18 PROCEDURE — 6360000002 HC RX W HCPCS

## 2024-06-18 PROCEDURE — 6370000000 HC RX 637 (ALT 250 FOR IP)

## 2024-06-18 PROCEDURE — 6370000000 HC RX 637 (ALT 250 FOR IP): Performed by: PHYSICIAN ASSISTANT

## 2024-06-18 PROCEDURE — 87070 CULTURE OTHR SPECIMN AEROBIC: CPT

## 2024-06-18 PROCEDURE — 93010 ELECTROCARDIOGRAM REPORT: CPT | Performed by: INTERNAL MEDICINE

## 2024-06-18 PROCEDURE — 83036 HEMOGLOBIN GLYCOSYLATED A1C: CPT

## 2024-06-18 PROCEDURE — 87541 LEGION PNEUMO DNA AMP PROB: CPT

## 2024-06-18 PROCEDURE — 93306 TTE W/DOPPLER COMPLETE: CPT

## 2024-06-18 PROCEDURE — 94761 N-INVAS EAR/PLS OXIMETRY MLT: CPT

## 2024-06-18 PROCEDURE — 93005 ELECTROCARDIOGRAM TRACING: CPT | Performed by: INTERNAL MEDICINE

## 2024-06-18 PROCEDURE — 80048 BASIC METABOLIC PNL TOTAL CA: CPT

## 2024-06-18 RX ORDER — METOPROLOL SUCCINATE 25 MG/1
12.5 TABLET, EXTENDED RELEASE ORAL DAILY
Status: DISCONTINUED | OUTPATIENT
Start: 2024-06-19 | End: 2024-06-20

## 2024-06-18 RX ORDER — NITROGLYCERIN 0.4 MG/1
0.4 TABLET SUBLINGUAL EVERY 5 MIN PRN
Status: DISCONTINUED | OUTPATIENT
Start: 2024-06-18 | End: 2024-07-06 | Stop reason: HOSPADM

## 2024-06-18 RX ORDER — METOPROLOL SUCCINATE 25 MG/1
12.5 TABLET, EXTENDED RELEASE ORAL ONCE
Status: COMPLETED | OUTPATIENT
Start: 2024-06-18 | End: 2024-06-18

## 2024-06-18 RX ORDER — AMOXICILLIN AND CLAVULANATE POTASSIUM 875; 125 MG/1; MG/1
1 TABLET, FILM COATED ORAL EVERY 12 HOURS SCHEDULED
Status: DISCONTINUED | OUTPATIENT
Start: 2024-06-18 | End: 2024-06-21

## 2024-06-18 RX ADMIN — ASPIRIN 81 MG 81 MG: 81 TABLET ORAL at 08:38

## 2024-06-18 RX ADMIN — TICAGRELOR 90 MG: 90 TABLET ORAL at 08:38

## 2024-06-18 RX ADMIN — ATORVASTATIN CALCIUM 40 MG: 40 TABLET, FILM COATED ORAL at 22:03

## 2024-06-18 RX ADMIN — IPRATROPIUM BROMIDE 0.5 MG: 0.5 SOLUTION RESPIRATORY (INHALATION) at 21:14

## 2024-06-18 RX ADMIN — IPRATROPIUM BROMIDE 0.5 MG: 0.5 SOLUTION RESPIRATORY (INHALATION) at 16:11

## 2024-06-18 RX ADMIN — TICAGRELOR 90 MG: 90 TABLET ORAL at 21:03

## 2024-06-18 RX ADMIN — METOPROLOL SUCCINATE 25 MG: 25 TABLET, EXTENDED RELEASE ORAL at 09:23

## 2024-06-18 RX ADMIN — SODIUM CHLORIDE, PRESERVATIVE FREE 10 ML: 5 INJECTION INTRAVENOUS at 08:38

## 2024-06-18 RX ADMIN — AMOXICILLIN AND CLAVULANATE POTASSIUM 1 TABLET: 875; 125 TABLET, FILM COATED ORAL at 21:03

## 2024-06-18 RX ADMIN — SODIUM CHLORIDE, PRESERVATIVE FREE 10 ML: 5 INJECTION INTRAVENOUS at 21:03

## 2024-06-18 RX ADMIN — METOPROLOL SUCCINATE 12.5 MG: 25 TABLET, EXTENDED RELEASE ORAL at 22:30

## 2024-06-18 NOTE — DISCHARGE INSTRUCTIONS
Cardiac Rehab:  Cardiac Rehab has been recommended for you. It is an outpatient program of support, exercise, and education led by health professionals and personalized to strengthen your your heart. It helps you make long-term lifestyle changes so you can live a longer healthier life. The program is proven to keep you out of the hospital and reduce your risk of death from heart conditions. The Cardiac Rehab staff will follow-up with you and provide further information regarding the program and hours of operation. For questions or more information, call Cardiac Rehab at [phone].          Follow with Cardiology 2-4 weeks Dr. Price al...     Pulmonary Follow-up Chest X-ray  Go to outpatient radiology at [facility] 2 days prior to your appointment to obtain Chest X-ray prior to your appointment with Carissa Bethea NP this Chest x-ray is done as walk-in procedure no scheduled time is required.

## 2024-06-18 NOTE — PROCEDURES
PROCEDURE NOTE  Date: 6/18/2024   Name: Erin Hanna  YOB: 1945    Procedures  12 lead EKG completed. Results handed to Claudio KLEIN.

## 2024-06-18 NOTE — PLAN OF CARE
Care plan reviewed with patient and family.  Patient and family verbalize understanding of the plan of care and contribute to goal setting.       Problem: Discharge Planning  Goal: Discharge to home or other facility with appropriate resources  Outcome: Progressing  Flowsheets (Taken 6/18/2024 0805)  Discharge to home or other facility with appropriate resources:   Identify barriers to discharge with patient and caregiver   Arrange for needed discharge resources and transportation as appropriate   Identify discharge learning needs (meds, wound care, etc)     Problem: ABCDS Injury Assessment  Goal: Absence of physical injury  Outcome: Progressing  Flowsheets (Taken 6/18/2024 1948)  Absence of Physical Injury: Implement safety measures based on patient assessment     Problem: Safety - Adult  Goal: Free from fall injury  Outcome: Progressing  Flowsheets (Taken 6/18/2024 1948)  Free From Fall Injury: Instruct family/caregiver on patient safety     Problem: Cardiovascular - Adult  Goal: Maintains optimal cardiac output and hemodynamic stability  Outcome: Progressing  Flowsheets (Taken 6/18/2024 0805)  Maintains optimal cardiac output and hemodynamic stability:   Monitor blood pressure and heart rate   Monitor urine output and notify Licensed Independent Practitioner for values outside of normal range   Assess for signs of decreased cardiac output  Goal: Absence of cardiac dysrhythmias or at baseline  Outcome: Progressing  Flowsheets (Taken 6/18/2024 0805)  Absence of cardiac dysrhythmias or at baseline:   Monitor cardiac rate and rhythm   Assess for signs of decreased cardiac output     Problem: Skin/Tissue Integrity - Adult  Goal: Skin integrity remains intact  Outcome: Progressing  Flowsheets (Taken 6/18/2024 0805)  Skin Integrity Remains Intact: Monitor for areas of redness and/or skin breakdown     Problem: Pain  Goal: Verbalizes/displays adequate comfort level or baseline comfort level  Outcome:  Progressing  Flowsheets (Taken 6/18/2024 0805)  Verbalizes/displays adequate comfort level or baseline comfort level:   Encourage patient to monitor pain and request assistance   Assess pain using appropriate pain scale   Administer analgesics based on type and severity of pain and evaluate response   Implement non-pharmacological measures as appropriate and evaluate response

## 2024-06-18 NOTE — CARE COORDINATION
6/18/24, 2:55 PM EDT    DISCHARGE ON GOING EVALUATION    Erin ALDRIDGE Hanna       [facility] day: 1  Location: -05/005-A Reason for admit: STEMI (ST elevation myocardial infarction) (HCC) [I21.3]  ST elevation myocardial infarction (STEMI), unspecified artery (HCC) [I21.3]     Procedures:   6/17 PCI w/ Dr. Fuller     Imaging since last note:   6/17 Renal US:   1. Left renal cortical thinning and atrophy.  2. Mildly elevated bilateral arcuate resistive indices.     6/18 CXR:   1. Borderline heart size. Soft tissue fullness projected over the right side of  the cardiac silhouette, probably due to a moderate size hiatus hernia.  2. No acute infiltrates or effusions are seen. Prior cholecystectomy.    Barriers to Discharge: Intensivist and cardio following. Plan to transfer to stepdown. Some sob and episode of hemoptysis today. ASA. Lipitor. Nebs. Augmentin.     PCP: Claudine Gutierrez APRN - CNP  Readmission Risk Score: 10.7    Patient Goals/Plan/Treatment Preferences:   Plans to return to duplex alone; her children live in attached duplex downstairs. Denies needs. She is independent and drove prior to admission.

## 2024-06-18 NOTE — FLOWSHEET NOTE
06/18/24 1721   Vitals   Pulse (!) 135   BP (!) 124/100   MAP (Calculated) 108   MAP (mmHg) 108   Oxygen Therapy   SpO2 92 %       Tachycardic episode noted at this time. Patient currently asymptomatic during episode. No c/o fatigue, diaphoresis, SOB or chest pain noted. Vitals as shows above. STAT EKG ordered. Cardiology resident [place] aware of findings. Plan of care ongoing.

## 2024-06-18 NOTE — PROGRESS NOTES
06/18/24 1608   Encounter Summary   Encounter Overview/Reason Advance Care Planning   Service Provided For Patient and family together   Referral/Consult From Nurse   Support System Family members   Last Encounter  06/18/24   Complexity of Encounter Low   Begin Time 1600   End Time  1608   Total Time Calculated 8 min   Advance Care Planning   Type ACP conversation   Assessment/Intervention/Outcome   Assessment Calm   Intervention Active listening   Outcome Receptive     This is a spiritual health visit with patient, Erin. Patient was visiting with her son and grandchildren at the time of this visit.  provided a brief explanation of the advance directives and provided blank documents to patient.  educated on state decision-making hierarchy. Patient is aware that she can contact  services if/when she desires to complete advance directives in the future.      team will remain available to support patient/family, PRN.     CARRIE Sol.Jeremi     Spiritual Care Department  [address] [address]  [phone]

## 2024-06-18 NOTE — PROGRESS NOTES
Cardiology Progress Note      Patient:  Erin Hanna  YOB: 1945  MRN: [id number]   Acct: [id number]  Admit Date:  6/17/2024  Primary Cardiologist:  none  H/p per DR Fuller:  \"CHIEF COMPLAINT: FERRARI        HPI: This is a pleasant 78 y.o. female with hx of HTN who presents with progressive dyspnea and sob.  Has been having severe sob for the last couple days.  Tonight, she awoke with sudden onset of sob.  No chest pain, arm pain, jaw pain, or palpitations.  Daughter brought her to the ED.  ECG show inferolateral ST-elevations but there is also concern for anterior elevations.  She has no f/c/ns.  No sick contacts.  No prior autoimmune diseases.  ED did POCUS, no obvious fluid, ED preserved.  Cr 1.2.  Hgb 13.  She is not having significant discomfort now, however ST elevation persists.         All labs, EKG's, diagnostic testing and images as well as cardiac cath, stress testing were reviewed during this encounter    Subjective (Events in last 24 hours):   Pt awake, alert. Sitting in chair today. She has had intermittent maria guadalupe episodes that are short in duration. At 0955 t his am, she had 3.27 second pause, having frequent ectopic beats as well. Possibly had episode of coughing at this time. She has felt better today but still some SOB. Also episode of hemoptysis this am, there is a small amount of bloody mucus in her bag this morning.      Granddaughter was at bedside, discussed with her our current treatment plan.   Objective:   /86   Pulse 85   Temp 97.7 °F (36.5 °C) (Oral)   Resp 24   Ht 1.6 m (5' 3\")   Wt 45.5 kg (100 lb 4.8 oz)   SpO2 96%   BMI 17.77 kg/m²      Vss   TELEMETRY: nsr, frequent ectopy, some maria guadalupe episodes     Physical Exam:  General Appearance: alert and oriented to person, place and time, in no acute distress  Cardiovascular: normal rate, irregular rhythm, normal S1 and S2, no murmurs, rubs, clicks, or gallops, distal pulses intact, no carotid bruits, no

## 2024-06-18 NOTE — PROCEDURES
PROCEDURE NOTE  Date: 6/18/2024   Name: Erin Hanna  YOB: 1945    Procedures  12 lead EKG completed. Results handed to Adriana Ta CET

## 2024-06-18 NOTE — RT PROTOCOL NOTE
RT Inhaler-Nebulizer Bronchodilator Protocol Note    There is a bronchodilator order in the chart from a provider indicating to follow the RT Bronchodilator Protocol and there is an “Initiate RT Inhaler-Nebulizer Bronchodilator Protocol” order as well (see protocol at bottom of note).    CXR Findings:  XR CHEST PORTABLE    Result Date: 6/18/2024  1. Borderline heart size. Soft tissue fullness projected over the right side of the cardiac silhouette, probably due to a moderate size hiatus hernia. 2. No acute infiltrates or effusions are seen. Prior cholecystectomy. **This report has been created using voice recognition software.  It may contain minor errors which are inherent in voice recognition technology.** Electronically signed by Dr. Johnny Gutierres    XR CHEST PORTABLE    Result Date: 6/17/2024  1. Borderline heart size. No effusion. Apparent prior cholecystectomy. 2. Abnormal increased density right cardiophrenic sulcus. Possibilities include pneumonia versus mass lesion. 3. CT thorax recommended for further evaluation. **This report has been created using voice recognition software.  It may contain minor errors which are inherent in voice recognition technology.** Electronically signed by Dr. Johnny Gutierres      The findings from the last RT Protocol Assessment were as follows:   History Pulmonary Disease: Chronic pulmonary disease  Respiratory Pattern: Dyspnea on exertion or RR 21-25 bpm  Breath Sounds: Slightly diminished and/or crackles  Cough: Strong, spontaneous, non-productive  Indication for Bronchodilator Therapy: Decreased or absent breath sounds  Bronchodilator Assessment Score: 6    Aerosolized bronchodilator medication orders have been revised according to the RT Inhaler-Nebulizer Bronchodilator Protocol below.    Respiratory Therapist to perform RT Therapy Protocol Assessment initially then follow the protocol.  Repeat RT Therapy Protocol Assessment PRN for score 0-3 or on second treatment, BID, and

## 2024-06-18 NOTE — PROGRESS NOTES
06/18/24 0824   Encounter Summary   Encounter Overview/Reason Advance Care Planning;Initial Encounter   Service Provided For Patient   Referral/Consult From Nurse   Support System Family members   Last Encounter  06/18/24  (ACP attempt)   Complexity of Encounter Low   Begin Time 0818   End Time  0826   Total Time Calculated 8 min   Advance Care Planning   Type Refused ACP conversation  (Postponed - will contact  when ready)   Assessment/Intervention/Outcome   Assessment Calm   Intervention Active listening   Outcome Engaged in conversation     This is a spiritual health encounter in response to request for ACP conversation with patient, Erin. Patient declined conversation at this time and requested ACP conversation when daughter is present. RN notified to contact  services when daughter arrives today, 6/18/24.      team will remain available to support patient/family, PRN.     Chaplain Beatrice Mcpherson M.Div     Spiritual Care Department  [address] [address]  [phone]

## 2024-06-18 NOTE — PROGRESS NOTES
CRITICAL CARE PROGRESS NOTE      Patient:  Erin Hanna    Unit/Bed:4B-05/005-A  YOB: 1945  MRN: [id number]   PCP: Claudine Gutierrez APRN - CNP  Date of Admission: 6/17/2024  Chief Complaint:-Shortness of breath    Assessment and Plan:    STEMI s/p SANTA x3 to RCA: Presented to ED with shortness of breath.  EKG showing inferolateral ST elevations.  Troponin 136. Interventional cardiology consulted.  S/p PCI of RCA w/ SANTA overlapping x 3.  Cardiology primary team.  DAPT with aspirin and Brilinta.  Lipid panel 6/18/2024: Total cholesterol 139, HDL 64, LDL 60, TG 74.  A1c 6/18/2024 6.3.  Cath also showed 50% Ost to LAD stenosis, 40% left circumflex stenossi, 40% RPDA stenosis, and 95% stenosis of RPAV.  Continue beta-blocker, aspirin, Brilinta, statin.  Cardiology primary, appreciate recommendations.  ANNA on CKD IIIa, resolved: Suspected prerenal in setting of poor oral intake.  Expecting creatinine may worsen in the setting of PCI.  FeNa 1.9 (prerenal vs intrinsic), however more likely prerenal as creatinine improved with IV hydration. No hydronephrosis on renal US. Continue mild fluids at this time.  Daily BMP.  Avoid nephrotoxic agents, hold benazepril.  Strict I's and O's.  Hypertension: On benazepril and amlodipine outpatient.  Currently on hold.  Blood pressure stable with SBP in 130s.  Hypoalbuminemia: 3.2.  Likely in setting of poor general nutrition given age and body habitus.  BMI 17.89.    INITIAL H AND P AND ICU COURSE:  89-year-old female with history of hypertension and CKD 3A presenting with worsening shortness of breath.  This been going on for the past few days.  Woke up very early this morning with severe shortness of breath.  Denied any chest pain/arm pain/jaw pain at that time.  Denied palpitations.  Denied nausea or vomiting.  Was brought to the ED, EKG showed inferolateral ST elevations, elevated troponin at 136.  No prior history of stents.  Interventional cardiology consulted,

## 2024-06-18 NOTE — PROGRESS NOTES
2134 Patient arrived to unit from 4D11 via bed. Patient transferred to ICU bed and placed on continuous ICU bedside monitor. Patient admitted for STEMI (ST elevation myocardial infarction) (HCC) [I21.3]  ST elevation myocardial infarction (STEMI), unspecified artery (HCC) [I21.3]. Vitals obtained. See flowsheets. Patient's IV access includes PIV Rfa. Current infusions and rates of infusion include 0.9 @75ml/hr. Assessment completed by Lacey KLEIN. Two nurse skin assessment completed by Lacey KLEIN and Claudio KLEIN. See flowsheets for assessment details. Policies and procedures of ICU able to be explained to patient at this time. Family member(s)/representative(s) present at time of admission include n/a. Patient rights explained to family member(s)/representatives and patient, as able.  All questions posed by patient's family member(s)/representative(s) and patient answered at this time.        2358 Pt heart rate noted to have increased to 120s when trying to urinate. EKG order placed. Patient denies chest pain, nausea or increased SOB at this time.     0012 EKG shown to Dr. Chet Fuller. No new orders at this time.

## 2024-06-19 LAB
ANION GAP SERPL CALC-SCNC: 9 MEQ/L (ref 8–16)
BUN SERPL-MCNC: 16 MG/DL (ref 7–22)
CALCIUM SERPL-MCNC: 9.3 MG/DL (ref 8.5–10.5)
CHLORIDE SERPL-SCNC: 99 MEQ/L (ref 98–111)
CO2 SERPL-SCNC: 23 MEQ/L (ref 23–33)
CREAT SERPL-MCNC: 0.7 MG/DL (ref 0.4–1.2)
DEPRECATED RDW RBC AUTO: 48.1 FL (ref 35–45)
EKG ATRIAL RATE: 81 BPM
EKG ATRIAL RATE: 90 BPM
EKG P AXIS: 56 DEGREES
EKG P AXIS: 68 DEGREES
EKG P-R INTERVAL: 118 MS
EKG P-R INTERVAL: 118 MS
EKG Q-T INTERVAL: 352 MS
EKG Q-T INTERVAL: 354 MS
EKG QRS DURATION: 84 MS
EKG QRS DURATION: 88 MS
EKG QTC CALCULATION (BAZETT): 408 MS
EKG QTC CALCULATION (BAZETT): 433 MS
EKG R AXIS: -17 DEGREES
EKG R AXIS: -18 DEGREES
EKG T AXIS: 55 DEGREES
EKG T AXIS: 58 DEGREES
EKG VENTRICULAR RATE: 81 BPM
EKG VENTRICULAR RATE: 90 BPM
ERYTHROCYTE [DISTWIDTH] IN BLOOD BY AUTOMATED COUNT: 13.9 % (ref 11.5–14.5)
GFR SERPL CREATININE-BSD FRML MDRD: 88 ML/MIN/1.73M2
GLUCOSE SERPL-MCNC: 112 MG/DL (ref 70–108)
HCT VFR BLD AUTO: 40 % (ref 37–47)
HGB BLD-MCNC: 12.6 GM/DL (ref 12–16)
MAGNESIUM SERPL-MCNC: 1.9 MG/DL (ref 1.6–2.4)
MCH RBC QN AUTO: 29.7 PG (ref 26–33)
MCHC RBC AUTO-ENTMCNC: 31.5 GM/DL (ref 32.2–35.5)
MCV RBC AUTO: 94.3 FL (ref 81–99)
PLATELET # BLD AUTO: 141 THOU/MM3 (ref 130–400)
PMV BLD AUTO: 12.4 FL (ref 9.4–12.4)
POTASSIUM SERPL-SCNC: 4.6 MEQ/L (ref 3.5–5.2)
RBC # BLD AUTO: 4.24 MILL/MM3 (ref 4.2–5.4)
SODIUM SERPL-SCNC: 131 MEQ/L (ref 135–145)
WBC # BLD AUTO: 5.5 THOU/MM3 (ref 4.8–10.8)

## 2024-06-19 PROCEDURE — 2580000003 HC RX 258: Performed by: INTERNAL MEDICINE

## 2024-06-19 PROCEDURE — 83735 ASSAY OF MAGNESIUM: CPT

## 2024-06-19 PROCEDURE — 99232 SBSQ HOSP IP/OBS MODERATE 35: CPT | Performed by: NURSE PRACTITIONER

## 2024-06-19 PROCEDURE — 2140000000 HC CCU INTERMEDIATE R&B

## 2024-06-19 PROCEDURE — 6370000000 HC RX 637 (ALT 250 FOR IP): Performed by: INTERNAL MEDICINE

## 2024-06-19 PROCEDURE — 6370000000 HC RX 637 (ALT 250 FOR IP)

## 2024-06-19 PROCEDURE — 6360000002 HC RX W HCPCS: Performed by: NURSE PRACTITIONER

## 2024-06-19 PROCEDURE — 93010 ELECTROCARDIOGRAM REPORT: CPT | Performed by: INTERNAL MEDICINE

## 2024-06-19 PROCEDURE — 99232 SBSQ HOSP IP/OBS MODERATE 35: CPT | Performed by: INTERNAL MEDICINE

## 2024-06-19 PROCEDURE — 80048 BASIC METABOLIC PNL TOTAL CA: CPT

## 2024-06-19 PROCEDURE — 6360000002 HC RX W HCPCS

## 2024-06-19 PROCEDURE — 6370000000 HC RX 637 (ALT 250 FOR IP): Performed by: PHYSICIAN ASSISTANT

## 2024-06-19 PROCEDURE — 85027 COMPLETE CBC AUTOMATED: CPT

## 2024-06-19 PROCEDURE — 36415 COLL VENOUS BLD VENIPUNCTURE: CPT

## 2024-06-19 PROCEDURE — 94640 AIRWAY INHALATION TREATMENT: CPT

## 2024-06-19 RX ORDER — FUROSEMIDE 10 MG/ML
20 INJECTION INTRAMUSCULAR; INTRAVENOUS ONCE
Status: COMPLETED | OUTPATIENT
Start: 2024-06-19 | End: 2024-06-19

## 2024-06-19 RX ADMIN — TICAGRELOR 90 MG: 90 TABLET ORAL at 08:12

## 2024-06-19 RX ADMIN — ASPIRIN 81 MG 81 MG: 81 TABLET ORAL at 08:12

## 2024-06-19 RX ADMIN — ATORVASTATIN CALCIUM 40 MG: 40 TABLET, FILM COATED ORAL at 21:39

## 2024-06-19 RX ADMIN — SODIUM CHLORIDE, PRESERVATIVE FREE 10 ML: 5 INJECTION INTRAVENOUS at 20:38

## 2024-06-19 RX ADMIN — FUROSEMIDE 20 MG: 10 INJECTION, SOLUTION INTRAMUSCULAR; INTRAVENOUS at 13:25

## 2024-06-19 RX ADMIN — AMOXICILLIN AND CLAVULANATE POTASSIUM 1 TABLET: 875; 125 TABLET, FILM COATED ORAL at 08:12

## 2024-06-19 RX ADMIN — TICAGRELOR 90 MG: 90 TABLET ORAL at 20:37

## 2024-06-19 RX ADMIN — IPRATROPIUM BROMIDE 0.5 MG: 0.5 SOLUTION RESPIRATORY (INHALATION) at 09:25

## 2024-06-19 RX ADMIN — SODIUM CHLORIDE, PRESERVATIVE FREE 10 ML: 5 INJECTION INTRAVENOUS at 08:12

## 2024-06-19 RX ADMIN — METOPROLOL SUCCINATE 12.5 MG: 25 TABLET, EXTENDED RELEASE ORAL at 13:23

## 2024-06-19 RX ADMIN — AMOXICILLIN AND CLAVULANATE POTASSIUM 1 TABLET: 875; 125 TABLET, FILM COATED ORAL at 21:39

## 2024-06-19 ASSESSMENT — PAIN SCALES - GENERAL: PAINLEVEL_OUTOF10: 0

## 2024-06-19 NOTE — PLAN OF CARE
Problem: Discharge Planning  Goal: Discharge to home or other facility with appropriate resources  6/19/2024 0410 by Claudio Sarah RN  Flowsheets (Taken 6/18/2024 2000)  Discharge to home or other facility with appropriate resources:   Identify barriers to discharge with patient and caregiver   Arrange for needed discharge resources and transportation as appropriate   Identify discharge learning needs (meds, wound care, etc)   Refer to discharge planning if patient needs post-hospital services based on physician order or complex needs related to functional status, cognitive ability or social support system  6/18/2024 1948 by Adriana Irving RN  Outcome: Progressing  Flowsheets (Taken 6/18/2024 0805)  Discharge to home or other facility with appropriate resources:   Identify barriers to discharge with patient and caregiver   Arrange for needed discharge resources and transportation as appropriate   Identify discharge learning needs (meds, wound care, etc)     Problem: ABCDS Injury Assessment  Goal: Absence of physical injury  6/19/2024 0410 by Claudio Sarah RN  Flowsheets (Taken 6/18/2024 2000)  Absence of Physical Injury: Implement safety measures based on patient assessment  6/18/2024 1948 by Adriana Irving RN  Outcome: Progressing  Flowsheets (Taken 6/18/2024 1948)  Absence of Physical Injury: Implement safety measures based on patient assessment     Problem: Safety - Adult  Goal: Free from fall injury  6/19/2024 0410 by Claudio Sarah RN  Flowsheets (Taken 6/18/2024 2000)  Free From Fall Injury:   Instruct family/caregiver on patient safety   Based on caregiver fall risk screen, instruct family/caregiver to ask for assistance with transferring infant if caregiver noted to have fall risk factors  6/18/2024 1948 by Adriana Irving RN  Outcome: Progressing  Flowsheets (Taken 6/18/2024 1948)  Free From Fall Injury: Instruct family/caregiver on patient safety     Problem: Cardiovascular - Adult  Goal:

## 2024-06-19 NOTE — PROGRESS NOTES
MI Documentation    MI: : STEMI    PCI: YES    EF: 40-45%  ASA w/i first 24 hours: YES   BB w/i first 24 hours: YES       ------------------------------------------  1.  ASA: YES  2.  BB: YES  3.  ACE/ARB: CONTRAINDICATION ANNA  4.  Statin: YES  5.  P2Y12: YES    ))))))REMEMBER NTG SL AT DISCHARGE((((((

## 2024-06-19 NOTE — PROGRESS NOTES
Cardiology Progress Note      Patient:  Erin Hanna  YOB: 1945  MRN: [id number]   Acct: [id number]  Admit Date:  6/17/2024  Primary Cardiologist: none  Seen by Dr. Fuller     Per prior cardiology consult note-  CHIEF COMPLAINT: FERRARI        HPI: This is a pleasant 78 y.o. female with hx of HTN who presents with progressive dyspnea and sob.  Has been having severe sob for the last couple days.  Tonight, she awoke with sudden onset of sob.  No chest pain, arm pain, jaw pain, or palpitations.  Daughter brought her to the ED.  ECG show inferolateral ST-elevations but there is also concern for anterior elevations.  She has no f/c/ns.  No sick contacts.  No prior autoimmune diseases.  ED did POCUS, no obvious fluid, ED preserved.  Cr 1.2.  Hgb 13.  She is not having significant discomfort now, however ST elevation persists.        Subjective (Events in last 24 hours):   Pt up in chair   No chest pain - does still co SOB - per pt never had this before   On 1L O2  Bp stable     Overnight had episode of tachycardia (SVT)  after Atrovent treatment last rahel around 2130    Tele SR occ PAC noted today   Hasn't received BB today     Objective:   /76   Pulse 72   Temp 97.8 °F (36.6 °C) (Oral)   Resp 25   Ht 1.6 m (5' 3\")   Wt 45.5 kg (100 lb 4.8 oz)   SpO2 98%   BMI 17.77 kg/m²        TELEMETRY: SR occ PAC     Physical Exam:  General Appearance: alert and oriented to person, place and time, in no acute distress  Cardiovascular: normal rate, regular rhythm, normal S1 and S2, no murmurs, rubs, clicks, or gallops, distal pulses intact,   Pulmonary/Chest: clear upper - diminished air exchange posterior to auscultation bilaterally- no wheezes, rales or rhonchi, normal air movement, no respiratory distress  Abdomen: soft, non-tender, non-distended, normal bowel sounds, no masses Extremities: no cyanosis, clubbing or edema, pulses present     Musculoskeletal: normal range of motion, no joint swelling,  03:59 AM    GLUCOSE 101 09/09/2022 11:29 AM    CALCIUM 9.1 06/18/2024 03:59 AM       Hepatic Function Panel:    Lab Results   Component Value Date/Time    ALKPHOS 74 06/17/2024 04:57 AM    ALT 9 06/17/2024 04:57 AM    AST 26 06/17/2024 04:57 AM    BILITOT 0.4 06/17/2024 04:57 AM    BILIDIR 0.2 06/17/2024 04:57 AM    IBILI 0.29 07/15/2021 10:28 AM       Magnesium:    Lab Results   Component Value Date/Time    MG 1.9 06/19/2024 03:30 AM       PT/INR:    Lab Results   Component Value Date/Time    INR 1.06 06/17/2024 04:57 AM       HgBA1c:    Lab Results   Component Value Date/Time    LABA1C 6.3 06/18/2024 03:59 AM       FLP:    Lab Results   Component Value Date/Time    TRIG 74 06/18/2024 03:59 AM    HDL 64 06/18/2024 03:59 AM       TSH:    Lab Results   Component Value Date/Time    TSH 1.61 08/18/2023 10:24 AM         Assessment:    Inferior stemi    Bradycardia with 3.27 sec pause 6/18/24---> none since     SVT after Atrovent treatment last rahel - self limiting - today SR PAC's     Hemoptysis - none further       Sp cardiac cath 6/17/24  S/p PCI of the RCA x 3 SANTA with OCT guidance   - Residual 50% LAD, 40% Lcx, 40% RPDA     ICDMP ef 45-50% cath  40-45% echo 6/18/24      HTN stable   HLP LDL 60      ANNA / CKD stage 3a - back to baseline     ? COPD  / tobacco abuse       Plan:  Continue current cardiac treatment  Trial toprol 12.5 mg po day   Lasix 20 mg iv x1 for SOB   Ok for Stepdown   Transfer care to hospital service please   PT\OT ordered        Patient and provider goals: Begin Cardiac Rehab and Start exercise program       Cardiac Rehab: Yes      Discharge Medications for PCI/MI (performed or attempted):   ASA: yes  Statin: yes  P2Y12 Inhibitor: yes  Beta Blocker: yes  ACE / ARB: no  Nitro SL: yes      Discharge Medications for ICD, Cardiomyopathy, CHF:  Beta Blocker: yes  ACE Inhibitor/ARB: not at present         Electronically signed by JARROD Ferreira CNP on 6/19/2024 at 11:46 AM

## 2024-06-19 NOTE — SIGNIFICANT EVENT
Spoke with cardiology, okay to transfer out of the ICU.  Cardiology currently primary, was asked to reach out to hospitalist for them to take over as primary for this patient when out of the ICU.  Patient assigned to 3B25.  Hospitalist, Dr. Joaquim Petersen, was contacted via [employer] regarding the transfer of the patient.  [employer] message acknowledged.    Electronically Signed by  Jose Joe DO, MBA  PGY-1 Internal Medicine Resident  [facility]  On 6/19/2024 at 2:40 PM    This report has been created using voice recognition software. It may contain minor errors which are inherent in voice recognition technology

## 2024-06-19 NOTE — RT PROTOCOL NOTE
RT Inhaler-Nebulizer Bronchodilator Protocol Note    There is a bronchodilator order in the chart from a provider indicating to follow the RT Bronchodilator Protocol and there is an “Initiate RT Inhaler-Nebulizer Bronchodilator Protocol” order as well (see protocol at bottom of note).    CXR Findings:  XR CHEST PORTABLE    Result Date: 6/18/2024  1. Borderline heart size. Soft tissue fullness projected over the right side of the cardiac silhouette, probably due to a moderate size hiatus hernia. 2. No acute infiltrates or effusions are seen. Prior cholecystectomy. **This report has been created using voice recognition software.  It may contain minor errors which are inherent in voice recognition technology.** Electronically signed by Dr. Johnny Gutierres    XR CHEST PORTABLE    Result Date: 6/17/2024  1. Borderline heart size. No effusion. Apparent prior cholecystectomy. 2. Abnormal increased density right cardiophrenic sulcus. Possibilities include pneumonia versus mass lesion. 3. CT thorax recommended for further evaluation. **This report has been created using voice recognition software.  It may contain minor errors which are inherent in voice recognition technology.** Electronically signed by Dr. Johnny Gutierres      The findings from the last RT Protocol Assessment were as follows:   History Pulmonary Disease: Chronic pulmonary disease  Respiratory Pattern: Regular pattern and RR 12-20 bpm  Breath Sounds: Slightly diminished and/or crackles  Cough: Strong, spontaneous, non-productive  Indication for Bronchodilator Therapy: Decreased or absent breath sounds  Bronchodilator Assessment Score: 4    Aerosolized bronchodilator medication orders have been revised according to the RT Inhaler-Nebulizer Bronchodilator Protocol below.    Respiratory Therapist to perform RT Therapy Protocol Assessment initially then follow the protocol.  Repeat RT Therapy Protocol Assessment PRN for score 0-3 or on second treatment, BID, and PRN  for scores above 3.    No Indications - adjust the frequency to every 6 hours PRN wheezing or bronchospasm, if no treatments needed after 48 hours then discontinue using Per Protocol order mode.     If indication present, adjust the RT bronchodilator orders based on the Bronchodilator Assessment Score as indicated below.  Use Inhaler orders unless patient has one or more of the following: on home nebulizer, not able to hold breath for 10 seconds, is not alert and oriented, cannot activate and use MDI correctly, or respiratory rate 25 breaths per minute or more, then use the equivalent nebulizer order(s) with same Frequency and PRN reasons based on the score.  If a patient is on this medication at home then do not decrease Frequency below that used at home.    0-3 - enter or revise RT bronchodilator order(s) to equivalent RT Bronchodilator order with Frequency of every 4 hours PRN for wheezing or increased work of breathing using Per Protocol order mode.        4-6 - enter or revise RT Bronchodilator order(s) to two equivalent RT bronchodilator orders with one order with BID Frequency and one order with Frequency of every 4 hours PRN wheezing or increased work of breathing using Per Protocol order mode.        7-10 - enter or revise RT Bronchodilator order(s) to two equivalent RT bronchodilator orders with one order with TID Frequency and one order with Frequency of every 4 hours PRN wheezing or increased work of breathing using Per Protocol order mode.       11-13 - enter or revise RT Bronchodilator order(s) to one equivalent RT bronchodilator order with QID Frequency and an Albuterol order with Frequency of every 4 hours PRN wheezing or increased work of breathing using Per Protocol order mode.      Greater than 13 - enter or revise RT Bronchodilator order(s) to one equivalent RT bronchodilator order with every 4 hours Frequency and an Albuterol order with Frequency of every 2 hours PRN wheezing or increased work

## 2024-06-19 NOTE — PROGRESS NOTES
CRITICAL CARE PROGRESS NOTE      Patient:  Erin Hanna    Unit/Bed:4B-05/005-A  YOB: 1945  MRN: [id number]   PCP: Claudine Gutierrez APRN - CNP  Date of Admission: 6/17/2024  Chief Complaint:-Shortness of breath    Assessment and Plan:    STEMI s/p SANTA x3 to RCA: Presented to ED with shortness of breath.  EKG showing inferolateral ST elevations.  Troponin 136. Interventional cardiology consulted.  S/p PCI of RCA w/ SANTA overlapping x 3.  Cardiology primary team.  DAPT with aspirin and Brilinta.  Lipid panel 6/18/2024: Total cholesterol 139, HDL 64, LDL 60, TG 74.  A1c 6/18/2024 6.3.  Cath also showed 50% Ost to LAD stenosis, 40% left circumflex stenossi, 40% RPDA stenosis, and 95% stenosis of RPAV.  Continue beta-blocker, aspirin, Brilinta, statin.  Cardiology primary, appreciate recommendations.  Bronchitis: Patient developing increasing sputum production with minimal red streaks.  Patient on DAPT.  Pneumonia panel showing staph, no resistant gene.  Respiratory culture showing many segmented neutrophils.  Started on Augmentin yesterday, will continue with this.  ANNA on CKD IIIa, resolved: Suspected prerenal in setting of poor oral intake.  Expecting creatinine may worsen in the setting of PCI.  FeNa 1.9 (prerenal vs intrinsic), however more likely prerenal as creatinine improved with IV hydration. No hydronephrosis on renal US. Continue mild fluids at this time.  Daily BMP.  Avoid nephrotoxic agents, hold benazepril.  Strict I's and O's.  Hypertension: On benazepril and amlodipine outpatient.  Currently on hold.  Blood pressure stable with SBP in 130s.  Hypoalbuminemia: 3.2.  Likely in setting of poor general nutrition given age and body habitus.  BMI 17.89.    INITIAL H AND P AND ICU COURSE:  89-year-old female with history of hypertension and CKD 3A presenting with worsening shortness of breath.  This been going on for the past few days.  Woke up very early this morning with severe shortness of

## 2024-06-19 NOTE — PROGRESS NOTES
Results   Component Value Date/Time    NITRU NEGATIVE 11/14/2016 06:40 PM    BLOODU NEGATIVE 11/14/2016 06:40 PM    GLUCOSEU NEGATIVE 11/14/2016 06:40 PM       Radiology:  XR CHEST PORTABLE   Final Result   1. Borderline heart size. Soft tissue fullness projected over the right side of   the cardiac silhouette, probably due to a moderate size hiatus hernia.   2. No acute infiltrates or effusions are seen. Prior cholecystectomy.            **This report has been created using voice recognition software.  It may contain   minor errors which are inherent in voice recognition technology.**      Electronically signed by Dr. Johnny Gutierres       RENAL COMPLETE   Final Result   1. Left renal cortical thinning and atrophy.   2. Mildly elevated bilateral arcuate resistive indices.            Electronically signed by Dr. Tyrese Sarah      XR CHEST PORTABLE   Final Result   1. Borderline heart size. No effusion. Apparent prior cholecystectomy.   2. Abnormal increased density right cardiophrenic sulcus. Possibilities include   pneumonia versus mass lesion.   3. CT thorax recommended for further evaluation.      **This report has been created using voice recognition software.  It may contain   minor errors which are inherent in voice recognition technology.**      Electronically signed by Dr. Johnny Gutierres        Echo (TTE) complete (PRN contrast/bubble/strain/3D)    Result Date: 6/18/2024    Left Ventricle: Mildly reduced left ventricular systolic function with a visually estimated EF of 40 - 45%. Left ventricle size is normal. Normal wall thickness. Mild global hypokinesis present. Grade I diastolic dysfunction with normal LAP.   Aortic Valve: Trileaflet valve. Moderately thickened cusp. Moderately calcified cusp.   Mitral Valve: Moderately thickened leaflet, at the posterior leaflet. Moderately calcified leaflet, at the posterior leaflet.   Tricuspid Valve: Mild regurgitation. The estimated RVSP is 61 mmHg. The  estimated RVSP is 61 mmHg.   Left Atrium: Left atrium is severely dilated.   Right Atrium: Right atrium is moderately dilated.   IVC/SVC: IVC diameter is greater than 21 mm and decreases less than 50% during inspiration; therefore the estimated right atrial pressure is elevated (~15 mmHg). IVC is dilated.   Image quality is technically difficult. Technically difficult study due to patient's heart rhythm and procedure performed with the patient in a sitting position.     XR CHEST PORTABLE    Result Date: 6/18/2024  PROCEDURE: XR CHEST PORTABLE CLINICAL INFORMATION: SOB COMPARISON: 6/17/2024 TECHNIQUE: A single mobile view of the chest was obtained.     1. Borderline heart size. Soft tissue fullness projected over the right side of the cardiac silhouette, probably due to a moderate size hiatus hernia. 2. No acute infiltrates or effusions are seen. Prior cholecystectomy. **This report has been created using voice recognition software.  It may contain minor errors which are inherent in voice recognition technology.** Electronically signed by Dr. Johnny Gutierres    XR CHEST PORTABLE    Result Date: 6/17/2024  PROCEDURE: XR CHEST PORTABLE CLINICAL INFORMATION: SOB, LE edema. COMPARISON: 12/12/2008 TECHNIQUE: A single mobile view of the chest was obtained.     1. Borderline heart size. No effusion. Apparent prior cholecystectomy. 2. Abnormal increased density right cardiophrenic sulcus. Possibilities include pneumonia versus mass lesion. 3. CT thorax recommended for further evaluation. **This report has been created using voice recognition software.  It may contain minor errors which are inherent in voice recognition technology.** Electronically signed by Dr. Johnny Gutierres    US RENAL COMPLETE    Result Date: 6/17/2024  PROCEDURE: US RENAL COMPLETE CLINICAL INFORMATION: Acute kidney injury TECHNIQUE: Ultrasound of the kidneys and urinary bladder was performed. Grayscale and color images were obtained. COMPARISON: Renal

## 2024-06-19 NOTE — PLAN OF CARE
Problem: Discharge Planning  Goal: Discharge to home or other facility with appropriate resources  6/19/2024 1019 by Marilee Reddy  Outcome: Progressing     Problem: ABCDS Injury Assessment  Goal: Absence of physical injury  6/19/2024 1019 by Marilee Reddy  Outcome: Progressing     Problem: Safety - Adult  Goal: Free from fall injury  6/19/2024 1019 by Marilee Reddy  Outcome: Progressing     Problem: Cardiovascular - Adult  Goal: Maintains optimal cardiac output and hemodynamic stability  6/19/2024 1019 by Marilee Reddy  Outcome: Progressing     Problem: Cardiovascular - Adult  Goal: Absence of cardiac dysrhythmias or at baseline  6/19/2024 1019 by Marilee Reddy  Outcome: Progressing     Problem: Skin/Tissue Integrity - Adult  Goal: Skin integrity remains intact  6/19/2024 1019 by Marilee Reddy  Outcome: Progressing     Problem: Pain  Goal: Verbalizes/displays adequate comfort level or baseline comfort level  6/19/2024 1019 by Marilee Reddy  Outcome: Progressing     Problem: Skin/Tissue Integrity  Goal: Absence of new skin breakdown  Description: 1.  Monitor for areas of redness and/or skin breakdown  2.  Assess vascular access sites hourly  3.  Every 4-6 hours minimum:  Change oxygen saturation probe site  4.  Every 4-6 hours:  If on nasal continuous positive airway pressure, respiratory therapy assess nares and determine need for appliance change or resting period.  6/19/2024 1019 by Marilee Reddy  Outcome: Progressing     Care plan reviewed with patient.  Patient verbalizes understanding of the plan of care and contributes to goal setting.        [Negative] : Heme/Lymph

## 2024-06-20 ENCOUNTER — APPOINTMENT (OUTPATIENT)
Dept: GENERAL RADIOLOGY | Age: 79
End: 2024-06-20
Payer: MEDICARE

## 2024-06-20 PROBLEM — E43 SEVERE MALNUTRITION (HCC): Status: ACTIVE | Noted: 2024-06-20

## 2024-06-20 LAB
ANION GAP SERPL CALC-SCNC: 11 MEQ/L (ref 8–16)
BACTERIA SPEC RESP CULT: NORMAL
BUN SERPL-MCNC: 15 MG/DL (ref 7–22)
CA-I BLD ISE-SCNC: 1.08 MMOL/L (ref 1.12–1.32)
CALCIUM SERPL-MCNC: 9.1 MG/DL (ref 8.5–10.5)
CHLORIDE SERPL-SCNC: 95 MEQ/L (ref 98–111)
CO2 SERPL-SCNC: 24 MEQ/L (ref 23–33)
CREAT SERPL-MCNC: 0.8 MG/DL (ref 0.4–1.2)
DEPRECATED RDW RBC AUTO: 43.5 FL (ref 35–45)
EKG ATRIAL RATE: 99 BPM
EKG P AXIS: 75 DEGREES
EKG P-R INTERVAL: 114 MS
EKG Q-T INTERVAL: 336 MS
EKG QRS DURATION: 90 MS
EKG QTC CALCULATION (BAZETT): 431 MS
EKG R AXIS: -13 DEGREES
EKG T AXIS: 64 DEGREES
EKG VENTRICULAR RATE: 99 BPM
ERYTHROCYTE [DISTWIDTH] IN BLOOD BY AUTOMATED COUNT: 13.1 % (ref 11.5–14.5)
GFR SERPL CREATININE-BSD FRML MDRD: 75 ML/MIN/1.73M2
GLUCOSE SERPL-MCNC: 115 MG/DL (ref 70–108)
GRAM STN SPEC: NORMAL
HCT VFR BLD AUTO: 40.5 % (ref 37–47)
HGB BLD-MCNC: 13.3 GM/DL (ref 12–16)
MAGNESIUM SERPL-MCNC: 1.7 MG/DL (ref 1.6–2.4)
MCH RBC QN AUTO: 29.7 PG (ref 26–33)
MCHC RBC AUTO-ENTMCNC: 32.8 GM/DL (ref 32.2–35.5)
MCV RBC AUTO: 90.4 FL (ref 81–99)
PLATELET # BLD AUTO: 154 THOU/MM3 (ref 130–400)
PMV BLD AUTO: 12 FL (ref 9.4–12.4)
POTASSIUM SERPL-SCNC: 3.6 MEQ/L (ref 3.5–5.2)
RBC # BLD AUTO: 4.48 MILL/MM3 (ref 4.2–5.4)
SODIUM SERPL-SCNC: 130 MEQ/L (ref 135–145)
WBC # BLD AUTO: 7.3 THOU/MM3 (ref 4.8–10.8)

## 2024-06-20 PROCEDURE — 97162 PT EVAL MOD COMPLEX 30 MIN: CPT

## 2024-06-20 PROCEDURE — 6370000000 HC RX 637 (ALT 250 FOR IP): Performed by: PHYSICIAN ASSISTANT

## 2024-06-20 PROCEDURE — 80048 BASIC METABOLIC PNL TOTAL CA: CPT

## 2024-06-20 PROCEDURE — 99232 SBSQ HOSP IP/OBS MODERATE 35: CPT | Performed by: PHYSICIAN ASSISTANT

## 2024-06-20 PROCEDURE — 82330 ASSAY OF CALCIUM: CPT

## 2024-06-20 PROCEDURE — 85027 COMPLETE CBC AUTOMATED: CPT

## 2024-06-20 PROCEDURE — 94761 N-INVAS EAR/PLS OXIMETRY MLT: CPT

## 2024-06-20 PROCEDURE — 83735 ASSAY OF MAGNESIUM: CPT

## 2024-06-20 PROCEDURE — 97116 GAIT TRAINING THERAPY: CPT

## 2024-06-20 PROCEDURE — 97530 THERAPEUTIC ACTIVITIES: CPT

## 2024-06-20 PROCEDURE — 2700000000 HC OXYGEN THERAPY PER DAY

## 2024-06-20 PROCEDURE — 71046 X-RAY EXAM CHEST 2 VIEWS: CPT

## 2024-06-20 PROCEDURE — 97166 OT EVAL MOD COMPLEX 45 MIN: CPT

## 2024-06-20 PROCEDURE — 6370000000 HC RX 637 (ALT 250 FOR IP)

## 2024-06-20 PROCEDURE — 94640 AIRWAY INHALATION TREATMENT: CPT

## 2024-06-20 PROCEDURE — 2580000003 HC RX 258: Performed by: INTERNAL MEDICINE

## 2024-06-20 PROCEDURE — 99232 SBSQ HOSP IP/OBS MODERATE 35: CPT | Performed by: INTERNAL MEDICINE

## 2024-06-20 PROCEDURE — 97535 SELF CARE MNGMENT TRAINING: CPT

## 2024-06-20 PROCEDURE — 6370000000 HC RX 637 (ALT 250 FOR IP): Performed by: INTERNAL MEDICINE

## 2024-06-20 PROCEDURE — 6360000002 HC RX W HCPCS

## 2024-06-20 PROCEDURE — 93005 ELECTROCARDIOGRAM TRACING: CPT | Performed by: INTERNAL MEDICINE

## 2024-06-20 PROCEDURE — 2140000000 HC CCU INTERMEDIATE R&B

## 2024-06-20 PROCEDURE — 93010 ELECTROCARDIOGRAM REPORT: CPT | Performed by: INTERNAL MEDICINE

## 2024-06-20 PROCEDURE — 36415 COLL VENOUS BLD VENIPUNCTURE: CPT

## 2024-06-20 RX ORDER — METOPROLOL SUCCINATE 25 MG/1
12.5 TABLET, EXTENDED RELEASE ORAL ONCE
Status: COMPLETED | OUTPATIENT
Start: 2024-06-20 | End: 2024-06-20

## 2024-06-20 RX ORDER — POTASSIUM CHLORIDE 7.45 MG/ML
10 INJECTION INTRAVENOUS PRN
Status: DISCONTINUED | OUTPATIENT
Start: 2024-06-20 | End: 2024-07-06 | Stop reason: HOSPADM

## 2024-06-20 RX ORDER — METOPROLOL SUCCINATE 25 MG/1
25 TABLET, EXTENDED RELEASE ORAL DAILY
Status: DISCONTINUED | OUTPATIENT
Start: 2024-06-21 | End: 2024-06-24

## 2024-06-20 RX ORDER — MAGNESIUM SULFATE 1 G/100ML
1000 INJECTION INTRAVENOUS ONCE
Status: COMPLETED | OUTPATIENT
Start: 2024-06-20 | End: 2024-06-20

## 2024-06-20 RX ORDER — HYDROXYZINE PAMOATE 25 MG/1
25 CAPSULE ORAL ONCE
Status: COMPLETED | OUTPATIENT
Start: 2024-06-20 | End: 2024-06-20

## 2024-06-20 RX ORDER — ALPRAZOLAM 0.25 MG/1
0.25 TABLET ORAL ONCE
Status: DISCONTINUED | OUTPATIENT
Start: 2024-06-20 | End: 2024-07-06 | Stop reason: HOSPADM

## 2024-06-20 RX ORDER — POTASSIUM CHLORIDE 20 MEQ/1
40 TABLET, EXTENDED RELEASE ORAL PRN
Status: DISCONTINUED | OUTPATIENT
Start: 2024-06-20 | End: 2024-07-06 | Stop reason: HOSPADM

## 2024-06-20 RX ORDER — POTASSIUM CHLORIDE 20 MEQ/1
40 TABLET, EXTENDED RELEASE ORAL ONCE
Status: COMPLETED | OUTPATIENT
Start: 2024-06-20 | End: 2024-06-20

## 2024-06-20 RX ORDER — POTASSIUM CHLORIDE 750 MG/1
20 TABLET, FILM COATED, EXTENDED RELEASE ORAL PRN
Status: DISCONTINUED | OUTPATIENT
Start: 2024-06-20 | End: 2024-07-06 | Stop reason: HOSPADM

## 2024-06-20 RX ADMIN — IPRATROPIUM BROMIDE 0.5 MG: 0.5 SOLUTION RESPIRATORY (INHALATION) at 09:25

## 2024-06-20 RX ADMIN — POTASSIUM CHLORIDE 40 MEQ: 1500 TABLET, EXTENDED RELEASE ORAL at 10:00

## 2024-06-20 RX ADMIN — SODIUM CHLORIDE, PRESERVATIVE FREE 10 ML: 5 INJECTION INTRAVENOUS at 08:16

## 2024-06-20 RX ADMIN — AMOXICILLIN AND CLAVULANATE POTASSIUM 1 TABLET: 875; 125 TABLET, FILM COATED ORAL at 08:16

## 2024-06-20 RX ADMIN — METOPROLOL SUCCINATE 12.5 MG: 25 TABLET, EXTENDED RELEASE ORAL at 10:00

## 2024-06-20 RX ADMIN — TICAGRELOR 90 MG: 90 TABLET ORAL at 08:16

## 2024-06-20 RX ADMIN — ASPIRIN 81 MG 81 MG: 81 TABLET ORAL at 08:16

## 2024-06-20 RX ADMIN — SODIUM CHLORIDE, PRESERVATIVE FREE 10 ML: 5 INJECTION INTRAVENOUS at 20:04

## 2024-06-20 RX ADMIN — METOPROLOL SUCCINATE 12.5 MG: 25 TABLET, EXTENDED RELEASE ORAL at 08:16

## 2024-06-20 RX ADMIN — IPRATROPIUM BROMIDE 0.5 MG: 0.5 SOLUTION RESPIRATORY (INHALATION) at 17:11

## 2024-06-20 RX ADMIN — HYDROXYZINE PAMOATE 25 MG: 25 CAPSULE ORAL at 03:33

## 2024-06-20 RX ADMIN — ATORVASTATIN CALCIUM 40 MG: 40 TABLET, FILM COATED ORAL at 20:03

## 2024-06-20 RX ADMIN — AMOXICILLIN AND CLAVULANATE POTASSIUM 1 TABLET: 875; 125 TABLET, FILM COATED ORAL at 20:03

## 2024-06-20 RX ADMIN — MAGNESIUM SULFATE HEPTAHYDRATE 1000 MG: 1 INJECTION, SOLUTION INTRAVENOUS at 04:43

## 2024-06-20 RX ADMIN — TICAGRELOR 90 MG: 90 TABLET ORAL at 20:04

## 2024-06-20 ASSESSMENT — PAIN SCALES - GENERAL
PAINLEVEL_OUTOF10: 0
PAINLEVEL_OUTOF10: 0

## 2024-06-20 NOTE — PROGRESS NOTES
[facility]  INPATIENT OCCUPATIONAL THERAPY  STRZ CCU-STEPDOWN 3B  EVALUATION    Time:   Time In: 820  Time Out: 08  Timed Code Treatment Minutes: 23 Minutes  Minutes: 37          Date: 2024  Patient Name: Erin Hanna,   Gender: female      MRN: [id number]  : 1945  (79 y.o.)  Referring Practitioner: Gifty Castle APRN - CNP  Diagnosis: STEMI  Additional Pertinent Hx: Per EMR: 89-year-old female with history of hypertension and CKD 3A presenting with worsening shortness of breath.  This been going on for the past few days.  Woke up very early this morning with severe shortness of breath.  Denied any chest pain/arm pain/jaw pain at that time.  Denied palpitations.  Denied nausea or vomiting.  Was brought to the ED, EKG showed inferolateral ST elevations, elevated troponin at 136.  No prior history of stents.  Interventional cardiology consulted, patient taken for emergent cath, PCI w/ SANTA x 3 to RCA.    Restrictions/Precautions:  Restrictions/Precautions: Fall Risk    Subjective  Chart Reviewed: Yes, Orders, Progress Notes, History and Physical  Patient assessed for rehabilitation services?: Yes  Family / Caregiver Present: No    Subjective: Pt seated at EOB upon arrival, with RN present to give meds. Pt agreeable to OT session although c/o overall fatigue and weakness.    Pain: 0/10: =    Vitals: Oxygen: 92% on 1L O2 via NC  Heart Rate: 92 bpm on arrival; increased to 130s with coughing/mobility, although quickly returns to 90s when at rest.    Social/Functional History:  Lives With: Alone (although daughter lives in attached home; daughter works)  Type of Home: House (D[place])  Home Layout: One level  Home Equipment: None   Bathroom Shower/Tub: Tub/Shower unit  Bathroom Toilet: Standard  Bathroom Equipment: None       ADL Assistance: Independent  Homemaking Assistance: Independent  Ambulation Assistance: Independent  Transfer Assistance: Independent    Active : Yes

## 2024-06-20 NOTE — PROCEDURES
PROCEDURE NOTE  Date: 6/20/2024   Name: Erin Hanna  YOB: 1945    Procedures  12 lead EKG completed. Results handed to Emili KLEIN.

## 2024-06-20 NOTE — CARE COORDINATION
DISCHARGE PLANNING EVALUATION  6/20/24, 2:58 PM EDT    Reason for Referral: Therapy recommending ECF vs HH  Decision Maker: Patient   Current Services: None  New Services Requested: ECF vs HH- she is still thinking about the plan.   Family/ Social/ Home environment: From home alone.  Was independent prior to admission.  Her daughter lives next door, but works full time.  She has a lot of family who are very supportive.   Payment Source:[employer] Medicare  Transportation at Discharge: Family if she is moving well.   Post-acute (PAC) provider list was provided to patient. Patient was informed of their freedom to choose PAC provider. Discussed and offered to show the patient the relevant PAC Providers quality and resource use measures on Medicare Compare web site via computer based on patient's goals of care and treatment preferences. Questions regarding selection process were answered.      Teach Back Method used with Erin regarding care plan and discharge planning.   Patient verbalized understanding of the plan of care and contribute to goal setting.       Patient preferences and discharge plan: Patient is unsure if she feels comfortable returning home alone.  She had a grandchild in the room who volunteered to stay with her if needed.  Erin told AUBREY that she would discuss options with her family.  AUBREY will stop back tomorrow to further discuss plan.      Electronically signed by CLINT Jordan on 6/20/2024 at 2:58 PM

## 2024-06-20 NOTE — PLAN OF CARE
Problem: Respiratory - Adult  Goal: Clear lung sounds  Note: Patient receiving breathing treatments to maintain and manage respiratory status. Will be continued as ordered to improve aeration.

## 2024-06-20 NOTE — PROGRESS NOTES
[facility]  INPATIENT PHYSICAL THERAPY  EVALUATION  [facility] CCU-STEPDOWN 3B - 3B-25/025-A    Time In: 1431  Time Out: 1500  Timed Code Treatment Minutes: 10 Minutes  Minutes: 29          Date: 2024  Patient Name: Erin Hanna,  Gender:  female        MRN: [id number]  : 1945  (79 y.o.)      Referring Practitioner: Gifty Castle APRN - CNP  Diagnosis: STEMI  Additional Pertinent Hx: Per EMR: 89-year-old female with history of hypertension and CKD 3A presenting with worsening shortness of breath.  This been going on for the past few days.  Woke up very early this morning with severe shortness of breath.  Denied any chest pain/arm pain/jaw pain at that time.  Denied palpitations.  Denied nausea or vomiting.  Was brought to the ED, EKG showed inferolateral ST elevations, elevated troponin at 136.  No prior history of stents.  Interventional cardiology consulted, patient taken for emergent cath, PCI w/ SANTA x 3 to RCA.     Restrictions/Precautions:  Restrictions/Precautions: Fall Risk    Subjective:  Chart Reviewed: Yes  Patient assessed for rehabilitation services?: Yes  Family / Caregiver Present: Yes  Subjective: RN approved session, patient sitting up in bedside chair and agreeable to session. Pt on 1L 02.    General:  Overall Orientation Status: Within Functional Limits  Vision: Within Functional Limits  Hearing: Exceptions to WFL  Hearing Exceptions: Hard of hearing/hearing concerns       Pain: 2/10: lower right side.     Vitals:   Vitals:    24 1215   BP: 100/69   Pulse: 67   Resp: 16   Temp: 97.8 °F (36.6 °C)   SpO2: 92%     Social/Functional History:    Lives With: Alone  Type of Home: House ([place])  Home Layout: Two level (Daughter lives on 1st floor, patient prefers the 2nd story)  Home Access: Stairs to enter without rails  Entrance Stairs - Number of Steps: 3 JORDYN without handrails, but planning to install them. Full flight of steps to 2nd story with 1

## 2024-06-20 NOTE — PLAN OF CARE
Problem: Discharge Planning  Goal: Discharge to home or other facility with appropriate resources  6/19/2024 2224 by Emili López RN  Outcome: Progressing  Flowsheets (Taken 6/19/2024 2224)  Discharge to home or other facility with appropriate resources:   Identify barriers to discharge with patient and caregiver   Identify discharge learning needs (meds, wound care, etc)   Refer to discharge planning if patient needs post-hospital services based on physician order or complex needs related to functional status, cognitive ability or social support system   Arrange for needed discharge resources and transportation as appropriate     Problem: ABCDS Injury Assessment  Goal: Absence of physical injury  6/19/2024 2224 by Emili López RN  Outcome: Progressing  Flowsheets (Taken 6/19/2024 2224)  Absence of Physical Injury: Implement safety measures based on patient assessment  Note: Bed locked & in low position, call light in reach, side-rails up x2, bed/chair alarm utilized, non-slip socks on when ambulating, reminded patient to use call light to call for assistance.      Problem: Safety - Adult  Goal: Free from fall injury  6/19/2024 2224 by Emili López RN  Outcome: Progressing  Flowsheets (Taken 6/19/2024 2224)  Free From Fall Injury: Instruct family/caregiver on patient safety  Note: Bed locked & in low position, call light in reach, side-rails up x2, bed/chair alarm utilized, non-slip socks on when ambulating, reminded patient to use call light to call for assistance.      Problem: Cardiovascular - Adult  Goal: Maintains optimal cardiac output and hemodynamic stability  6/19/2024 2224 by Emili López RN  Outcome: Progressing  Flowsheets (Taken 6/19/2024 2224)  Maintains optimal cardiac output and hemodynamic stability:   Monitor blood pressure and heart rate   Monitor urine output and notify Licensed Independent Practitioner for values outside of normal range   Assess for signs of decreased cardiac  output  Note: Ongoing assessment & interventions provided throughout shift.  Patient on continuous telemetry monitoring, heart tones, vitals & pulses checked at least 3 times per shift & PRN. Vitals within acceptable limits.  Peripheral pulses palpable.      Problem: Cardiovascular - Adult  Goal: Absence of cardiac dysrhythmias or at baseline  6/19/2024 2224 by Emili López RN  Outcome: Progressing  Flowsheets (Taken 6/19/2024 2224)  Absence of cardiac dysrhythmias or at baseline:   Monitor cardiac rate and rhythm   Assess for signs of decreased cardiac output   Administer antiarrhythmia medication and electrolyte replacement as ordered  Note: Ongoing assessment & interventions provided throughout shift.  Patient on continuous telemetry monitoring, heart tones, vitals & pulses checked at least 3 times per shift & PRN. Vitals within acceptable limits.  Peripheral pulses palpable.      Problem: Skin/Tissue Integrity - Adult  Goal: Skin integrity remains intact  6/19/2024 2224 by Emili López, RN  Outcome: Progressing  Flowsheets (Taken 6/19/2024 2224)  Skin Integrity Remains Intact: Monitor for areas of redness and/or skin breakdown  Note: Ongoing assessment & interventions provided throughout shift.  Skin assessments provided.  Encouraging/assisting patient to turn as needed.      Problem: Pain  Goal: Verbalizes/displays adequate comfort level or baseline comfort level  6/19/2024 2224 by Emili López, RN  Outcome: Progressing  Flowsheets (Taken 6/19/2024 2224)  Verbalizes/displays adequate comfort level or baseline comfort level:   Encourage patient to monitor pain and request assistance   Administer analgesics based on type and severity of pain and evaluate response   Consider cultural and social influences on pain and pain management   Assess pain using appropriate pain scale   Implement non-pharmacological measures as appropriate and evaluate response  Note: Ongoing assessment & interventions provided

## 2024-06-20 NOTE — PROGRESS NOTES
Hospitalist Progress Note      Patient:  Erin Hanna    Unit/Bed:3B-25/025-A  YOB: 1945  MRN: [id number]   Acct: [id number]   PCP: Claudine Gutierrez APRN - CNP  Date of Admission: 6/17/2024    Assessment/Plan:  #STEMI .S/P PCI TO RCA.  -Presented to ED with shortness of breath.   - EKG showing inferolateral ST elevations.  Troponin 136. -Interventional cardiology consulted.  S/p PCI of RCA w/ SANTA overlapping x 3.   -Cath also showed 50% Ost to LAD stenosis, 40% left circumflex stenossi, 40% RPDA stenosis, and 95% stenosis of RPAV.  -Continue  DAPT with aspirin and Brilinta and high intensity statin,beta blocker.  -Cardiology following.    #Ischemic cardiomyopathy.  -ECHO done showed EF of 40-45%  -Euvolemic at this time.  -Will resume home benazepril.Continue with beta blocker.  -Cardiology following.  .  #Bronchitis:   - Pneumonia panel positive for  staph aureus.  -Will change antibiotics to Vancomycin.  -Chest Xray ordered as patient continue to report SOB.    #ANNA on CKD IIIa.  -Renal US negative for any acute findings.  -Resolved with iv hydration.    #Hypertension:   -Home medications benazepril and amlodipine.Held by ICU team.Will resume before discharge.    #Hypoalbuminemia: 3.2.  Likely in setting of poor general nutrition given age and body habitus.   - BMI 17.89.        Initial H and P:-    89-year-old female with history of hypertension and CKD 3A presenting with worsening shortness of breath.  This been going on for the past few days.  Woke up very early this morning with severe shortness of breath.  Denied any chest pain/arm pain/jaw pain at that time.  Denied palpitations.  Denied nausea or vomiting.  Was brought to the ED, EKG showed inferolateral ST elevations, elevated troponin at 136.  No prior history of stents.  Interventional cardiology consulted, patient taken for emergent cath, PCI w/ SANTA x 3 to RCA.

## 2024-06-20 NOTE — PROCEDURES
PROCEDURE NOTE  Date: 6/20/2024   Name: Erin Hanna  YOB: 1945    Procedures  Bladder scan completed at 0315 and results told to Luis Eduardo KLEIN. Scan showed 188 mL in the patients bladder. Last void was unknown.

## 2024-06-20 NOTE — PROGRESS NOTES
Comprehensive Nutrition Assessment    Type and Reason for Visit:  Initial, Consult (diet education, cardiac; RN requesting Ensure; no teeth, poor po)    Nutrition Recommendations/Plan:   Continue current diet - added ground meats to diet order per pt. Request.  Send Ensure Plus TID.  Encouraged po, good nutrition at best efforts.  Encouraged small, frequent meals.  Discussed appropriate use of ONS at discharge.  Encouraged adequate nutrition at top priority but did encourage pt. To limit added salt, fried foods, etc.     Malnutrition Assessment:  Malnutrition Status:  Severe malnutrition (06/20/24 1216)    Context:  Acute Illness     Findings of the 6 clinical characteristics of malnutrition:  Energy Intake:  50% or less of estimated energy requirements for 5 or more days  Weight Loss:   (reports ~5.3% in few months (u/a confirm))     Body Fat Loss:  Moderate body fat loss Orbital, Triceps, Fat Overlying Ribs, Buccal region   Muscle Mass Loss:  Moderate muscle mass loss Temples (temporalis), Clavicles (pectoralis & deltoids), Hand (interosseous)  Fluid Accumulation:  Unable to assess     Strength:  Not Performed    Nutrition Assessment:     Pt. severely malnourished AEB criteria listed above.  At risk for further nutritional compromise r/t admit s/p STEMI, PCI and underlying medical condition (hx HTN, CKD).       Nutrition Related Findings:      Wound Type: None     Pt. Report/Treatments/Miscellaneous:   6/20: pt. Seen - reports poor appetite and intake for the past few weeks; states has never been a big eater overall; reports usually drinks coffee for breakfast, has a sandwich for lunch and hot meal at dinner; admits to eating small portions; minimal po intake in hospital- breakfast tray essentially untouched; denies any N/V; reports some trouble with chewing as left her dentures at home; states daughter should be bringing them in;appears very thin emaciated; agrees to receive Ensure Plus; states has Ensure at  or Edema, Nutrition Focused Physical Findings, Skin, Weight    Discharge Planning:    Continue Oral Nutrition Supplement     Anita Farrell, JULIETH, LD  Contact: [phone]

## 2024-06-20 NOTE — PROGRESS NOTES
Cardiology Progress Note      Patient:  Erin Hanna  YOB: 1945  MRN: [id number]   Acct: [id number]  Admit Date:  6/17/2024  Primary Cardiologist:  none    Note per dr moreno \"CHIEF COMPLAINT: FERRARI        HPI: This is a pleasant 78 y.o. female with hx of HTN who presents with progressive dyspnea and sob.  Has been having severe sob for the last couple days.  Tonight, she awoke with sudden onset of sob.  No chest pain, arm pain, jaw pain, or palpitations.  Daughter brought her to the ED.  ECG show inferolateral ST-elevations but there is also concern for anterior elevations.  She has no f/c/ns.  No sick contacts.  No prior autoimmune diseases.  ED did POCUS, no obvious fluid, ED preserved.  Cr 1.2.  Hgb 13.  She is not having significant discomfort now, however ST elevation persists.     Subjective (Events in last 24 hours): pt awake and alert.  NAD. No cp.  Has some sob, cough with sputum production. Mild edema, improved since admission.  No fever or chills  On 1 l/min O2    Net I/o +447ml    Objective:   /65   Pulse 86   Temp 97.5 °F (36.4 °C) (Oral)   Resp 18   Ht 1.6 m (5' 3\")   Wt 43.8 kg (96 lb 9 oz)   SpO2 92%   BMI 17.11 kg/m²        TELEMETRY: PVC in bigeminy pattern    Physical Exam:  General Appearance: alert and oriented to person, place and time, in no acute distress  Cardiovascular: normal rate, regular rhythm, normal S1 and S2, no murmurs, rubs, clicks, or gallops, distal pulses intact, no carotid bruits, no JVD  Pulmonary/Chest: diminished throughout, no wheezing or crackles, few rhonchi  Abdomen: soft, non-tender, non-distended, normal bowel sounds, no masses Extremities: no cyanosis, clubbing or edema, pulse   Skin: warm and dry, no rash or erythema  Head: normocephalic and atraumatic  Eyes: pupils equal, round, and reactive to light  Neck: supple and non-tender without mass, no thyromegaly   Musculoskeletal: normal range of motion, no joint swelling, deformity or    Angioplasty   Angioplasty was performed following stent deployment.   Supplies used: [employer] EUPHORA 4X20mm Balloon   Post-Intervention Lesion Assessment   The intervention was successful. The guidewire crossed the lesion. Device was deployed. The pre-interventional distal flow is decreased (MINA 1). Post-intervention MINA flow is 3. There were no complications.   There is a 0% residual stenosis post intervention.        Left Heart    Left Ventricle The left ventricular size is normal. There is mild left ventricular systolic dysfunction. LV systolic pressure is normal. The estimated EF = 45 - 50%.     Coronary Diagram    Diagnostic  Dominance: Right    Intervention      Wall Scoring    Wall Motion              TTE 6/18/24    Left Ventricle: Mildly reduced left ventricular systolic function with a visually estimated EF of 40 - 45%. Left ventricle size is normal. Normal wall thickness. Mild global hypokinesis present. Grade I diastolic dysfunction with normal LAP.    Aortic Valve: Trileaflet valve. Moderately thickened cusp. Moderately calcified cusp.    Mitral Valve: Moderately thickened leaflet, at the posterior leaflet. Moderately calcified leaflet, at the posterior leaflet.    Tricuspid Valve: Mild regurgitation. The estimated RVSP is 61 mmHg. The estimated RVSP is 61 mmHg.    Left Atrium: Left atrium is severely dilated.    Right Atrium: Right atrium is moderately dilated.    IVC/SVC: IVC diameter is greater than 21 mm and decreases less than 50% during inspiration; therefore the estimated right atrial pressure is elevated (~15 mmHg). IVC is dilated.    Image quality is technically difficult. Technically difficult study due to patient's heart rhythm and procedure performed with the patient in a sitting position.    Lab Data:    Cardiac Enzymes:  No results for input(s): \"CKTOTAL\", \"CKMB\", \"CKMBINDEX\", \"TROPONINI\" in the last 72 hours.    CBC:   Lab Results   Component Value Date/Time    WBC 7.3 06/20/2024

## 2024-06-20 NOTE — PLAN OF CARE
Problem: Discharge Planning  Goal: Discharge to home or other facility with appropriate resources  6/20/2024 1105 by Xenia Vieyra RN  Outcome: Progressing  Flowsheets (Taken 6/20/2024 1105)  Discharge to home or other facility with appropriate resources:   Identify barriers to discharge with patient and caregiver   Identify discharge learning needs (meds, wound care, etc)   Refer to discharge planning if patient needs post-hospital services based on physician order or complex needs related to functional status, cognitive ability or social support system   Arrange for needed discharge resources and transportation as appropriate   Arrange for interpreters to assist at discharge as needed     Problem: ABCDS Injury Assessment  Goal: Absence of physical injury  6/20/2024 1105 by Xenia Vieyra RN  Outcome: Progressing  Flowsheets (Taken 6/20/2024 1105)  Absence of Physical Injury: Implement safety measures based on patient assessment     Problem: Safety - Adult  Goal: Free from fall injury  6/20/2024 1105 by Xenia Vieyra RN  Outcome: Progressing  Flowsheets (Taken 6/20/2024 1105)  Free From Fall Injury: Instruct family/caregiver on patient safety     Problem: Cardiovascular - Adult  Goal: Maintains optimal cardiac output and hemodynamic stability  6/20/2024 1105 by Xenia Vieyra RN  Outcome: Progressing  Flowsheets (Taken 6/20/2024 1105)  Maintains optimal cardiac output and hemodynamic stability:   Monitor blood pressure and heart rate   Monitor urine output and notify Licensed Independent Practitioner for values outside of normal range   Assess for signs of decreased cardiac output     Problem: Cardiovascular - Adult  Goal: Absence of cardiac dysrhythmias or at baseline  6/20/2024 1105 by Xenia Vieyra RN  Outcome: Progressing  Flowsheets (Taken 6/20/2024 1105)  Absence of cardiac dysrhythmias or at baseline:   Monitor cardiac rate and rhythm   Assess for signs of decreased cardiac output   Administer  antiarrhythmia medication and electrolyte replacement as ordered     Problem: Skin/Tissue Integrity - Adult  Goal: Skin integrity remains intact  6/20/2024 1105 by Xenia Vieyra RN  Outcome: Progressing  Flowsheets (Taken 6/20/2024 1105)  Skin Integrity Remains Intact: Monitor for areas of redness and/or skin breakdown     Problem: Pain  Goal: Verbalizes/displays adequate comfort level or baseline comfort level  6/20/2024 1105 by Xenia Vieyra RN  Outcome: Progressing  Flowsheets (Taken 6/20/2024 1105)  Verbalizes/displays adequate comfort level or baseline comfort level:   Encourage patient to monitor pain and request assistance   Administer analgesics based on type and severity of pain and evaluate response   Consider cultural and social influences on pain and pain management   Assess pain using appropriate pain scale   Implement non-pharmacological measures as appropriate and evaluate response   Notify Licensed Independent Practitioner if interventions unsuccessful or patient reports new pain     Problem: Skin/Tissue Integrity  Goal: Absence of new skin breakdown  Description: 1.  Monitor for areas of redness and/or skin breakdown  2.  Assess vascular access sites hourly  3.  Every 4-6 hours minimum:  Change oxygen saturation probe site  4.  Every 4-6 hours:  If on nasal continuous positive airway pressure, respiratory therapy assess nares and determine need for appliance change or resting period.  6/20/2024 1105 by Xenia Vieyra RN  Outcome: Progressing  Note: Assist patient to turn Q2 and PRN if patient unable to reposition independently.     Problem: Chronic Conditions and Co-morbidities  Goal: Patient's chronic conditions and co-morbidity symptoms are monitored and maintained or improved  6/20/2024 1105 by Xenia Vieyra RN  Outcome: Progressing  Flowsheets (Taken 6/20/2024 1105)  Care Plan - Patient's Chronic Conditions and Co-Morbidity Symptoms are Monitored and Maintained or Improved:   Monitor and

## 2024-06-21 ENCOUNTER — APPOINTMENT (OUTPATIENT)
Dept: CT IMAGING | Age: 79
End: 2024-06-21
Payer: MEDICARE

## 2024-06-21 LAB
ANION GAP SERPL CALC-SCNC: 12 MEQ/L (ref 8–16)
BUN SERPL-MCNC: 14 MG/DL (ref 7–22)
CALCIUM SERPL-MCNC: 9.4 MG/DL (ref 8.5–10.5)
CHLORIDE SERPL-SCNC: 96 MEQ/L (ref 98–111)
CO2 SERPL-SCNC: 23 MEQ/L (ref 23–33)
CREAT SERPL-MCNC: 0.8 MG/DL (ref 0.4–1.2)
EKG ATRIAL RATE: 102 BPM
EKG P AXIS: 76 DEGREES
EKG P-R INTERVAL: 126 MS
EKG Q-T INTERVAL: 344 MS
EKG QRS DURATION: 84 MS
EKG QTC CALCULATION (BAZETT): 448 MS
EKG R AXIS: -16 DEGREES
EKG T AXIS: 76 DEGREES
EKG VENTRICULAR RATE: 102 BPM
GFR SERPL CREATININE-BSD FRML MDRD: 75 ML/MIN/1.73M2
GLUCOSE SERPL-MCNC: 115 MG/DL (ref 70–108)
MAGNESIUM SERPL-MCNC: 1.9 MG/DL (ref 1.6–2.4)
POTASSIUM SERPL-SCNC: 4 MEQ/L (ref 3.5–5.2)
SODIUM SERPL-SCNC: 131 MEQ/L (ref 135–145)

## 2024-06-21 PROCEDURE — 71250 CT THORAX DX C-: CPT

## 2024-06-21 PROCEDURE — 2700000000 HC OXYGEN THERAPY PER DAY

## 2024-06-21 PROCEDURE — 6370000000 HC RX 637 (ALT 250 FOR IP): Performed by: INTERNAL MEDICINE

## 2024-06-21 PROCEDURE — 94761 N-INVAS EAR/PLS OXIMETRY MLT: CPT

## 2024-06-21 PROCEDURE — 6370000000 HC RX 637 (ALT 250 FOR IP): Performed by: PHYSICIAN ASSISTANT

## 2024-06-21 PROCEDURE — 99232 SBSQ HOSP IP/OBS MODERATE 35: CPT | Performed by: INTERNAL MEDICINE

## 2024-06-21 PROCEDURE — 97116 GAIT TRAINING THERAPY: CPT

## 2024-06-21 PROCEDURE — 6370000000 HC RX 637 (ALT 250 FOR IP)

## 2024-06-21 PROCEDURE — 94640 AIRWAY INHALATION TREATMENT: CPT

## 2024-06-21 PROCEDURE — 2580000003 HC RX 258: Performed by: INTERNAL MEDICINE

## 2024-06-21 PROCEDURE — 97530 THERAPEUTIC ACTIVITIES: CPT

## 2024-06-21 PROCEDURE — 93010 ELECTROCARDIOGRAM REPORT: CPT | Performed by: INTERNAL MEDICINE

## 2024-06-21 PROCEDURE — 6360000002 HC RX W HCPCS: Performed by: INTERNAL MEDICINE

## 2024-06-21 PROCEDURE — 36415 COLL VENOUS BLD VENIPUNCTURE: CPT

## 2024-06-21 PROCEDURE — 93005 ELECTROCARDIOGRAM TRACING: CPT | Performed by: INTERNAL MEDICINE

## 2024-06-21 PROCEDURE — 6360000002 HC RX W HCPCS

## 2024-06-21 PROCEDURE — 2140000000 HC CCU INTERMEDIATE R&B

## 2024-06-21 PROCEDURE — 80048 BASIC METABOLIC PNL TOTAL CA: CPT

## 2024-06-21 PROCEDURE — 83735 ASSAY OF MAGNESIUM: CPT

## 2024-06-21 RX ADMIN — ASPIRIN 81 MG 81 MG: 81 TABLET ORAL at 07:58

## 2024-06-21 RX ADMIN — TICAGRELOR 90 MG: 90 TABLET ORAL at 07:58

## 2024-06-21 RX ADMIN — METOPROLOL SUCCINATE 25 MG: 25 TABLET, FILM COATED, EXTENDED RELEASE ORAL at 07:59

## 2024-06-21 RX ADMIN — ATORVASTATIN CALCIUM 40 MG: 40 TABLET, FILM COATED ORAL at 22:34

## 2024-06-21 RX ADMIN — ACETAMINOPHEN 650 MG: 325 TABLET ORAL at 07:58

## 2024-06-21 RX ADMIN — AMOXICILLIN AND CLAVULANATE POTASSIUM 1 TABLET: 875; 125 TABLET, FILM COATED ORAL at 07:59

## 2024-06-21 RX ADMIN — SODIUM CHLORIDE, PRESERVATIVE FREE 10 ML: 5 INJECTION INTRAVENOUS at 07:58

## 2024-06-21 RX ADMIN — IPRATROPIUM BROMIDE 0.5 MG: 0.5 SOLUTION RESPIRATORY (INHALATION) at 22:23

## 2024-06-21 RX ADMIN — PIPERACILLIN AND TAZOBACTAM 4500 MG: 4; .5 INJECTION, POWDER, LYOPHILIZED, FOR SOLUTION INTRAVENOUS at 11:00

## 2024-06-21 RX ADMIN — TICAGRELOR 90 MG: 90 TABLET ORAL at 21:36

## 2024-06-21 RX ADMIN — PIPERACILLIN AND TAZOBACTAM 3375 MG: 3; .375 INJECTION, POWDER, FOR SOLUTION INTRAVENOUS at 16:00

## 2024-06-21 RX ADMIN — IPRATROPIUM BROMIDE 0.5 MG: 0.5 SOLUTION RESPIRATORY (INHALATION) at 09:28

## 2024-06-21 RX ADMIN — SODIUM CHLORIDE, PRESERVATIVE FREE 10 ML: 5 INJECTION INTRAVENOUS at 21:36

## 2024-06-21 ASSESSMENT — PAIN DESCRIPTION - FREQUENCY: FREQUENCY: INTERMITTENT

## 2024-06-21 ASSESSMENT — PAIN DESCRIPTION - LOCATION: LOCATION: GENERALIZED

## 2024-06-21 ASSESSMENT — PAIN DESCRIPTION - DESCRIPTORS: DESCRIPTORS: ACHING;DISCOMFORT

## 2024-06-21 ASSESSMENT — PAIN DESCRIPTION - ONSET: ONSET: GRADUAL

## 2024-06-21 ASSESSMENT — PAIN - FUNCTIONAL ASSESSMENT: PAIN_FUNCTIONAL_ASSESSMENT: ACTIVITIES ARE NOT PREVENTED

## 2024-06-21 ASSESSMENT — PAIN SCALES - GENERAL: PAINLEVEL_OUTOF10: 5

## 2024-06-21 ASSESSMENT — PAIN DESCRIPTION - PAIN TYPE: TYPE: ACUTE PAIN

## 2024-06-21 NOTE — PROGRESS NOTES
Pharmacy Note - Extended Infusion Beta-Lactam Dose Adjustment    Piperacillin/Tazobactam 3375 mg q6h intermittent infusion ordered for the treatment of Hospital acquired pneumonia. Per [facility] Extended Infusion Beta-Lactam Policy, this will be changed to 4500 mg loading dose followed by 3375 mg q8h extended infusion     Estimated Creatinine Clearance: Estimated Creatinine Clearance: 39 mL/min (based on SCr of 0.8 mg/dL).    Dialysis Status, ANNA, CKD: CKD    BMI: Body mass index is 16.69 kg/m².    Rationale for Adjustment: Dose adjusted per [facility] Extended Infusion Policy based on renal function and indication. The above medication is renally eliminated and demonstrates time-dependent effects on bacterial eradication. Extended-infusion dosing strategy aims to enhance microbiologic and clinical efficacy.     Pharmacy will monitor renal function daily and adjust dose as necessary.      Please call with any questions.    Thank you,  Pavithra Peterson, PharmD  6/21/2024  8:27 AM

## 2024-06-21 NOTE — PROGRESS NOTES
Physician Progress Note      PATIENT:               LILY JACOBS  CSN #:                  [id number]  :                       1945  ADMIT DATE:       2024 4:37 AM  DISCH DATE:  RESPONDING  PROVIDER #:        Heidi Boyd MD          QUERY TEXT:    Patient admitted with STEMI . Noted to have Severe Malnutrition documented in   Clinical Dietician Evaluation Note . If possible, please make selection   below, document in progress notes and discharge summary if you are evaluating   and /or treating any of the following:    The medical record reflects the following:  Risk Factors: Acute on chronic illness with catabolic effect and risk for   further nutritional compromise related to admit s/p STEMI, PCI and underlying   medical conditions (hx HTN, CHF CKD)  Clinical Indicators: per AND/ASPEN CRITERIA AEB 50% or less of estimated   energy requirements for 5 or more days, Weight Loss-(reports   5.3% in few months (u/a confirm) ,  Moderate body fat loss Orbital, Triceps,   Fat Overlying Ribs, Buccal region, Moderate muscle mass loss Temples   (temporalis), Clavicles (pectoralis & deltoids), Hand (interosseous)  Treatment: Continued inpatient monitoring of Diet Advancement/Tolerance, Food   and Nutrient Intake, Supplement Intake, Biochemical Data, Chewing or   Swallowing, GI Status, Fluid Status or Edema, Nutrition Focused Physical   Findings, Skin, Weight with post discharge follow up as recommended      Thank You,  Attila HOOKSN, RN, CRCR  Clinical   P: [phone]  F: [phone]  Options provided:  -- Protein calorie malnutrition severe  -- Other - I will add my own diagnosis  -- Disagree - Not applicable / Not valid  -- Disagree - Clinically unable to determine / Unknown  -- Refer to Clinical Documentation Reviewer    PROVIDER RESPONSE TEXT:    This patient has severe protein calorie malnutrition.    Query created by: Attila Rivera on 2024 2:54

## 2024-06-21 NOTE — RT PROTOCOL NOTE
RT Inhaler-Nebulizer Bronchodilator Protocol Note    There is a bronchodilator order in the chart from a provider indicating to follow the RT Bronchodilator Protocol and there is an “Initiate RT Inhaler-Nebulizer Bronchodilator Protocol” order as well (see protocol at bottom of note).    CXR Findings:  No results found.    The findings from the last RT Protocol Assessment were as follows:   History Pulmonary Disease: Chronic pulmonary disease  Respiratory Pattern: Regular pattern and RR 12-20 bpm  Breath Sounds: Slightly diminished and/or crackles  Cough: Strong, spontaneous, non-productive  Indication for Bronchodilator Therapy: Decreased or absent breath sounds  Bronchodilator Assessment Score: 4    Aerosolized bronchodilator medication orders have been revised according to the RT Inhaler-Nebulizer Bronchodilator Protocol below.    Respiratory Therapist to perform RT Therapy Protocol Assessment initially then follow the protocol.  Repeat RT Therapy Protocol Assessment PRN for score 0-3 or on second treatment, BID, and PRN for scores above 3.    No Indications - adjust the frequency to every 6 hours PRN wheezing or bronchospasm, if no treatments needed after 48 hours then discontinue using Per Protocol order mode.     If indication present, adjust the RT bronchodilator orders based on the Bronchodilator Assessment Score as indicated below.  Use Inhaler orders unless patient has one or more of the following: on home nebulizer, not able to hold breath for 10 seconds, is not alert and oriented, cannot activate and use MDI correctly, or respiratory rate 25 breaths per minute or more, then use the equivalent nebulizer order(s) with same Frequency and PRN reasons based on the score.  If a patient is on this medication at home then do not decrease Frequency below that used at home.    0-3 - enter or revise RT bronchodilator order(s) to equivalent RT Bronchodilator order with Frequency of every 4 hours PRN for wheezing or  increased work of breathing using Per Protocol order mode.        4-6 - enter or revise RT Bronchodilator order(s) to two equivalent RT bronchodilator orders with one order with BID Frequency and one order with Frequency of every 4 hours PRN wheezing or increased work of breathing using Per Protocol order mode.        7-10 - enter or revise RT Bronchodilator order(s) to two equivalent RT bronchodilator orders with one order with TID Frequency and one order with Frequency of every 4 hours PRN wheezing or increased work of breathing using Per Protocol order mode.       11-13 - enter or revise RT Bronchodilator order(s) to one equivalent RT bronchodilator order with QID Frequency and an Albuterol order with Frequency of every 4 hours PRN wheezing or increased work of breathing using Per Protocol order mode.      Greater than 13 - enter or revise RT Bronchodilator order(s) to one equivalent RT bronchodilator order with every 4 hours Frequency and an Albuterol order with Frequency of every 2 hours PRN wheezing or increased work of breathing using Per Protocol order mode.     RT to enter RT Home Evaluation for COPD & MDI Assessment order using Per Protocol order mode.    Electronically signed by Brayan Hung RCP on 6/21/2024 at 9:33 AM

## 2024-06-21 NOTE — PROGRESS NOTES
Cardiology Progress Note      Patient:  Erin Hanna  YOB: 1945  MRN: [id number]   Acct: [id number]  Admit Date:  6/17/2024  Primary Cardiologist: none  Seen by Dr. Fuller     Per prior cardiology consult note-  CHIEF COMPLAINT: FERRARI        HPI: This is a pleasant 78 y.o. female with hx of HTN who presents with progressive dyspnea and sob.  Has been having severe sob for the last couple days.  Tonight, she awoke with sudden onset of sob.  No chest pain, arm pain, jaw pain, or palpitations.  Daughter brought her to the ED.  ECG show inferolateral ST-elevations but there is also concern for anterior elevations.  She has no f/c/ns.  No sick contacts.  No prior autoimmune diseases.  ED did POCUS, no obvious fluid, ED preserved.  Cr 1.2.  Hgb 13.  She is not having significant discomfort now, however ST elevation persists.        Subjective (Events in last 24 hours):   Pt up in chair   No chest pain - does still co SOB - per pt never had this before   On 1L O2  Bp stable     Overnight had episode of tachycardia (SVT)  after Atrovent treatment last rahel around 2130    Tele SR occ PAC noted today   Hasn't received BB today         6/21/2024  Pt up in chair   No chest pain orSOB co   Remains on 02  CT chest seen     BP stable - low --> asymptomatic     Objective:   /72   Pulse 64   Temp 97.4 °F (36.3 °C) (Oral)   Resp 18   Ht 1.6 m (5' 3\")   Wt 42.7 kg (94 lb 3.2 oz)   SpO2 100%   BMI 16.69 kg/m²        TELEMETRY: SR occ PAC     Physical Exam:  General Appearance: alert and oriented to person, place and time, in no acute distress  Cardiovascular: normal rate, regular rhythm, normal S1 and S2, no murmurs, rubs, clicks, or gallops, distal pulses intact,   Pulmonary/Chest: clear upper - diminished air exchange posterior to auscultation bilaterally- no wheezes, rales or rhonchi, normal air movement, no respiratory distress  Abdomen: soft, non-tender, non-distended, normal bowel sounds, no masses  Inhibitor: yes  Beta Blocker: yes  ACE / ARB: no  low BP   Nitro SL: yes      Discharge Medications for ICD, Cardiomyopathy, CHF:  Beta Blocker: yes  ACE Inhibitor/ARB: not at present - low BP         Electronically signed by JARROD Ferreira CNP on 6/21/2024 at 12:26 PM

## 2024-06-21 NOTE — PLAN OF CARE
Problem: Discharge Planning  Goal: Discharge to home or other facility with appropriate resources  Outcome: Progressing  Marco (Taken 6/20/2024 2119 by Emili López, RN)  Discharge to home or other facility with appropriate resources:   Identify discharge learning needs (meds, wound care, etc)   Identify barriers to discharge with patient and caregiver   Refer to discharge planning if patient needs post-hospital services based on physician order or complex needs related to functional status, cognitive ability or social support system   Arrange for needed discharge resources and transportation as appropriate     Problem: ABCDS Injury Assessment  Goal: Absence of physical injury  Outcome: Progressing  Flowsheets (Taken 6/20/2024 2119 by Emili López, RN)  Absence of Physical Injury: Implement safety measures based on patient assessment     Problem: Safety - Adult  Goal: Free from fall injury  Outcome: Progressing  Flowsheets (Taken 6/20/2024 2119 by Emili López RN)  Free From Fall Injury: Instruct family/caregiver on patient safety     Problem: Cardiovascular - Adult  Goal: Maintains optimal cardiac output and hemodynamic stability  Outcome: Progressing  Flowsheets (Taken 6/20/2024 2119 by Emili López RN)  Maintains optimal cardiac output and hemodynamic stability:   Monitor blood pressure and heart rate   Monitor urine output and notify Licensed Independent Practitioner for values outside of normal range   Assess for signs of decreased cardiac output  Goal: Absence of cardiac dysrhythmias or at baseline  Outcome: Progressing  Flowsheets (Taken 6/20/2024 2119 by Emili López, RN)  Absence of cardiac dysrhythmias or at baseline:   Monitor cardiac rate and rhythm   Assess for signs of decreased cardiac output   Administer antiarrhythmia medication and electrolyte replacement as ordered     Problem: Skin/Tissue Integrity - Adult  Goal: Skin integrity remains intact  Outcome: Progressing  Flowsheets

## 2024-06-21 NOTE — PROCEDURES
PROCEDURE NOTE  Date: 6/21/2024   Name: Erin Hanna  YOB: 1945    Procedures  12 lead EKG completed. Results handed to Emili KLEIN.

## 2024-06-21 NOTE — PROGRESS NOTES
dilated.   Image quality is technically difficult. Technically difficult study due to patient's heart rhythm and procedure performed with the patient in a sitting position.     XR CHEST PORTABLE    Result Date: 6/18/2024  PROCEDURE: XR CHEST PORTABLE CLINICAL INFORMATION: SOB COMPARISON: 6/17/2024 TECHNIQUE: A single mobile view of the chest was obtained.     1. Borderline heart size. Soft tissue fullness projected over the right side of the cardiac silhouette, probably due to a moderate size hiatus hernia. 2. No acute infiltrates or effusions are seen. Prior cholecystectomy. **This report has been created using voice recognition software.  It may contain minor errors which are inherent in voice recognition technology.** Electronically signed by Dr. Johnny Gutierres    XR CHEST PORTABLE    Result Date: 6/17/2024  PROCEDURE: XR CHEST PORTABLE CLINICAL INFORMATION: SOB, LE edema. COMPARISON: 12/12/2008 TECHNIQUE: A single mobile view of the chest was obtained.     1. Borderline heart size. No effusion. Apparent prior cholecystectomy. 2. Abnormal increased density right cardiophrenic sulcus. Possibilities include pneumonia versus mass lesion. 3. CT thorax recommended for further evaluation. **This report has been created using voice recognition software.  It may contain minor errors which are inherent in voice recognition technology.** Electronically signed by Dr. Johnny Gutierres    US RENAL COMPLETE    Result Date: 6/17/2024  PROCEDURE: US RENAL COMPLETE CLINICAL INFORMATION: Acute kidney injury TECHNIQUE: Ultrasound of the kidneys and urinary bladder was performed. Grayscale and color images were obtained. COMPARISON: Renal ultrasound 9/22/2021 FINDINGS: The right kidney measures 10.0 x 4.2 x 5.0 cm and the left kidney measures 5.5 x 4.0 x 3.3 cm. There is cortical thinning on the left side. There is no hydronephrosis, calculi or intrarenal mass. Color Doppler demonstrates expected waveforms in the bilateral renal  arteries. Arcuate resistive indices are mildly elevated at 0.8 bilaterally. There is a catheter in the urinary bladder.     1. Left renal cortical thinning and atrophy. 2. Mildly elevated bilateral arcuate resistive indices. Electronically signed by Dr. Tyrese Sarah    Cardiac procedure    Result Date: 6/17/2024    Successful PCI of RCA STEMI using OCT guidance followed by 3 overlapping SANTA       Electronically signed by Heidi Thomas MD on 6/21/2024 at 11:30 AM

## 2024-06-21 NOTE — CARE COORDINATION
6/21/24, 1:32 PM EDT    DISCHARGE PLANNING EVALUATION    Spoke with Erin this morning regarding discharge plan. She would like to go to an ECF for rehab at discharge.  Went over the ECF list with her.  She would like to speak with her family before making a decision.      Update 3:10 pm: Spoke with Erin and her grandchild Ana Tran [phone].  They told SW that their first choice is [facility].  Called and made a referral to Lisa in admissions at the facility.  Their next choices are [facility] and [facility].

## 2024-06-21 NOTE — PROGRESS NOTES
[facility]  INPATIENT PHYSICAL THERAPY  DAILY NOTE  STRZ CCU-STEPDOWN 3B - 3B-25/025-A    Time In: 1108  Time Out: 1133  Timed Code Treatment Minutes: 25 Minutes  Minutes: 25          Date: 2024  Patient Name: Erin Hanna,  Gender:  female        MRN: [id number]  : 1945  (79 y.o.)     Referring Practitioner: Gifty Castle APRN - CNP  Diagnosis: STEMI  Additional Pertinent Hx: Per EMR: 89-year-old female with history of hypertension and CKD 3A presenting with worsening shortness of breath.  This been going on for the past few days.  Woke up very early this morning with severe shortness of breath.  Denied any chest pain/arm pain/jaw pain at that time.  Denied palpitations.  Denied nausea or vomiting.  Was brought to the ED, EKG showed inferolateral ST elevations, elevated troponin at 136.  No prior history of stents.  Interventional cardiology consulted, patient taken for emergent cath, PCI w/ SANTA x 3 to RCA.     Prior Level of Function:  Lives With: Alone  Type of Home: House (Duplex)  Home Layout: Two level (Daughter lives on 1st floor, patient prefers the 2nd story)  Home Access: Stairs to enter without rails  Entrance Stairs - Number of Steps: 3 JORDYN without handrails, but planning to install them. Full flight of steps to 2nd story with 1 handrail.  Home Equipment: None   Bathroom Shower/Tub: Tub/Shower unit  Bathroom Toilet: Standard  Bathroom Equipment: None    ADL Assistance: Independent  Homemaking Assistance: Independent  Homemaking Responsibilities: Yes (Takes care of her own housekeeping needs.)  Ambulation Assistance: Independent  Transfer Assistance: Independent  Active : Yes  Additional Comments: Patient reporting she was IND PTA without use of an AD, was not on oxygen PTA. Patient states her daughter works during the day but has granddtr here from [place] and thinking about staying for the summer to assist if

## 2024-06-21 NOTE — PLAN OF CARE
Problem: Discharge Planning  Goal: Discharge to home or other facility with appropriate resources  6/20/2024 2119 by Emili López RN  Outcome: Progressing  Flowsheets (Taken 6/20/2024 2119)  Discharge to home or other facility with appropriate resources:   Identify discharge learning needs (meds, wound care, etc)   Identify barriers to discharge with patient and caregiver   Refer to discharge planning if patient needs post-hospital services based on physician order or complex needs related to functional status, cognitive ability or social support system   Arrange for needed discharge resources and transportation as appropriate     Problem: ABCDS Injury Assessment  Goal: Absence of physical injury  6/20/2024 2119 by Emili López RN  Outcome: Progressing  Flowsheets (Taken 6/20/2024 2119)  Absence of Physical Injury: Implement safety measures based on patient assessment  Note: Bed locked & in low position, call light in reach, side-rails up x2, bed/chair alarm utilized, non-slip socks on when ambulating, reminded patient to use call light to call for assistance.      Problem: Safety - Adult  Goal: Free from fall injury  6/20/2024 2119 by Emili López RN  Outcome: Progressing  Flowsheets (Taken 6/20/2024 2119)  Free From Fall Injury: Instruct family/caregiver on patient safety  Note: Bed locked & in low position, call light in reach, side-rails up x2, bed/chair alarm utilized, non-slip socks on when ambulating, reminded patient to use call light to call for assistance.      Problem: Cardiovascular - Adult  Goal: Maintains optimal cardiac output and hemodynamic stability  6/20/2024 2119 by Emili López RN  Outcome: Progressing  Flowsheets (Taken 6/20/2024 2119)  Maintains optimal cardiac output and hemodynamic stability:   Monitor blood pressure and heart rate   Monitor urine output and notify Licensed Independent Practitioner for values outside of normal range   Assess for signs of decreased cardiac  assessment & interventions provided throughout shift.  Reminded patient to report any pain, pressure, or shortness of breath to the nurse.  Pain medications provided per physician's orders.      Problem: Skin/Tissue Integrity  Goal: Absence of new skin breakdown  Description: 1.  Monitor for areas of redness and/or skin breakdown  2.  Assess vascular access sites hourly  3.  Every 4-6 hours minimum:  Change oxygen saturation probe site  4.  Every 4-6 hours:  If on nasal continuous positive airway pressure, respiratory therapy assess nares and determine need for appliance change or resting period.  6/20/2024 2119 by Emili López RN  Outcome: Progressing  Note: Ongoing assessment & interventions provided throughout shift.  Skin assessments provided.  Encouraging/assisting patient to turn as needed.      Problem: Chronic Conditions and Co-morbidities  Goal: Patient's chronic conditions and co-morbidity symptoms are monitored and maintained or improved  6/20/2024 2119 by Emili López RN  Outcome: Progressing  Flowsheets (Taken 6/20/2024 2119)  Care Plan - Patient's Chronic Conditions and Co-Morbidity Symptoms are Monitored and Maintained or Improved:   Monitor and assess patient's chronic conditions and comorbid symptoms for stability, deterioration, or improvement   Collaborate with multidisciplinary team to address chronic and comorbid conditions and prevent exacerbation or deterioration   Update acute care plan with appropriate goals if chronic or comorbid symptoms are exacerbated and prevent overall improvement and discharge     Problem: Nutrition Deficit:  Goal: Optimize nutritional status  6/20/2024 2119 by Emili López RN  Outcome: Progressing  Flowsheets (Taken 6/20/2024 2119)  Nutrient intake appropriate for improving, restoring, or maintaining nutritional needs:   Assess nutritional status and recommend course of action   Monitor oral intake, labs, and treatment plans   Recommend appropriate diets,

## 2024-06-22 PROBLEM — R91.8 RIGHT LOWER LOBE LUNG MASS: Status: ACTIVE | Noted: 2024-06-22

## 2024-06-22 PROCEDURE — 2580000003 HC RX 258: Performed by: INTERNAL MEDICINE

## 2024-06-22 PROCEDURE — 6360000002 HC RX W HCPCS

## 2024-06-22 PROCEDURE — 2140000000 HC CCU INTERMEDIATE R&B

## 2024-06-22 PROCEDURE — 6370000000 HC RX 637 (ALT 250 FOR IP)

## 2024-06-22 PROCEDURE — 99232 SBSQ HOSP IP/OBS MODERATE 35: CPT | Performed by: INTERNAL MEDICINE

## 2024-06-22 PROCEDURE — 6360000002 HC RX W HCPCS: Performed by: INTERNAL MEDICINE

## 2024-06-22 PROCEDURE — 6370000000 HC RX 637 (ALT 250 FOR IP): Performed by: INTERNAL MEDICINE

## 2024-06-22 PROCEDURE — 2700000000 HC OXYGEN THERAPY PER DAY

## 2024-06-22 PROCEDURE — 94640 AIRWAY INHALATION TREATMENT: CPT

## 2024-06-22 PROCEDURE — 94761 N-INVAS EAR/PLS OXIMETRY MLT: CPT

## 2024-06-22 PROCEDURE — 99223 1ST HOSP IP/OBS HIGH 75: CPT | Performed by: NURSE PRACTITIONER

## 2024-06-22 PROCEDURE — 6370000000 HC RX 637 (ALT 250 FOR IP): Performed by: PHYSICIAN ASSISTANT

## 2024-06-22 RX ORDER — ALBUTEROL SULFATE 90 UG/1
2 AEROSOL, METERED RESPIRATORY (INHALATION) EVERY 4 HOURS PRN
Status: DISCONTINUED | OUTPATIENT
Start: 2024-06-22 | End: 2024-06-25

## 2024-06-22 RX ADMIN — METOPROLOL SUCCINATE 25 MG: 25 TABLET, FILM COATED, EXTENDED RELEASE ORAL at 08:21

## 2024-06-22 RX ADMIN — TICAGRELOR 90 MG: 90 TABLET ORAL at 08:16

## 2024-06-22 RX ADMIN — IPRATROPIUM BROMIDE 0.5 MG: 0.5 SOLUTION RESPIRATORY (INHALATION) at 08:04

## 2024-06-22 RX ADMIN — PIPERACILLIN AND TAZOBACTAM 3375 MG: 3; .375 INJECTION, POWDER, FOR SOLUTION INTRAVENOUS at 17:03

## 2024-06-22 RX ADMIN — PIPERACILLIN AND TAZOBACTAM 3375 MG: 3; .375 INJECTION, POWDER, FOR SOLUTION INTRAVENOUS at 00:21

## 2024-06-22 RX ADMIN — PIPERACILLIN AND TAZOBACTAM 3375 MG: 3; .375 INJECTION, POWDER, FOR SOLUTION INTRAVENOUS at 08:16

## 2024-06-22 RX ADMIN — TICAGRELOR 90 MG: 90 TABLET ORAL at 19:38

## 2024-06-22 RX ADMIN — ATORVASTATIN CALCIUM 40 MG: 40 TABLET, FILM COATED ORAL at 19:38

## 2024-06-22 RX ADMIN — ASPIRIN 81 MG 81 MG: 81 TABLET ORAL at 08:20

## 2024-06-22 RX ADMIN — SODIUM CHLORIDE, PRESERVATIVE FREE 10 ML: 5 INJECTION INTRAVENOUS at 08:21

## 2024-06-22 RX ADMIN — ACETAMINOPHEN 650 MG: 325 TABLET ORAL at 17:01

## 2024-06-22 ASSESSMENT — PAIN DESCRIPTION - LOCATION: LOCATION: ABDOMEN

## 2024-06-22 ASSESSMENT — PAIN DESCRIPTION - DESCRIPTORS: DESCRIPTORS: CRAMPING

## 2024-06-22 ASSESSMENT — PAIN SCALES - GENERAL: PAINLEVEL_OUTOF10: 5

## 2024-06-22 ASSESSMENT — PAIN DESCRIPTION - ORIENTATION: ORIENTATION: MID

## 2024-06-22 NOTE — PLAN OF CARE
Problem: Respiratory - Adult  Goal: Clear lung sounds  Outcome: Progressing   Breath sounds clear, continuing treatments as ordered.  Patient mutually agreed on goals.

## 2024-06-22 NOTE — PLAN OF CARE
Problem: Discharge Planning  Goal: Discharge to home or other facility with appropriate resources  6/22/2024 0146 by Kimberly Pardo RN  Outcome: Progressing  Flowsheets (Taken 6/22/2024 0146)  Discharge to home or other facility with appropriate resources:   Identify barriers to discharge with patient and caregiver   Identify discharge learning needs (meds, wound care, etc)     Problem: ABCDS Injury Assessment  Goal: Absence of physical injury  6/22/2024 0146 by Kimberly Pardo RN  Outcome: Progressing  Flowsheets (Taken 6/22/2024 0146)  Absence of Physical Injury: Implement safety measures based on patient assessment     Problem: Safety - Adult  Goal: Free from fall injury  6/22/2024 0146 by Kimberly Pardo RN  Outcome: Progressing  Flowsheets (Taken 6/22/2024 0146)  Free From Fall Injury: Based on caregiver fall risk screen, instruct family/caregiver to ask for assistance with transferring infant if caregiver noted to have fall risk factors     Problem: Cardiovascular - Adult  Goal: Maintains optimal cardiac output and hemodynamic stability  6/22/2024 0146 by Kimberly Pardo RN  Outcome: Progressing  Flowsheets (Taken 6/22/2024 0146)  Maintains optimal cardiac output and hemodynamic stability: Monitor blood pressure and heart rate     Problem: Cardiovascular - Adult  Goal: Absence of cardiac dysrhythmias or at baseline  6/22/2024 0146 by Kimberly Pardo RN  Outcome: Progressing  Flowsheets (Taken 6/22/2024 0146)  Absence of cardiac dysrhythmias or at baseline:   Monitor cardiac rate and rhythm   Administer antiarrhythmia medication and electrolyte replacement as ordered     Problem: Skin/Tissue Integrity - Adult  Goal: Skin integrity remains intact  6/22/2024 0146 by Kimberly Pardo RN  Outcome: Progressing  Flowsheets (Taken 6/22/2024 0146)  Skin Integrity Remains Intact:   Monitor for areas of redness and/or skin breakdown   Assess vascular access sites hourly     Problem: Pain  Goal:

## 2024-06-22 NOTE — PROGRESS NOTES
Hospitalist Progress Note      Patient:  Erin Hanna    Unit/Bed:3B-25/025-A  YOB: 1945  MRN: [id number]   Acct: [id number]   PCP: Claudine Gutierrez APRN - CNP  Date of Admission: 6/17/2024    Assessment/Plan:  #STEMI .S/P PCI TO RCA.  -Presented to ED with shortness of breath.   - EKG showing inferolateral ST elevations.  Troponin 136. -Interventional cardiology consulted.  S/p PCI of RCA w/ SANTA overlapping x 3.   -Cath also showed 50% Ost to LAD stenosis, 40% left circumflex stenossi, 40% RPDA stenosis, and 95% stenosis of RPAV.  -Continue  DAPT with aspirin and Brilinta and high intensity statin,beta blocker.  -Cardiology following.    #Ischemic cardiomyopathy.  -ECHO done showed EF of 40-45%  -Euvolemic at this time.  -Will resume home benazepril.Continue with beta blocker.  -Cardiology following.  .  #Bronchitis/Pneumonia  - Pneumonia panel positive for  staph aureus.  -As per pharmacy its MSSA so vancomycin was not given and patient continued on Augmentin.  -Patient reports worsening cough and SOB  -Antibiotics to Zosyn as patient had poor response to Augmentin  -CT  chest.done showed 8.3 x 5.6 x 6 cm mass in the right lower lobe.Diffuse COPD and thickening of the interstitial lung markings.  -Pulmonology consulted.      #ANNA on CKD IIIa.  -Renal US negative for any acute findings.  -Resolved with iv hydration.    #Hypertension:   -Home medications benazepril and amlodipine.Held by ICU team.Will resume before discharge.    #Hypoalbuminemia: 3.2.  Likely in setting of poor general nutrition given age and body habitus.   - BMI 17.89.        Initial H and P:-    89-year-old female with history of hypertension and CKD 3A presenting with worsening shortness of breath.  This been going on for the past few days.  Woke up very early this morning with severe shortness of breath.  Denied any chest pain/arm pain/jaw pain at that  estimated right atrial pressure is elevated (~15 mmHg). IVC is dilated.   Image quality is technically difficult. Technically difficult study due to patient's heart rhythm and procedure performed with the patient in a sitting position.     XR CHEST PORTABLE    Result Date: 6/18/2024  PROCEDURE: XR CHEST PORTABLE CLINICAL INFORMATION: SOB COMPARISON: 6/17/2024 TECHNIQUE: A single mobile view of the chest was obtained.     1. Borderline heart size. Soft tissue fullness projected over the right side of the cardiac silhouette, probably due to a moderate size hiatus hernia. 2. No acute infiltrates or effusions are seen. Prior cholecystectomy. **This report has been created using voice recognition software.  It may contain minor errors which are inherent in voice recognition technology.** Electronically signed by Dr. Johnny Gutierres    XR CHEST PORTABLE    Result Date: 6/17/2024  PROCEDURE: XR CHEST PORTABLE CLINICAL INFORMATION: SOB, LE edema. COMPARISON: 12/12/2008 TECHNIQUE: A single mobile view of the chest was obtained.     1. Borderline heart size. No effusion. Apparent prior cholecystectomy. 2. Abnormal increased density right cardiophrenic sulcus. Possibilities include pneumonia versus mass lesion. 3. CT thorax recommended for further evaluation. **This report has been created using voice recognition software.  It may contain minor errors which are inherent in voice recognition technology.** Electronically signed by Dr. Johnny Gutierres    US RENAL COMPLETE    Result Date: 6/17/2024  PROCEDURE: US RENAL COMPLETE CLINICAL INFORMATION: Acute kidney injury TECHNIQUE: Ultrasound of the kidneys and urinary bladder was performed. Grayscale and color images were obtained. COMPARISON: Renal ultrasound 9/22/2021 FINDINGS: The right kidney measures 10.0 x 4.2 x 5.0 cm and the left kidney measures 5.5 x 4.0 x 3.3 cm. There is cortical thinning on the left side. There is no hydronephrosis, calculi or intrarenal mass. Color Doppler

## 2024-06-22 NOTE — CONSULTS
Cancer Neg Hx      Sleep History    n/a  PFT   None   Review of Systems:    General/Constitutional: No recent loss of weight or appetite changes. No fever or chills.  HENT: Negative.   Eyes: Negative.  Respiratory: see HPI   Cardiovascular: No palpitations, chest pain or edema.   Gastrointestinal: No nausea or vomiting.  Neurological: No focal neurological weakness.  Extremities: No tenderness.  Musculoskeletal: Chronic arthritis  Genitourinary: No complaints.  Hematological: Negative. Denies easy buising  Skin: No itching.    Occupation: retired.      Erin Hanna denies history of exposure to Asbestos or Silicone.She denies history of exposure to coal, foundry, quarry, or significant farm dust exposure.  Patient denies any recent travel history.  Patient denies history of exposure to birds, pigeons, or chickens in the past. The patient denies pet animals at home. She had none  in the home.  She  denies to TB exposure in the past.  She denies to history of recreational or IV drug use.     Vitals    Vitals    height is 1.6 m (5' 3\") and weight is 42.7 kg (94 lb 3.2 oz). Her oral temperature is 98 °F (36.7 °C). Her blood pressure is 107/43 (abnormal) and her pulse is 79. Her respiration is 16 and oxygen saturation is 97%.     O2 Flow Rate (L/min): 1 L/min  I/O    Intake/Output Summary (Last 24 hours) at 6/22/2024 0959  Last data filed at 6/22/2024 0346  Gross per 24 hour   Intake 455 ml   Output 0 ml   Net 455 ml     Patient Vitals for the past 96 hrs (Last 3 readings):   Weight   06/21/24 0203 42.7 kg (94 lb 3.2 oz)   06/20/24 0445 43.8 kg (96 lb 9 oz)     Exam   Physical Exam   Constitutional: No distress on O2 per NC at 1LPM , sitting up in bedside chair . Patient appears moderately built and  moderately nourished.   Head: Normocephalic and atraumatic.   Mouth/Throat: Oropharynx is clear and moist.  No oral thrush.  Eyes: Conjunctivae are normal.   Neck: Neck supple. No tracheal deviation present.    Consult to Dr Tone Lugo DO for evaluation for Robotic Bronchoscopy with biopsy   Continue anticoagulants per cardiology   DT prophylaxis: Brilinta / ANNA hose - per primary   Will need outpatient PFTs at some point to evaluate her COPD       Plan of care discussed with the RN taking care of the patient  Case was reviewed and discussed with Dr Aleksey MD   Plan of care discussed with the patient, all questions and concerns addressed    \"Thank you for allowing us to participate in the care of this patient\"    JARROD Ibarra - CNP on 6/22/2024 at 9:59 AM

## 2024-06-23 LAB
ANION GAP SERPL CALC-SCNC: 11 MEQ/L (ref 8–16)
BUN SERPL-MCNC: 14 MG/DL (ref 7–22)
CALCIUM SERPL-MCNC: 8.6 MG/DL (ref 8.5–10.5)
CHLORIDE SERPL-SCNC: 101 MEQ/L (ref 98–111)
CO2 SERPL-SCNC: 25 MEQ/L (ref 23–33)
CREAT SERPL-MCNC: 0.9 MG/DL (ref 0.4–1.2)
GFR SERPL CREATININE-BSD FRML MDRD: 65 ML/MIN/1.73M2
GLUCOSE SERPL-MCNC: 102 MG/DL (ref 70–108)
MAGNESIUM SERPL-MCNC: 1.9 MG/DL (ref 1.6–2.4)
POTASSIUM SERPL-SCNC: 3.8 MEQ/L (ref 3.5–5.2)
SODIUM SERPL-SCNC: 137 MEQ/L (ref 135–145)

## 2024-06-23 PROCEDURE — 6370000000 HC RX 637 (ALT 250 FOR IP)

## 2024-06-23 PROCEDURE — 99232 SBSQ HOSP IP/OBS MODERATE 35: CPT | Performed by: NURSE PRACTITIONER

## 2024-06-23 PROCEDURE — 6370000000 HC RX 637 (ALT 250 FOR IP): Performed by: INTERNAL MEDICINE

## 2024-06-23 PROCEDURE — 83735 ASSAY OF MAGNESIUM: CPT

## 2024-06-23 PROCEDURE — 6370000000 HC RX 637 (ALT 250 FOR IP): Performed by: PHYSICIAN ASSISTANT

## 2024-06-23 PROCEDURE — 99232 SBSQ HOSP IP/OBS MODERATE 35: CPT | Performed by: INTERNAL MEDICINE

## 2024-06-23 PROCEDURE — 6360000002 HC RX W HCPCS: Performed by: INTERNAL MEDICINE

## 2024-06-23 PROCEDURE — 2580000003 HC RX 258: Performed by: INTERNAL MEDICINE

## 2024-06-23 PROCEDURE — 36415 COLL VENOUS BLD VENIPUNCTURE: CPT

## 2024-06-23 PROCEDURE — 2140000000 HC CCU INTERMEDIATE R&B

## 2024-06-23 PROCEDURE — 80048 BASIC METABOLIC PNL TOTAL CA: CPT

## 2024-06-23 RX ORDER — FLUTICASONE PROPIONATE 50 MCG
1 SPRAY, SUSPENSION (ML) NASAL DAILY
Status: DISCONTINUED | OUTPATIENT
Start: 2024-06-23 | End: 2024-07-06 | Stop reason: HOSPADM

## 2024-06-23 RX ADMIN — TICAGRELOR 90 MG: 90 TABLET ORAL at 19:25

## 2024-06-23 RX ADMIN — TICAGRELOR 90 MG: 90 TABLET ORAL at 09:17

## 2024-06-23 RX ADMIN — ASPIRIN 81 MG 81 MG: 81 TABLET ORAL at 09:17

## 2024-06-23 RX ADMIN — POTASSIUM CHLORIDE 20 MEQ: 750 TABLET, EXTENDED RELEASE ORAL at 14:14

## 2024-06-23 RX ADMIN — ATORVASTATIN CALCIUM 40 MG: 40 TABLET, FILM COATED ORAL at 19:25

## 2024-06-23 RX ADMIN — FLUTICASONE PROPIONATE 1 SPRAY: 50 SPRAY, METERED NASAL at 14:14

## 2024-06-23 RX ADMIN — PIPERACILLIN AND TAZOBACTAM 3375 MG: 3; .375 INJECTION, POWDER, FOR SOLUTION INTRAVENOUS at 09:14

## 2024-06-23 RX ADMIN — PIPERACILLIN AND TAZOBACTAM 3375 MG: 3; .375 INJECTION, POWDER, FOR SOLUTION INTRAVENOUS at 00:21

## 2024-06-23 RX ADMIN — METOPROLOL SUCCINATE 25 MG: 25 TABLET, FILM COATED, EXTENDED RELEASE ORAL at 09:18

## 2024-06-23 RX ADMIN — SODIUM CHLORIDE, PRESERVATIVE FREE 10 ML: 5 INJECTION INTRAVENOUS at 09:16

## 2024-06-23 RX ADMIN — PIPERACILLIN AND TAZOBACTAM 3375 MG: 3; .375 INJECTION, POWDER, FOR SOLUTION INTRAVENOUS at 23:57

## 2024-06-23 RX ADMIN — PIPERACILLIN AND TAZOBACTAM 3375 MG: 3; .375 INJECTION, POWDER, FOR SOLUTION INTRAVENOUS at 15:48

## 2024-06-23 NOTE — CONSULTS
Department of Internal Medicine  Interventional Pulmonary  Attending Consult Note      Reason for Consult:  Lung mass  Requesting Physician:  Dr. Ryder    CHIEF COMPLAINT:  Chest pain    History Obtained From:  patient    HISTORY OF PRESENT ILLNESS:                The patient is a 79 y.o. female presented with chest pain noted to have STEMI and underwent PCI for this. During recovery phase she was noted to have issues with URI type symptoms and thus CT scan was performed showing a mass in the right lower lobe. The patient is resting comfortably in bed sitting in her chair preparing to eat breakfast. The patient notes no significant worsening of her shortness of breath, no chest pain, n/v/d or abdominal pain. The patient is followed by [facility] pulmonary service and will continue to follow with them. No other noted contributing factors/complaints, all other ROS negative.    Past Medical History:        Diagnosis Date    Hypertension      Past Surgical History:        Procedure Laterality Date    CARDIAC PROCEDURE N/A 6/17/2024    Coronary angiography performed by Brock Fuller MD at [facility] CARDIAC CATH LAB    CARDIAC PROCEDURE N/A 6/17/2024    Percutaneous coronary intervention performed by Brock Fuller MD at [facility] CARDIAC CATH LAB    CARDIAC PROCEDURE N/A 6/17/2024    Optical coherence tomagraphy performed by Brock Fuller MD at [facility] CARDIAC CATH LAB    CHOLECYSTECTOMY      COLONOSCOPY      TUBAL LIGATION       Current Medications:    Current Facility-Administered Medications: albuterol sulfate HFA (PROVENTIL;VENTOLIN;PROAIR) 108 (90 Base) MCG/ACT inhaler 2 puff, 2 puff, Inhalation, Q4H PRN  [COMPLETED] piperacillin-tazobactam (ZOSYN) 4,500 mg in sodium chloride 0.9 % 100 mL IVPB (mini-bag), 4,500 mg, IntraVENous, Once **FOLLOWED BY** piperacillin-tazobactam (ZOSYN) 3,375 mg in sodium chloride 0.9 % 50 mL IVPB (mini-bag), 3,375 mg, IntraVENous, Q8H  ALPRAZolam (XANAX) tablet 0.25 mg, 0.25 mg, Oral,

## 2024-06-23 NOTE — PLAN OF CARE
Problem: Discharge Planning  Goal: Discharge to home or other facility with appropriate resources  Outcome: Progressing  Flowsheets (Taken 6/22/2024 0146)  Discharge to home or other facility with appropriate resources:   Identify barriers to discharge with patient and caregiver   Identify discharge learning needs (meds, wound care, etc)     Problem: ABCDS Injury Assessment  Goal: Absence of physical injury  Outcome: Progressing  Flowsheets (Taken 6/22/2024 0146)  Absence of Physical Injury: Implement safety measures based on patient assessment     Problem: Safety - Adult  Goal: Free from fall injury  Outcome: Progressing  Flowsheets (Taken 6/22/2024 2253)  Free From Fall Injury: Based on caregiver fall risk screen, instruct family/caregiver to ask for assistance with transferring infant if caregiver noted to have fall risk factors     Problem: Cardiovascular - Adult  Goal: Maintains optimal cardiac output and hemodynamic stability  Outcome: Progressing  Flowsheets (Taken 6/22/2024 0146)  Maintains optimal cardiac output and hemodynamic stability: Monitor blood pressure and heart rate     Problem: Cardiovascular - Adult  Goal: Absence of cardiac dysrhythmias or at baseline  Outcome: Progressing  Flowsheets (Taken 6/22/2024 2253)  Absence of cardiac dysrhythmias or at baseline: Monitor cardiac rate and rhythm     Problem: Skin/Tissue Integrity - Adult  Goal: Skin integrity remains intact  Outcome: Progressing  Flowsheets (Taken 6/22/2024 2253)  Skin Integrity Remains Intact: Monitor for areas of redness and/or skin breakdown     Problem: Pain  Goal: Verbalizes/displays adequate comfort level or baseline comfort level  Outcome: Progressing  Flowsheets (Taken 6/22/2024 2253)  Verbalizes/displays adequate comfort level or baseline comfort level:   Assess pain using appropriate pain scale   Encourage patient to monitor pain and request assistance     Problem: Skin/Tissue Integrity  Goal: Absence of new skin

## 2024-06-23 NOTE — PROGRESS NOTES
Hospitalist Progress Note      Patient:  Erin Hanna    Unit/Bed:3B-25/025-A  YOB: 1945  MRN: [id number]   Acct: [id number]   PCP: Claudine Gutierrez APRN - CNP  Date of Admission: 6/17/2024    Assessment/Plan:    #STEMI .S/P PCI TO RCA.  -Presented to ED with shortness of breath.   - EKG showing inferolateral ST elevations.  Troponin 136. -Interventional cardiology consulted.  S/p PCI of RCA w/ SANTA overlapping x 3.   -Cath also showed 50% Ost to LAD stenosis, 40% left circumflex stenossi, 40% RPDA stenosis, and 95% stenosis of RPAV.  -Continue  DAPT with aspirin and Brilinta and high intensity statin,beta blocker.  -Cardiology following.    #Ischemic cardiomyopathy.  -ECHO done showed EF of 40-45%  -Euvolemic at this time.  -Will resume home benazepril.Continue with beta blocker.  -Cardiology following.  .  #Bronchitis/Pneumonia  - Pneumonia panel positive for  staph aureus.  -As per pharmacy its MSSA so vancomycin was not given and patient continued on Augmentin.  -Patient reports worsening cough and SOB.  -Pneumonia panel positive for Staph aureus.  -Antibiotic changed to  Zosyn as Patient had poor response to Augmentin.  -CT  chest.done showed 8.3 x 5.6 x 6 cm mass in the right lower lobe.Diffuse COPD and thickening of the interstitial lung markings.  -Pulmonology consulted.  -Possible robotic bronchoscopy with biopsy.    #ANNA on CKD IIIa.  -Renal US negative for any acute findings.  -Resolved with iv hydration.    #Hypertension:   -Home medications benazepril and amlodipine.Held by ICU team.Will resume before discharge.    #Hypoalbuminemia: 3.2.  Likely in setting of poor general nutrition given age and body habitus.   - BMI 17.89.    DAILY ROUNDS CHECKLIST    DISPO: SNF    START PRE-CERT?: Yes  DME: N/A  ESTIMATED DAY OF DC:  depending on clinical course    BRIEF PLAN OF CARE:   STEMI S/P PCI TO RCA  PNEUMONIA ON IV

## 2024-06-23 NOTE — PROGRESS NOTES
obtained         Component  Ref Range & Units 4 d ago   Source see below   Comment: Expectorated Sputum   Specimen Acceptability see below   Comment: Acceptable, >=25 WBCs/LPF   Acinetobacter calcoaceticus-baumannii by PCR  Not Detected Not Detected   Enterobacter cloacae complex by PCR  Not Detected Not Detected   Escherichia coli by PCR  Not Detected Not Detected   Haemophilus Influenzae by PCR  Not Detected Not Detected   Klebsiella aerogenes by PCR  Not Detected Not Detected   Klebsiella oxytoca by PCR  Not Detected Not Detected   Klebsiella pneumoniae group by PCR  Not Detected Not Detected   Moraxella catarrhalis by PCR  Not Detected Not Detected   Proteus species by PCR  Not Detected Not Detected   Pseudomonas aeruginosa by PCR  Not Detected Not Detected   Serratia marcescens by PCR  Not Detected Not Detected   Staph aureus by PCR  Not Detected Detected   Strep agalactiae by PCR  Not Detected Not Detected   Strep pneumoniae by PCR  Not Detected Not Detected   Strep pyogenes by PCR  Not Detected Not Detected   Resistant gene ctx-m by PCR  Not Detected N/A   Resistant gene imp by PCR  Not Detected N/A   Resistant gene kpc by PCR  Not Detected N/A   Resulting Agency  - [facility]           Eosinophil Smear, Urine  No Eos Seen No Eos Seen     MRSA PCR (-)   SARS COV-2 (-)   SPUTUM CULTURE:    Quality of sputum specimen: Specimen acceptable. Many segmented neutrophils observed. Rare epithelial cells observed. Few gram positive cocci occurring singly and in pairs. Rare gram negative bacilli.   ECHO   6/18/24    Left Ventricle: Mildly reduced left ventricular systolic function with a visually estimated EF of 40 - 45%. Left ventricle size is normal. Normal wall thickness. Mild global hypokinesis present. Grade I diastolic dysfunction with normal LAP.    Aortic Valve: Trileaflet valve. Moderately thickened cusp. Moderately calcified cusp.    Mitral Valve: Moderately thickened leaflet, at the posterior  leaflet. Moderately calcified leaflet, at the posterior leaflet.    Tricuspid Valve: Mild regurgitation. The estimated RVSP is 61 mmHg. The estimated RVSP is 61 mmHg.    Left Atrium: Left atrium is severely dilated.    Right Atrium: Right atrium is moderately dilated.    IVC/SVC: IVC diameter is greater than 21 mm and decreases less than 50% during inspiration; therefore the estimated right atrial pressure is elevated (~15 mmHg). IVC is dilated.    Image quality is technically difficult. Technically difficult study due to patient's heart rhythm and procedure performed with the patient in a sitting position.     Radiology    CXR    CT Scans  PROCEDURE: CT CHEST WO CONTRAST  COMPARISON: Chest x-ray dated 6/22/2024..    There is mild cardiomegaly. There is mitral valve calcification. There is a  probable stent in the right coronary artery.. There is atherosclerotic  calcification in the thoracic aorta.. There is no gross abnormality of the  pulmonary artery. There is no mediastinal, axillary or hilar adenopathy. There  is no pericardial or pleural effusion.      There is diffuse COPD and thickening of the interstitial lung markings. There is  a superimposed mass in the right lower lobe measuring 8.3 x 5.6 x 6 cm in size.     There is thoracic spondylosis. . There is a mild compression deformity involving  the T10 vertebral body.     IMPRESSION:     1. 8.3 x 5.6 x 6 cm mass in the right lower lobe.  2. Diffuse COPD and thickening of the interstitial lung markings.  3. Mild cardiomegaly. Mitral valve calcification. Probable stent in the right  coronary artery.  4. Thoracic spondylosis. Mild compression deformity involving the T10 vertebral  body.         Assessment   New 8.3 x 5.6 x 6 cm mass in the right lower lobe: differentials include pneumonia pt does have (+) sputum culture for staph auerus vs. Malignancy   New STEMI s/p PCI RCA 6/18/24 , on anticoagulant with Brillinta, cardiology following   COPD , uncertain

## 2024-06-23 NOTE — PLAN OF CARE
Problem: Respiratory - Adult  Goal: Clear lung sounds  Outcome: Progressing   Pt states that she is feeling good today and didn't want any treatments. She was informed that she can call for them.

## 2024-06-24 ENCOUNTER — ANESTHESIA EVENT (OUTPATIENT)
Dept: ENDOSCOPY | Age: 79
End: 2024-06-24
Payer: MEDICARE

## 2024-06-24 ENCOUNTER — ANESTHESIA (OUTPATIENT)
Dept: ENDOSCOPY | Age: 79
End: 2024-06-24
Payer: MEDICARE

## 2024-06-24 PROBLEM — Z77.22 TOBACCO SMOKE EXPOSURE: Status: ACTIVE | Noted: 2024-06-24

## 2024-06-24 PROBLEM — R59.0 MEDIASTINAL LYMPHADENOPATHY: Status: ACTIVE | Noted: 2024-06-24

## 2024-06-24 LAB
ANION GAP SERPL CALC-SCNC: 10 MEQ/L (ref 8–16)
BASOPHILS ABSOLUTE: 0 THOU/MM3 (ref 0–0.1)
BASOPHILS NFR BLD AUTO: 0.1 %
BUN SERPL-MCNC: 12 MG/DL (ref 7–22)
CALCIUM SERPL-MCNC: 8.7 MG/DL (ref 8.5–10.5)
CHLORIDE SERPL-SCNC: 102 MEQ/L (ref 98–111)
CO2 SERPL-SCNC: 25 MEQ/L (ref 23–33)
CREAT SERPL-MCNC: 0.9 MG/DL (ref 0.4–1.2)
DEPRECATED RDW RBC AUTO: 47.7 FL (ref 35–45)
EOSINOPHIL NFR BLD AUTO: 0.1 %
EOSINOPHILS ABSOLUTE: 0 THOU/MM3 (ref 0–0.4)
ERYTHROCYTE [DISTWIDTH] IN BLOOD BY AUTOMATED COUNT: 13.8 % (ref 11.5–14.5)
GFR SERPL CREATININE-BSD FRML MDRD: 65 ML/MIN/1.73M2
GLUCOSE SERPL-MCNC: 118 MG/DL (ref 70–108)
HCT VFR BLD AUTO: 41.7 % (ref 37–47)
HGB BLD-MCNC: 13.4 GM/DL (ref 12–16)
IMM GRANULOCYTES # BLD AUTO: 0.03 THOU/MM3 (ref 0–0.07)
IMM GRANULOCYTES NFR BLD AUTO: 0.4 %
LYMPHOCYTES ABSOLUTE: 0.5 THOU/MM3 (ref 1–4.8)
LYMPHOCYTES NFR BLD AUTO: 7.3 %
MAGNESIUM SERPL-MCNC: 1.8 MG/DL (ref 1.6–2.4)
MCH RBC QN AUTO: 30.3 PG (ref 26–33)
MCHC RBC AUTO-ENTMCNC: 32.1 GM/DL (ref 32.2–35.5)
MCV RBC AUTO: 94.3 FL (ref 81–99)
MONOCYTES ABSOLUTE: 0.7 THOU/MM3 (ref 0.4–1.3)
MONOCYTES NFR BLD AUTO: 9.5 %
NEUTROPHILS ABSOLUTE: 6.2 THOU/MM3 (ref 1.8–7.7)
NEUTROPHILS NFR BLD AUTO: 82.6 %
NRBC BLD AUTO-RTO: 0 /100 WBC
PLATELET # BLD AUTO: 166 THOU/MM3 (ref 130–400)
PMV BLD AUTO: 12.1 FL (ref 9.4–12.4)
POTASSIUM SERPL-SCNC: 4 MEQ/L (ref 3.5–5.2)
RBC # BLD AUTO: 4.42 MILL/MM3 (ref 4.2–5.4)
REASON FOR REJECTION: NORMAL
REJECTED TEST: NORMAL
SODIUM SERPL-SCNC: 137 MEQ/L (ref 135–145)
WBC # BLD AUTO: 7.5 THOU/MM3 (ref 4.8–10.8)

## 2024-06-24 PROCEDURE — 97530 THERAPEUTIC ACTIVITIES: CPT

## 2024-06-24 PROCEDURE — 2140000000 HC CCU INTERMEDIATE R&B

## 2024-06-24 PROCEDURE — 6360000002 HC RX W HCPCS: Performed by: NURSE ANESTHETIST, CERTIFIED REGISTERED

## 2024-06-24 PROCEDURE — 0BC68ZZ EXTIRPATION OF MATTER FROM RIGHT LOWER LOBE BRONCHUS, VIA NATURAL OR ARTIFICIAL OPENING ENDOSCOPIC: ICD-10-PCS | Performed by: INTERNAL MEDICINE

## 2024-06-24 PROCEDURE — 6360000002 HC RX W HCPCS: Performed by: INTERNAL MEDICINE

## 2024-06-24 PROCEDURE — 6370000000 HC RX 637 (ALT 250 FOR IP)

## 2024-06-24 PROCEDURE — 2580000003 HC RX 258: Performed by: NURSE ANESTHETIST, CERTIFIED REGISTERED

## 2024-06-24 PROCEDURE — 6360000002 HC RX W HCPCS

## 2024-06-24 PROCEDURE — 99233 SBSQ HOSP IP/OBS HIGH 50: CPT | Performed by: INTERNAL MEDICINE

## 2024-06-24 PROCEDURE — 94761 N-INVAS EAR/PLS OXIMETRY MLT: CPT

## 2024-06-24 PROCEDURE — 3603165200 HC BRNCHSC EBUS GUIDED SAMPL 1/2 NODE STATION/STRUX: Performed by: INTERNAL MEDICINE

## 2024-06-24 PROCEDURE — 36415 COLL VENOUS BLD VENIPUNCTURE: CPT

## 2024-06-24 PROCEDURE — 6370000000 HC RX 637 (ALT 250 FOR IP): Performed by: NURSE PRACTITIONER

## 2024-06-24 PROCEDURE — 88173 CYTOPATH EVAL FNA REPORT: CPT

## 2024-06-24 PROCEDURE — 88172 CYTP DX EVAL FNA 1ST EA SITE: CPT

## 2024-06-24 PROCEDURE — 07B74ZX EXCISION OF THORAX LYMPHATIC, PERCUTANEOUS ENDOSCOPIC APPROACH, DIAGNOSTIC: ICD-10-PCS | Performed by: INTERNAL MEDICINE

## 2024-06-24 PROCEDURE — 6370000000 HC RX 637 (ALT 250 FOR IP): Performed by: INTERNAL MEDICINE

## 2024-06-24 PROCEDURE — 94640 AIRWAY INHALATION TREATMENT: CPT

## 2024-06-24 PROCEDURE — 80048 BASIC METABOLIC PNL TOTAL CA: CPT

## 2024-06-24 PROCEDURE — 99232 SBSQ HOSP IP/OBS MODERATE 35: CPT | Performed by: INTERNAL MEDICINE

## 2024-06-24 PROCEDURE — 2500000003 HC RX 250 WO HCPCS: Performed by: NURSE ANESTHETIST, CERTIFIED REGISTERED

## 2024-06-24 PROCEDURE — 83735 ASSAY OF MAGNESIUM: CPT

## 2024-06-24 PROCEDURE — 85025 COMPLETE CBC W/AUTO DIFF WBC: CPT

## 2024-06-24 PROCEDURE — 2700000000 HC OXYGEN THERAPY PER DAY

## 2024-06-24 PROCEDURE — 6370000000 HC RX 637 (ALT 250 FOR IP): Performed by: ANESTHESIOLOGY

## 2024-06-24 PROCEDURE — 3700000000 HC ANESTHESIA ATTENDED CARE: Performed by: INTERNAL MEDICINE

## 2024-06-24 PROCEDURE — 2580000003 HC RX 258: Performed by: INTERNAL MEDICINE

## 2024-06-24 PROCEDURE — 88305 TISSUE EXAM BY PATHOLOGIST: CPT

## 2024-06-24 PROCEDURE — 3700000001 HC ADD 15 MINUTES (ANESTHESIA): Performed by: INTERNAL MEDICINE

## 2024-06-24 RX ORDER — VASOPRESSIN 20 U/ML
INJECTION PARENTERAL PRN
Status: DISCONTINUED | OUTPATIENT
Start: 2024-06-24 | End: 2024-06-24 | Stop reason: SDUPTHER

## 2024-06-24 RX ORDER — PROPOFOL 10 MG/ML
INJECTION, EMULSION INTRAVENOUS PRN
Status: DISCONTINUED | OUTPATIENT
Start: 2024-06-24 | End: 2024-06-24 | Stop reason: SDUPTHER

## 2024-06-24 RX ORDER — ROCURONIUM BROMIDE 10 MG/ML
INJECTION, SOLUTION INTRAVENOUS PRN
Status: DISCONTINUED | OUTPATIENT
Start: 2024-06-24 | End: 2024-06-24 | Stop reason: SDUPTHER

## 2024-06-24 RX ORDER — SODIUM CHLORIDE 0.9 % (FLUSH) 0.9 %
5-40 SYRINGE (ML) INJECTION EVERY 12 HOURS SCHEDULED
Status: DISCONTINUED | OUTPATIENT
Start: 2024-06-24 | End: 2024-06-24 | Stop reason: HOSPADM

## 2024-06-24 RX ORDER — SODIUM CHLORIDE 9 MG/ML
INJECTION, SOLUTION INTRAVENOUS CONTINUOUS PRN
Status: DISCONTINUED | OUTPATIENT
Start: 2024-06-24 | End: 2024-06-24 | Stop reason: SDUPTHER

## 2024-06-24 RX ORDER — SODIUM CHLORIDE 0.9 % (FLUSH) 0.9 %
5-40 SYRINGE (ML) INJECTION PRN
Status: DISCONTINUED | OUTPATIENT
Start: 2024-06-24 | End: 2024-06-24 | Stop reason: HOSPADM

## 2024-06-24 RX ORDER — SODIUM CHLORIDE 9 MG/ML
INJECTION, SOLUTION INTRAVENOUS PRN
Status: DISCONTINUED | OUTPATIENT
Start: 2024-06-24 | End: 2024-06-24 | Stop reason: HOSPADM

## 2024-06-24 RX ORDER — LIDOCAINE HYDROCHLORIDE 20 MG/ML
INJECTION, SOLUTION INTRAVENOUS PRN
Status: DISCONTINUED | OUTPATIENT
Start: 2024-06-24 | End: 2024-06-24 | Stop reason: SDUPTHER

## 2024-06-24 RX ORDER — METOPROLOL SUCCINATE 50 MG/1
50 TABLET, EXTENDED RELEASE ORAL DAILY
Status: DISCONTINUED | OUTPATIENT
Start: 2024-06-25 | End: 2024-07-05

## 2024-06-24 RX ORDER — IPRATROPIUM BROMIDE AND ALBUTEROL SULFATE 2.5; .5 MG/3ML; MG/3ML
1 SOLUTION RESPIRATORY (INHALATION) ONCE
Status: COMPLETED | OUTPATIENT
Start: 2024-06-24 | End: 2024-06-24

## 2024-06-24 RX ORDER — DEXAMETHASONE SODIUM PHOSPHATE 4 MG/ML
INJECTION, SOLUTION INTRA-ARTICULAR; INTRALESIONAL; INTRAMUSCULAR; INTRAVENOUS; SOFT TISSUE PRN
Status: DISCONTINUED | OUTPATIENT
Start: 2024-06-24 | End: 2024-06-24 | Stop reason: SDUPTHER

## 2024-06-24 RX ORDER — ONDANSETRON 2 MG/ML
INJECTION INTRAMUSCULAR; INTRAVENOUS PRN
Status: DISCONTINUED | OUTPATIENT
Start: 2024-06-24 | End: 2024-06-24 | Stop reason: SDUPTHER

## 2024-06-24 RX ORDER — LIDOCAINE HYDROCHLORIDE 40 MG/ML
2.5 INJECTION, SOLUTION RETROBULBAR ONCE
Status: DISCONTINUED | OUTPATIENT
Start: 2024-06-24 | End: 2024-06-24 | Stop reason: HOSPADM

## 2024-06-24 RX ORDER — EPHEDRINE SULFATE/0.9% NACL/PF 25 MG/5 ML
SYRINGE (ML) INTRAVENOUS PRN
Status: DISCONTINUED | OUTPATIENT
Start: 2024-06-24 | End: 2024-06-24 | Stop reason: SDUPTHER

## 2024-06-24 RX ORDER — METOPROLOL SUCCINATE 25 MG/1
25 TABLET, EXTENDED RELEASE ORAL ONCE
Status: COMPLETED | OUTPATIENT
Start: 2024-06-24 | End: 2024-06-24

## 2024-06-24 RX ADMIN — PHENYLEPHRINE HYDROCHLORIDE 100 MCG: 10 INJECTION INTRAVENOUS at 11:31

## 2024-06-24 RX ADMIN — VASOPRESSIN 2 UNITS: 20 INJECTION INTRAVENOUS at 11:52

## 2024-06-24 RX ADMIN — PIPERACILLIN AND TAZOBACTAM 3375 MG: 3; .375 INJECTION, POWDER, FOR SOLUTION INTRAVENOUS at 21:15

## 2024-06-24 RX ADMIN — PHENYLEPHRINE HYDROCHLORIDE 200 MCG: 10 INJECTION INTRAVENOUS at 11:44

## 2024-06-24 RX ADMIN — EPHEDRINE SULFATE 10 MG: 5 INJECTION INTRAVENOUS at 11:45

## 2024-06-24 RX ADMIN — Medication 100 MG: at 11:31

## 2024-06-24 RX ADMIN — PHENYLEPHRINE HYDROCHLORIDE 200 MCG: 10 INJECTION INTRAVENOUS at 11:37

## 2024-06-24 RX ADMIN — EPHEDRINE SULFATE 10 MG: 5 INJECTION INTRAVENOUS at 11:40

## 2024-06-24 RX ADMIN — EPHEDRINE SULFATE 5 MG: 5 INJECTION INTRAVENOUS at 11:48

## 2024-06-24 RX ADMIN — SUGAMMADEX 200 MG: 100 INJECTION, SOLUTION INTRAVENOUS at 12:00

## 2024-06-24 RX ADMIN — METOPROLOL SUCCINATE 25 MG: 25 TABLET, EXTENDED RELEASE ORAL at 17:15

## 2024-06-24 RX ADMIN — ASPIRIN 81 MG 81 MG: 81 TABLET ORAL at 17:14

## 2024-06-24 RX ADMIN — VASOPRESSIN 2 UNITS: 20 INJECTION INTRAVENOUS at 11:50

## 2024-06-24 RX ADMIN — TICAGRELOR 90 MG: 90 TABLET ORAL at 17:14

## 2024-06-24 RX ADMIN — ONDANSETRON 4 MG: 2 INJECTION INTRAMUSCULAR; INTRAVENOUS at 11:54

## 2024-06-24 RX ADMIN — DEXAMETHASONE SODIUM PHOSPHATE 8 MG: 4 INJECTION, SOLUTION INTRAMUSCULAR; INTRAVENOUS at 11:52

## 2024-06-24 RX ADMIN — PROPOFOL 150 MG: 10 INJECTION, EMULSION INTRAVENOUS at 11:31

## 2024-06-24 RX ADMIN — SODIUM CHLORIDE: 9 INJECTION, SOLUTION INTRAVENOUS at 11:15

## 2024-06-24 RX ADMIN — ROCURONIUM BROMIDE 30 MG: 10 INJECTION INTRAVENOUS at 11:31

## 2024-06-24 RX ADMIN — FLUTICASONE PROPIONATE 1 SPRAY: 50 SPRAY, METERED NASAL at 13:46

## 2024-06-24 RX ADMIN — IPRATROPIUM BROMIDE AND ALBUTEROL SULFATE 1 DOSE: .5; 3 SOLUTION RESPIRATORY (INHALATION) at 11:11

## 2024-06-24 RX ADMIN — SODIUM CHLORIDE, PRESERVATIVE FREE 10 ML: 5 INJECTION INTRAVENOUS at 21:11

## 2024-06-24 RX ADMIN — PIPERACILLIN AND TAZOBACTAM 3375 MG: 3; .375 INJECTION, POWDER, FOR SOLUTION INTRAVENOUS at 13:52

## 2024-06-24 RX ADMIN — SODIUM CHLORIDE, PRESERVATIVE FREE 10 ML: 5 INJECTION INTRAVENOUS at 13:46

## 2024-06-24 RX ADMIN — ATORVASTATIN CALCIUM 40 MG: 40 TABLET, FILM COATED ORAL at 21:10

## 2024-06-24 RX ADMIN — SODIUM CHLORIDE: 9 INJECTION, SOLUTION INTRAVENOUS at 12:05

## 2024-06-24 RX ADMIN — TICAGRELOR 90 MG: 90 TABLET ORAL at 21:11

## 2024-06-24 RX ADMIN — IPRATROPIUM BROMIDE 0.5 MG: 0.5 SOLUTION RESPIRATORY (INHALATION) at 07:39

## 2024-06-24 ASSESSMENT — PAIN SCALES - GENERAL: PAINLEVEL_OUTOF10: 4

## 2024-06-24 ASSESSMENT — PAIN DESCRIPTION - PAIN TYPE: TYPE: ACUTE PAIN

## 2024-06-24 ASSESSMENT — PAIN - FUNCTIONAL ASSESSMENT
PAIN_FUNCTIONAL_ASSESSMENT: ACTIVITIES ARE NOT PREVENTED
PAIN_FUNCTIONAL_ASSESSMENT: NONE - DENIES PAIN

## 2024-06-24 ASSESSMENT — PAIN DESCRIPTION - ORIENTATION: ORIENTATION: RIGHT

## 2024-06-24 ASSESSMENT — PAIN DESCRIPTION - FREQUENCY: FREQUENCY: INTERMITTENT

## 2024-06-24 ASSESSMENT — PAIN DESCRIPTION - DESCRIPTORS: DESCRIPTORS: ACHING

## 2024-06-24 ASSESSMENT — PAIN DESCRIPTION - LOCATION: LOCATION: RIB CAGE

## 2024-06-24 ASSESSMENT — PAIN DESCRIPTION - ONSET: ONSET: ON-GOING

## 2024-06-24 NOTE — PROGRESS NOTES
Cardiology progress Note      Pt had 15 beats NSVT this am - she was asymptomatic - Toprol increased to 50 mg po Daily - pt had received torpol 25 mg po daily for today - gave another 25 mg Toprol today to equal 50 mg     Mag 1.8  Potassium 4.0    Will monitor HR         JARROD Ferreira - CNP  11:38 AM  6/24/2024

## 2024-06-24 NOTE — PROCEDURES
Bronchoscopy Procedure Note    Date of Operation: 6/24/2024    Pre-op Diagnosis: Right lower lobe mass with lymphadenopathy    Post-op Diagnosis: same    Surgeon: NANY MENENDEZ DO    Assistants: Taylor Guido CNP    Anesthesia: General endotracheal anesthesia    Operation: Endobronchial ultrasound with biopsy of mass and lymph nodes (3 nodes) and 1 mass.    Findings: Station 4L 1.5 cm - tbna x 3 - slick negative        Station 7 2 cm - tbna x 3 - slick lymphocytes        Station 4R - small < 1cm - no biopsy        Station 10 R - large 2.5 cm - SLICK: positive non-small        Right lower lobe mass large > 4cm - SLICK: positive non-small    Specimen: Station 4L, 7, 10R, RLL mass    Estimated Blood Loss: Minimal    Drains: n/a    Complications: none apparent    Indications and History:  The patient is a 79 y.o. female with right lower lobe mass and lymphadenopathy.  The risks, benefits, complications, treatment options and expected outcomes were discussed with the patient.  The possibilities of reaction to medication, pulmonary aspiration, perforation of a viscus, bleeding, failure to diagnose a condition and creating a complication requiring transfusion or operation were discussed with the patient who freely signed the consent.      Description of Procedure:  The patient was seen in the Holding Room and the site of surgery properly noted/marked.  The patient was taken to OR 14, identified as Erin Hanna and the procedure verified as Flexible Fiberoptic Bronchoscopy.  A Time Out was held and the above information confirmed.     After adequate sedation the EBUS scope placed thru the ET tube and inspection of the distal trachea, left mainstem, upper lobe and lower lobe then right mainstem right upper, middle and lower lobe. There was thick mucous that was suctioned out and removed. Scope was changed to ultrasound mode and inspection of amendable lymph nodes done.  Biopsy of amendable lymph nodes performed as

## 2024-06-24 NOTE — PROGRESS NOTES
[facility]   PROGRESS NOTE      Patient: Erin Hanna  Room #: 3B-25/025-A            YOB: 1945  Age: 79 y.o.  Gender: female            Admit Date & Time: 6/17/2024  4:37 AM    Assessment:    The  attempted a visit and patient was not located in their room's care area.     Interventions:  The patient was not present to provide care for at this time.    Outcomes:  The patient was not present at this time.     Plan:  1.Spiritual care will continue to follow the patient according to [facility] spiritual care SOP.       Electronically signed by Chaplain Lis, on 6/24/2024 at 2:14 PM.  Spiritual Care Department  [facility]  [phone]     06/24/24 1413   Encounter Summary   Encounter Overview/Reason Attempted Encounter   Service Provided For Patient not available   Referral/Consult From Rounding   Support System Family members   Last Encounter  06/24/24   Complexity of Encounter Low   Begin Time 1300   End Time  1300   Total Time Calculated 0 min   Assessment/Intervention/Outcome   Assessment Unable to assess

## 2024-06-24 NOTE — CARE COORDINATION
6/24/24, 10:36 AM EDT    DISCHARGE PLANNING EVALUATION     Spoke with Lisa for [facility].  She reports that they are out of network and that the patient does not have any out of network benefit.  She will find out if [facility] is in network.  Also spoke with Quirino from [facility] and they do not have any beds at this time.      Update 11:37 am: Spoke with Lisa and she told SW that [facility] is also out of network.  Patient is off the floor at this time.  Will speak with her to make her aware none of the facilities she chose are able to accept.  Will need more options.      Update 2:55 pm: Spoke with Erin to make her aware [facility] are not in network with her insurance and she does not have out of network benefits.  Also told her [facility] does not have any beds.  She will talk with her family tonight to discuss more options.  Told her SW will stop back in the morning to discuss her choices.

## 2024-06-24 NOTE — H&P
Update History & Physical    The patient's History and Physical of June 23, this was reviewed with the patient and I examined the patient. There was no change. The surgical site was confirmed by the patient and me.       Plan: The risks, benefits, expected outcome, and alternative to the recommended procedure have been discussed with the patient. Patient understands and wants to proceed with the procedure.     Electronically signed by NANY MNEENDEZ DO on 6/24/2024 at 10:53 AM

## 2024-06-24 NOTE — ANESTHESIA PRE PROCEDURE
BP Readings from Last 3 Encounters:   06/24/24 123/63   10/09/23 123/75   09/15/22 (!) 142/74       NPO Status:                                                                                 BMI:   Wt Readings from Last 3 Encounters:   06/24/24 45.5 kg (100 lb 5 oz)   10/09/23 46.3 kg (102 lb)   09/15/22 47.9 kg (105 lb 9.6 oz)     Body mass index is 17.77 kg/m².    CBC:   Lab Results   Component Value Date/Time    WBC 7.5 06/24/2024 04:38 AM    RBC 4.42 06/24/2024 04:38 AM    HGB 13.4 06/24/2024 04:38 AM    HCT 41.7 06/24/2024 04:38 AM    MCV 94.3 06/24/2024 04:38 AM    RDW 15.3 08/18/2023 10:24 AM     06/24/2024 04:38 AM       CMP:   Lab Results   Component Value Date/Time     06/24/2024 06:32 AM    K 4.0 06/24/2024 06:32 AM     06/24/2024 06:32 AM    CO2 25 06/24/2024 06:32 AM    BUN 12 06/24/2024 06:32 AM    CREATININE 0.9 06/24/2024 06:32 AM    GFRAA 55 07/15/2021 10:28 AM    LABGLOM 65 06/24/2024 06:32 AM    LABGLOM 47 08/18/2023 10:24 AM    GLUCOSE 118 06/24/2024 06:32 AM    GLUCOSE 101 09/09/2022 11:29 AM    CALCIUM 8.7 06/24/2024 06:32 AM    BILITOT 0.4 06/17/2024 04:57 AM    ALKPHOS 74 06/17/2024 04:57 AM    AST 26 06/17/2024 04:57 AM    ALT 9 06/17/2024 04:57 AM       POC Tests: No results for input(s): \"POCGLU\", \"POCNA\", \"POCK\", \"POCCL\", \"POCBUN\", \"POCHEMO\", \"POCHCT\" in the last 72 hours.    Coags:   Lab Results   Component Value Date/Time    INR 1.06 06/17/2024 04:57 AM    APTT 27.7 06/17/2024 04:57 AM       HCG (If Applicable): No results found for: \"PREGTESTUR\", \"PREGSERUM\", \"HCG\", \"HCGQUANT\"     ABGs: No results found for: \"PHART\", \"PO2ART\", \"NEK8FIM\", \"OKI7ZPZ\", \"BEART\", \"M8ZDHPAC\"     Type & Screen (If Applicable):  No results found for: \"LABABO\"    Drug/Infectious Status (If Applicable):  No results found for: \"HIV\", \"HEPCAB\"    COVID-19 Screening (If Applicable): No results found for: \"COVID19\"        Anesthesia Evaluation    Airway:

## 2024-06-24 NOTE — FLOWSHEET NOTE
[facility]  PHYSICAL THERAPY MISSED TREATMENT NOTE  [facility] ENDOSCOPY    Date: 2024  Patient Name: Erin Hanna        MRN: [id number]   : 1945  (79 y.o.)  Gender: female   Referring Practitioner: Gifty Castle APRN - CNP  Diagnosis: STEMI         REASON FOR MISSED TREATMENT:  Missed Treat.    Patient off the unit at procedure. Will try back later as time allows.

## 2024-06-24 NOTE — PROGRESS NOTES
EBUS completed, pt tolerated well. Photos taken. 4 specimen jars and slides taken to lab per pathologist.  Scope .

## 2024-06-24 NOTE — PROGRESS NOTES
[facility]  [facility] CCU-STEPDOWN 3B  Occupational Therapy  Daily Note  Time:   Time In: 810  Time Out: 833  Timed Code Treatment Minutes: 23 Minutes  Minutes: 23          Date: 2024  Patient Name: Erin Hanna,   Gender: female      Room: [facility]25/025-A  MRN: [id number]  : 1945  (79 y.o.)  Referring Practitioner: Gifty Castle APRN - CNP  Diagnosis: STEMI  Additional Pertinent Hx: Per EMR: 89-year-old female with history of hypertension and CKD 3A presenting with worsening shortness of breath.  This been going on for the past few days.  Woke up very early this morning with severe shortness of breath.  Denied any chest pain/arm pain/jaw pain at that time.  Denied palpitations.  Denied nausea or vomiting.  Was brought to the ED, EKG showed inferolateral ST elevations, elevated troponin at 136.  No prior history of stents.  Interventional cardiology consulted, patient taken for emergent cath, PCI w/ SANTA x 3 to RCA.    Restrictions/Precautions:  Restrictions/Precautions: Fall Risk     Social/Functional History:  Lives With: Alone  Type of Home: House (Duplex)  Home Layout: Two level (Daughter lives on 1st floor, patient prefers the 2nd story)  Home Access: Stairs to enter without rails  Entrance Stairs - Number of Steps: 3 JORDYN without handrails, but planning to install them. Full flight of steps to 2nd story with 1 handrail.  Home Equipment: None   Bathroom Shower/Tub: Tub/Shower unit  Bathroom Toilet: Standard  Bathroom Equipment: None       ADL Assistance: Independent  Homemaking Assistance: Independent  Homemaking Responsibilities: Yes (Takes care of her own housekeeping needs.)  Ambulation Assistance: Independent  Transfer Assistance: Independent    Active : Yes     Additional Comments: Patient reporting she was IND PTA without use of an AD, was not on oxygen PTA. Patient states her daughter works during the day but has granddtr here from [place] and thinking about staying

## 2024-06-24 NOTE — ANESTHESIA POSTPROCEDURE EVALUATION
Department of Anesthesiology  Postprocedure Note    Patient: Erin Hanna  MRN: [id number]  YOB: 1945  Date of evaluation: 6/24/2024    Procedure Summary       Date: 06/24/24 Room / Location: [place] [id number] / [facility]    Anesthesia Start: 1127 Anesthesia Stop: 1207    Procedure: BRONCHOSCOPY ENDOBRONCHIAL ULTRASOUND Diagnosis:       Shortness of breath      (Shortness of breath [R06.02])    Surgeons: Tone Lugo DO Responsible Provider: Favio Gorman MD    Anesthesia Type: general ASA Status: 4            Anesthesia Type: No value filed.    José Antonio Phase I: José Antonio Score: 8    José Antonio Phase II:      Anesthesia Post Evaluation    Patient location during evaluation: PACU  Patient participation: complete - patient participated  Level of consciousness: awake  Airway patency: patent  Nausea & Vomiting: no vomiting and no nausea  Cardiovascular status: hemodynamically stable  Respiratory status: acceptable and nasal cannula  Hydration status: stable  Pain management: adequate    No notable events documented.

## 2024-06-24 NOTE — PROGRESS NOTES
IntraVENous Q8H    ALPRAZolam  0.25 mg Oral Once    metoprolol succinate  25 mg Oral Daily    sodium chloride flush  5-40 mL IntraVENous 2 times per day    aspirin  81 mg Oral Daily    ticagrelor  90 mg Oral BID    [Held by provider] metoprolol succinate  25 mg Oral Daily    atorvastatin  40 mg Oral Nightly     PRN Meds: sodium chloride flush, sodium chloride, albuterol sulfate HFA, potassium chloride **OR** potassium alternative oral replacement **OR** potassium chloride, potassium chloride, nitroGLYCERIN, ipratropium, sodium chloride flush, sodium chloride, acetaminophen      Intake/Output Summary (Last 24 hours) at 6/24/2024 0751  Last data filed at 6/23/2024 2231  Gross per 24 hour   Intake 143.91 ml   Output 250 ml   Net -106.09 ml       Diet:  Diet NPO Exceptions are: Sips of Water with Meds    Physical Exam:  /72   Pulse 72   Temp 98.6 °F (37 °C)   Resp 16   Ht 1.6 m (5' 3\")   Wt 45.5 kg (100 lb 5 oz)   SpO2 98%   BMI 17.77 kg/m²   General appearance: No apparent distress, appears stated age and cooperative.  HEENT: Pupils equal, round, and reactive to light. Conjunctivae/corneas clear.  Neck: Supple, with full range of motion. No jugular venous distention. Trachea midline.  Respiratory:  Decreased breath sounds b/l.No wheezing or rales heard.  Cardiovascular: Regular rate and rhythm with normal S1/S2 without murmurs, rubs or gallops.  Abdomen: Soft, non-tender, non-distended with normal bowel sounds.  Musculoskeletal: passive and active ROM x 4 extremities.  Skin: Skin color, texture, turgor normal.  No rashes or lesions.  Neurologic:  Neurovascularly intact without any focal sensory/motor deficits. Cranial nerves: II-XII intact, grossly non-focal.  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 Mildly increased   thoracic kyphosis.   3. Mild interstitial pneumonitis/fibrosis right lung base. Prior   cholecystectomy.         **This report has been created using voice recognition software.  It may contain   minor errors which are inherent in voice recognition technology.**            Electronically signed by Dr. Johnny Gutierres      XR CHEST PORTABLE   Final Result   1. Borderline heart size. Soft tissue fullness projected over the right side of   the cardiac silhouette, probably due to a moderate size hiatus hernia.   2. No acute infiltrates or effusions are seen. Prior cholecystectomy.            **This report has been created using voice recognition software.  It may contain   minor errors which are inherent in voice recognition technology.**      Electronically signed by Dr. Johnny Gutierres      US RENAL COMPLETE   Final Result   1. Left renal cortical thinning and atrophy.   2. Mildly elevated bilateral arcuate resistive indices.            Electronically signed by Dr. Tyrese Sarah      XR CHEST PORTABLE   Final Result   1. Borderline heart size. No effusion. Apparent prior cholecystectomy.   2. Abnormal increased density right cardiophrenic sulcus. Possibilities include   pneumonia versus mass lesion.   3. CT thorax recommended for further evaluation.      **This report has been created using voice recognition software.  It may contain   minor errors which are inherent in voice recognition technology.**      Electronically signed by Dr. Johnny Gutierres        Echo (TTE) complete (PRN contrast/bubble/strain/3D)    Result Date: 6/18/2024    Left Ventricle: Mildly reduced left ventricular systolic function with a visually estimated EF of 40 - 45%. Left ventricle size is normal. Normal wall thickness. Mild global hypokinesis present. Grade I diastolic dysfunction with normal LAP.   Aortic Valve: Trileaflet valve. Moderately thickened cusp. Moderately calcified cusp.

## 2024-06-24 NOTE — PROGRESS NOTES
[facility]    Patient - Erin Hanna   MRN -  [id number]   [id number]   - 1945      Date of Admission -  2024  4:37 AM  Date of evaluation -  2024  Room - 3B--A   Hospital Day - 7  Consulting - Heidi Thomas MD Primary Care Physician - Claudine Gutierrez APRN - DWAYNE   Chief complaint/ Reason for Admission/ Consult:   Increased SOB, right LL mass   HPI   History obtained from Patient and EMR    Erin Hanna is a 79 y.o. female admitted for STEMI . S/p PCI to RCA . Ischemic cardiomyopathy.  -ECHO done showed EF of 40-45%  Pneumonia panel positive for  staph aureus.  -As per pharmacy its MSSA so vancomycin was not given and patient continued on Augmentin.  -Patient reports chronic daily cough, currently worse.  At home would expel little phlegm in mornings, now expelling blood tinged sputum frequently   Denies pain   No personal history of cancer .   History of COPD, diagnosed many years ago . On home inhalers, she can not recall names,prescribed by primary care.   -Antibiotics to Zosyn as patient had poor response to Augmentin  -CT  chest.done showed 8.3 x 5.6 x 6 cm mass in the right lower lobe.Diffuse COPD and thickening of the interstitial lung markings.  -Pulmonology consulted.  Occupation: retired.      Erin Hanna denies history of exposure to Asbestos or Silicone.She denies history of exposure to coal, foundry, quarry, or significant farm dust exposure.  Patient denies any recent travel history.  Patient denies history of exposure to birds, pigeons, or chickens in the past. The patient denies pet animals at home. She had none  in the home.  She  denies to TB exposure in the past.  She denies to history of recreational or IV drug use.       Subjective/Events Past 24 hours/ROS   -She is on oxygen 2 L/min by nasal cannula.  -No chest pain  -She was seen and evaluated by Dr. Tone Rosales, DO yesterday on 2024,    6/18/24    Left Ventricle: Mildly reduced left ventricular systolic function with a visually estimated EF of 40 - 45%. Left ventricle size is normal. Normal wall thickness. Mild global hypokinesis present. Grade I diastolic dysfunction with normal LAP.    Aortic Valve: Trileaflet valve. Moderately thickened cusp. Moderately calcified cusp.    Mitral Valve: Moderately thickened leaflet, at the posterior leaflet. Moderately calcified leaflet, at the posterior leaflet.    Tricuspid Valve: Mild regurgitation. The estimated RVSP is 61 mmHg. The estimated RVSP is 61 mmHg.    Left Atrium: Left atrium is severely dilated.    Right Atrium: Right atrium is moderately dilated.    IVC/SVC: IVC diameter is greater than 21 mm and decreases less than 50% during inspiration; therefore the estimated right atrial pressure is elevated (~15 mmHg). IVC is dilated.    Image quality is technically difficult. Technically difficult study due to patient's heart rhythm and procedure performed with the patient in a sitting position.     Radiology    CXR    CT Scans  PROCEDURE: CT CHEST WO CONTRAST  COMPARISON: Chest x-ray dated 6/22/2024..    There is mild cardiomegaly. There is mitral valve calcification. There is a  probable stent in the right coronary artery.. There is atherosclerotic  calcification in the thoracic aorta.. There is no gross abnormality of the  pulmonary artery. There is no mediastinal, axillary or hilar adenopathy. There  is no pericardial or pleural effusion.      There is diffuse COPD and thickening of the interstitial lung markings. There is  a superimposed mass in the right lower lobe measuring 8.3 x 5.6 x 6 cm in size.     There is thoracic spondylosis. . There is a mild compression deformity involving  the T10 vertebral body.     IMPRESSION:     1. 8.3 x 5.6 x 6 cm mass in the right lower lobe.  2. Diffuse COPD and thickening of the interstitial lung markings.  3. Mild cardiomegaly. Mitral valve calcification.

## 2024-06-24 NOTE — CARE COORDINATION
6/24/24, 1:36 PM EDT    DISCHARGE ON GOING EVALUATION    Erin Hanna       Hospital day: 7  Location: -25/025- Reason for admit: STEMI (ST elevation myocardial infarction) (HCC) [I21.3]  ST elevation myocardial infarction (STEMI), unspecified artery (HCC) [I21.3]     Procedures:   6/17 [facility] with Dr. Fuller: Successful PCI of RCA STEMI using OCT guidance followed by 3 overlapping SANTA.   6/18 Echo: EF 40-45%. Mild global hypokinesis of left ventricle. Aortic valve: Moderately thickened and calcified cusp. Mitral valve: Moderately thickened and calcified leaflet, at the posterior leaflet. Left atrium: severely dilated. Right atrium: moderately dilated.   6/24 EBUS with Dr. Lugo: planned.     Imaging since last note:   6/20 CXR:   1. Normal heart size. Relatively large hiatus hernia versus posterior  mediastinal mass. CT thorax would be helpful for further evaluation.  2.. 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 Mildly increased  thoracic kyphosis.  3. Mild interstitial pneumonitis/fibrosis right lung base. Prior  cholecystectomy.  6/21 CT chest:   1. 8.3 x 5.6 x 6 cm mass in the right lower lobe.  2. Diffuse COPD and thickening of the interstitial lung markings.  3. Mild cardiomegaly. Mitral valve calcification. Probable stent in the right  coronary artery.  4. Thoracic spondylosis. Mild compression deformity involving the T10 vertebral  body.    Barriers to Discharge: Transferred from  last week to . Hospitalist, Pulmonology, Interventional Pulmonology and Cardiology following. Pt was found to have a mass in the right lung. Planning EBUS today with biopsy. Zosyn iv q8hr. PT/OT.     PCP: Claudine Gutierrez APRN - CNP  Readmission Risk Score: 6.4    Patient Goals/Plan/Treatment Preferences: From home alone. Family lives in attached duplex. Pt has been independent. Therapy recommending Rehab stay, SW consulted and making referrals.

## 2024-06-24 NOTE — PLAN OF CARE
Problem: Discharge Planning  Goal: Discharge to home or other facility with appropriate resources  Outcome: Progressing  Flowsheets (Taken 6/23/2024 2235)  Discharge to home or other facility with appropriate resources:   Identify barriers to discharge with patient and caregiver   Identify discharge learning needs (meds, wound care, etc)     Problem: ABCDS Injury Assessment  Goal: Absence of physical injury  Outcome: Progressing  Flowsheets (Taken 6/23/2024 2235)  Absence of Physical Injury: Implement safety measures based on patient assessment     Problem: Safety - Adult  Goal: Free from fall injury  Outcome: Progressing  Flowsheets (Taken 6/23/2024 2235)  Free From Fall Injury: Instruct family/caregiver on patient safety       Problem: Cardiovascular - Adult  Goal: Maintains optimal cardiac output and hemodynamic stability  Outcome: Progressing  Flowsheets (Taken 6/22/2024 0146)  Maintains optimal cardiac output and hemodynamic stability: Monitor blood pressure and heart rate     Problem: Skin/Tissue Integrity - Adult  Goal: Skin integrity remains intact  Outcome: Progressing  Flowsheets (Taken 6/23/2024 2235)  Skin Integrity Remains Intact: Monitor for areas of redness and/or skin breakdown     Problem: Pain  Goal: Verbalizes/displays adequate comfort level or baseline comfort level  Outcome: Progressing  Flowsheets (Taken 6/22/2024 2253)  Verbalizes/displays adequate comfort level or baseline comfort level:   Assess pain using appropriate pain scale   Encourage patient to monitor pain and request assistance     Problem: Skin/Tissue Integrity  Goal: Absence of new skin breakdown  Description: 1.  Monitor for areas of redness and/or skin breakdown  2.  Assess vascular access sites hourly  3.  Every 4-6 hours minimum:  Change oxygen saturation probe site  4.  Every 4-6 hours:  If on nasal continuous positive airway pressure, respiratory therapy assess nares and determine need for appliance change or resting  period.  Outcome: Progressing     Problem: Chronic Conditions and Co-morbidities  Goal: Patient's chronic conditions and co-morbidity symptoms are monitored and maintained or improved  Outcome: Progressing  Flowsheets (Taken 6/22/2024 2251)  Care Plan - Patient's Chronic Conditions and Co-Morbidity Symptoms are Monitored and Maintained or Improved: Monitor and assess patient's chronic conditions and comorbid symptoms for stability, deterioration, or improvement     Problem: Nutrition Deficit:  Goal: Optimize nutritional status  Outcome: Progressing  Flowsheets (Taken 6/23/2024 2231)  Nutrient intake appropriate for improving, restoring, or maintaining nutritional needs: Assess nutritional status and recommend course of action

## 2024-06-24 NOTE — PLAN OF CARE
Problem: Discharge Planning  Goal: Discharge to home or other facility with appropriate resources  6/24/2024 1215 by Maia Manning RN  Outcome: Progressing  Flowsheets (Taken 6/23/2024 2235 by Kimberly Pardo RN)  Discharge to home or other facility with appropriate resources:   Identify barriers to discharge with patient and caregiver   Identify discharge learning needs (meds, wound care, etc)  Note: She is from home and plans on possibly going to an ECF at discharge.    6/23/2024 2235 by Kimberly Pardo RN  Outcome: Progressing  Flowsheets (Taken 6/23/2024 2235)  Discharge to home or other facility with appropriate resources:   Identify barriers to discharge with patient and caregiver   Identify discharge learning needs (meds, wound care, etc)     Problem: ABCDS Injury Assessment  Goal: Absence of physical injury  6/24/2024 1215 by Maia Manning RN  Outcome: Progressing  Flowsheets (Taken 6/23/2024 2235 by Kimberly Pardo RN)  Absence of Physical Injury: Implement safety measures based on patient assessment  Note: Bed locked & in low position, call light in reach, side-rails up x2, bed/chair alarm utilized, non-slip socks on when ambulating, reminded patient to use call light to call for assistance.    6/23/2024 2235 by Kimberly Pardo RN  Outcome: Progressing  Flowsheets (Taken 6/23/2024 2235)  Absence of Physical Injury: Implement safety measures based on patient assessment     Problem: Safety - Adult  Goal: Free from fall injury  6/24/2024 1215 by Maia Manning RN  Outcome: Progressing  Flowsheets (Taken 6/23/2024 2235 by Kimberly Pardo RN)  Free From Fall Injury: Instruct family/caregiver on patient safety  Note: Bed locked & in low position, call light in reach, side-rails up x2, bed/chair alarm utilized, non-slip socks on when ambulating, reminded patient to use call light to call for assistance.    6/23/2024 2235 by Kimberly Pardo RN  Outcome: Progressing  Flowsheets (Taken 6/23/2024  Plan - Patient's Chronic Conditions and Co-Morbidity Symptoms are Monitored and Maintained or Improved: Monitor and assess patient's chronic conditions and comorbid symptoms for stability, deterioration, or improvement     Problem: Nutrition Deficit:  Goal: Optimize nutritional status  6/23/2024 2235 by Kimberly Pardo RN  Outcome: Progressing  Flowsheets (Taken 6/23/2024 2235)  Nutrient intake appropriate for improving, restoring, or maintaining nutritional needs: Assess nutritional status and recommend course of action     Care plan reviewed with patient.  Patient verbalizes understanding of the care plan and contributed to goal setting.

## 2024-06-24 NOTE — RT PROTOCOL NOTE
RT Inhaler-Nebulizer Bronchodilator Protocol Note    There is a bronchodilator order in the chart from a provider indicating to follow the RT Bronchodilator Protocol and there is an “Initiate RT Inhaler-Nebulizer Bronchodilator Protocol” order as well (see protocol at bottom of note).    CXR Findings:  No results found.    The findings from the last RT Protocol Assessment were as follows:   History Pulmonary Disease: Chronic pulmonary disease  Respiratory Pattern: Regular pattern and RR 12-20 bpm  Breath Sounds: Clear breath sounds  Cough: Strong, spontaneous, non-productive  Indication for Bronchodilator Therapy: Decreased or absent breath sounds  Bronchodilator Assessment Score: 2    Aerosolized bronchodilator medication orders have been revised according to the RT Inhaler-Nebulizer Bronchodilator Protocol below.    Respiratory Therapist to perform RT Therapy Protocol Assessment initially then follow the protocol.  Repeat RT Therapy Protocol Assessment PRN for score 0-3 or on second treatment, BID, and PRN for scores above 3.    No Indications - adjust the frequency to every 6 hours PRN wheezing or bronchospasm, if no treatments needed after 48 hours then discontinue using Per Protocol order mode.     If indication present, adjust the RT bronchodilator orders based on the Bronchodilator Assessment Score as indicated below.  Use Inhaler orders unless patient has one or more of the following: on home nebulizer, not able to hold breath for 10 seconds, is not alert and oriented, cannot activate and use MDI correctly, or respiratory rate 25 breaths per minute or more, then use the equivalent nebulizer order(s) with same Frequency and PRN reasons based on the score.  If a patient is on this medication at home then do not decrease Frequency below that used at home.    0-3 - enter or revise RT bronchodilator order(s) to equivalent RT Bronchodilator order with Frequency of every 4 hours PRN for wheezing or increased work  of breathing using Per Protocol order mode.        4-6 - enter or revise RT Bronchodilator order(s) to two equivalent RT bronchodilator orders with one order with BID Frequency and one order with Frequency of every 4 hours PRN wheezing or increased work of breathing using Per Protocol order mode.        7-10 - enter or revise RT Bronchodilator order(s) to two equivalent RT bronchodilator orders with one order with TID Frequency and one order with Frequency of every 4 hours PRN wheezing or increased work of breathing using Per Protocol order mode.       11-13 - enter or revise RT Bronchodilator order(s) to one equivalent RT bronchodilator order with QID Frequency and an Albuterol order with Frequency of every 4 hours PRN wheezing or increased work of breathing using Per Protocol order mode.      Greater than 13 - enter or revise RT Bronchodilator order(s) to one equivalent RT bronchodilator order with every 4 hours Frequency and an Albuterol order with Frequency of every 2 hours PRN wheezing or increased work of breathing using Per Protocol order mode.     RT to enter RT Home Evaluation for COPD & MDI Assessment order using Per Protocol order mode.    Electronically signed by Brayan Hung RCP on 6/24/2024 at 7:45 AM

## 2024-06-24 NOTE — PROGRESS NOTES
1207- pt to pacu. Pt drowsy    1216- pt resting in bed eyes closed, denies complaint.    1224- pt continues to deny complaint. VSS    1231- Report called to 3B nurse. Transport requested.    1237- pt meets criteria for discharge from pacu. Pt continues to deny complaint.    1304-Transport arrives to return pt to 3B in stable condition.

## 2024-06-24 NOTE — PROGRESS NOTES
[facility]  INPATIENT PHYSICAL THERAPY  DAILY NOTE  [facility] CCU-STEPDOWN 3B - 3B-25/025-A    Time In: 1426  Time Out: 1442  Timed Code Treatment Minutes: 16 Minutes  Minutes: 16          Date: 2024  Patient Name: Erin Hanna,  Gender:  female        MRN: [id number]  : 1945  (79 y.o.)     Referring Practitioner: Gifty Castle APRN - CNP  Diagnosis: STEMI  Additional Pertinent Hx: Per EMR: 89-year-old female with history of hypertension and CKD 3A presenting with worsening shortness of breath.  This been going on for the past few days.  Woke up very early this morning with severe shortness of breath.  Denied any chest pain/arm pain/jaw pain at that time.  Denied palpitations.  Denied nausea or vomiting.  Was brought to the ED, EKG showed inferolateral ST elevations, elevated troponin at 136.  No prior history of stents.  Interventional cardiology consulted, patient taken for emergent cath, PCI w/ SANTA x 3 to RCA.     Prior Level of Function:  Lives With: Alone  Type of Home: House (Duplex)  Home Layout: Two level (Daughter lives on 1st floor, patient prefers the 2nd story)  Home Access: Stairs to enter without rails  Entrance Stairs - Number of Steps: 3 JORDYN without handrails, but planning to install them. Full flight of steps to 2nd story with 1 handrail.  Home Equipment: None   Bathroom Shower/Tub: Tub/Shower unit  Bathroom Toilet: Standard  Bathroom Equipment: None    ADL Assistance: Independent  Homemaking Assistance: Independent  Homemaking Responsibilities: Yes (Takes care of her own housekeeping needs.)  Ambulation Assistance: Independent  Transfer Assistance: Independent  Active : Yes  Additional Comments: Patient reporting she was IND PTA without use of an AD, was not on oxygen PTA. Patient states her daughter works during the day but has granddtr here from [place] and thinking about staying for the summer to assist if  needed.    Restrictions/Precautions:  Restrictions/Precautions: Fall Risk     SUBJECTIVE: RN approved session. Patient laying in bed upon arrival and agreeable to therapy. Patient reports feeling drowsy from procedure.     PAIN: denies pain, reports stiffness    Vitals: Oxygen: on 1L O2 nasal cannula    OBJECTIVE:  Bed Mobility:  Supine to Sit: Stand By Assistance  Sit to Supine: Stand By Assistance     Transfers:  Sit to Stand: Stand By Assistance  Stand to Sit:Stand By Assistance    Ambulation:  Contact Guard Assistance  Distance: 6ft around patient's room  Surface: Level Tile  Device:Rolling Walker  Gait Deviations:  Forward Flexed Posture, Slow Marcella, Decreased Step Length Bilaterally, Decreased Gait Speed, and Mild Path Deviations    Balance:  Static Sitting Balance:  Supervision    Exercise:  Patient was guided in 1 set(s) 10 reps of exercise to both lower extremities.  Ankle pumps, Heelslides, Hip abduction/adduction, and Straight leg raises.  Exercises were completed for increased independence with functional mobility.    Functional Outcome Measures:  [facility] (6 CLICK) BASIC MOBILITY  AM-PAC Inpatient Mobility Raw Score : 17  AM-PAC Inpatient T-Scale Score : 42.13  Mobility Inpatient CMS 0-100% Score: 50.57  Mobility Inpatient CMS G-Code Modifier : CK        Modified [place] Scale:  Not Applicable    ASSESSMENT:  Assessment: Patient progressing toward established goals.  Activity Tolerance:  Patient tolerance of  treatment: fair.      Equipment Recommendations:Equipment Needed: Yes (would benefit from RW at discharge)  Discharge Recommendations: Subacte/Skilled Nursing Facility  Plan: General Plan:  (5x)    Education  Education:  Learners: Patient  Patient Education: Plan of Care, Bed Mobility, Transfers, Gait, Up in Chair for All Meals, Verbal Exercise Instruction    Goals:  Patient Goals : Pt goal to return home  Short Term Goals  Time Frame for Short Term Goals: By discharge  Short Term Goal 1: Supine

## 2024-06-25 LAB
EKG ATRIAL RATE: 74 BPM
EKG P AXIS: 70 DEGREES
EKG P-R INTERVAL: 116 MS
EKG Q-T INTERVAL: 382 MS
EKG QRS DURATION: 84 MS
EKG QTC CALCULATION (BAZETT): 424 MS
EKG R AXIS: -24 DEGREES
EKG T AXIS: 67 DEGREES
EKG VENTRICULAR RATE: 74 BPM

## 2024-06-25 PROCEDURE — 6360000002 HC RX W HCPCS: Performed by: INTERNAL MEDICINE

## 2024-06-25 PROCEDURE — 6370000000 HC RX 637 (ALT 250 FOR IP): Performed by: NURSE PRACTITIONER

## 2024-06-25 PROCEDURE — 97530 THERAPEUTIC ACTIVITIES: CPT

## 2024-06-25 PROCEDURE — 99232 SBSQ HOSP IP/OBS MODERATE 35: CPT | Performed by: INTERNAL MEDICINE

## 2024-06-25 PROCEDURE — 6370000000 HC RX 637 (ALT 250 FOR IP): Performed by: INTERNAL MEDICINE

## 2024-06-25 PROCEDURE — 2140000000 HC CCU INTERMEDIATE R&B

## 2024-06-25 PROCEDURE — 97535 SELF CARE MNGMENT TRAINING: CPT

## 2024-06-25 PROCEDURE — 93010 ELECTROCARDIOGRAM REPORT: CPT | Performed by: NUCLEAR MEDICINE

## 2024-06-25 PROCEDURE — 97116 GAIT TRAINING THERAPY: CPT

## 2024-06-25 PROCEDURE — 93005 ELECTROCARDIOGRAM TRACING: CPT | Performed by: INTERNAL MEDICINE

## 2024-06-25 PROCEDURE — 2580000003 HC RX 258: Performed by: INTERNAL MEDICINE

## 2024-06-25 PROCEDURE — 6370000000 HC RX 637 (ALT 250 FOR IP)

## 2024-06-25 RX ORDER — IPRATROPIUM BROMIDE AND ALBUTEROL SULFATE 2.5; .5 MG/3ML; MG/3ML
1 SOLUTION RESPIRATORY (INHALATION) EVERY 4 HOURS PRN
Status: DISCONTINUED | OUTPATIENT
Start: 2024-06-25 | End: 2024-06-26

## 2024-06-25 RX ORDER — GUAIFENESIN 600 MG/1
600 TABLET, EXTENDED RELEASE ORAL 2 TIMES DAILY
Status: DISCONTINUED | OUTPATIENT
Start: 2024-06-25 | End: 2024-07-06 | Stop reason: HOSPADM

## 2024-06-25 RX ADMIN — ACETAMINOPHEN 650 MG: 325 TABLET ORAL at 03:44

## 2024-06-25 RX ADMIN — ASPIRIN 81 MG 81 MG: 81 TABLET ORAL at 09:41

## 2024-06-25 RX ADMIN — TICAGRELOR 90 MG: 90 TABLET ORAL at 09:41

## 2024-06-25 RX ADMIN — FLUTICASONE PROPIONATE 1 SPRAY: 50 SPRAY, METERED NASAL at 09:41

## 2024-06-25 RX ADMIN — METOPROLOL SUCCINATE 50 MG: 50 TABLET, EXTENDED RELEASE ORAL at 09:41

## 2024-06-25 RX ADMIN — ATORVASTATIN CALCIUM 40 MG: 40 TABLET, FILM COATED ORAL at 20:03

## 2024-06-25 RX ADMIN — PIPERACILLIN AND TAZOBACTAM 3375 MG: 3; .375 INJECTION, POWDER, FOR SOLUTION INTRAVENOUS at 22:36

## 2024-06-25 RX ADMIN — PIPERACILLIN AND TAZOBACTAM 3375 MG: 3; .375 INJECTION, POWDER, FOR SOLUTION INTRAVENOUS at 05:52

## 2024-06-25 RX ADMIN — PIPERACILLIN AND TAZOBACTAM 3375 MG: 3; .375 INJECTION, POWDER, FOR SOLUTION INTRAVENOUS at 13:58

## 2024-06-25 RX ADMIN — ACETAMINOPHEN 650 MG: 325 TABLET ORAL at 17:43

## 2024-06-25 RX ADMIN — SODIUM CHLORIDE, PRESERVATIVE FREE 10 ML: 5 INJECTION INTRAVENOUS at 20:03

## 2024-06-25 RX ADMIN — GUAIFENESIN 600 MG: 600 TABLET, EXTENDED RELEASE ORAL at 13:59

## 2024-06-25 RX ADMIN — TICAGRELOR 90 MG: 90 TABLET ORAL at 20:03

## 2024-06-25 RX ADMIN — GUAIFENESIN 600 MG: 600 TABLET, EXTENDED RELEASE ORAL at 20:03

## 2024-06-25 RX ADMIN — SODIUM CHLORIDE, PRESERVATIVE FREE 10 ML: 5 INJECTION INTRAVENOUS at 09:42

## 2024-06-25 ASSESSMENT — PAIN SCALES - GENERAL
PAINLEVEL_OUTOF10: 3
PAINLEVEL_OUTOF10: 0
PAINLEVEL_OUTOF10: 3
PAINLEVEL_OUTOF10: 0
PAINLEVEL_OUTOF10: 5
PAINLEVEL_OUTOF10: 0

## 2024-06-25 ASSESSMENT — PAIN DESCRIPTION - LOCATION
LOCATION: BACK
LOCATION: CHEST

## 2024-06-25 ASSESSMENT — PAIN DESCRIPTION - PAIN TYPE
TYPE: ACUTE PAIN
TYPE: ACUTE PAIN

## 2024-06-25 ASSESSMENT — PAIN - FUNCTIONAL ASSESSMENT
PAIN_FUNCTIONAL_ASSESSMENT: ACTIVITIES ARE NOT PREVENTED
PAIN_FUNCTIONAL_ASSESSMENT: PREVENTS OR INTERFERES SOME ACTIVE ACTIVITIES AND ADLS

## 2024-06-25 ASSESSMENT — PAIN DESCRIPTION - FREQUENCY
FREQUENCY: INTERMITTENT
FREQUENCY: CONTINUOUS

## 2024-06-25 ASSESSMENT — PAIN DESCRIPTION - ORIENTATION
ORIENTATION: ANTERIOR
ORIENTATION: MID;LOWER

## 2024-06-25 ASSESSMENT — PAIN DESCRIPTION - DESCRIPTORS
DESCRIPTORS: ACHING
DESCRIPTORS: ACHING

## 2024-06-25 ASSESSMENT — PAIN DESCRIPTION - ONSET
ONSET: ON-GOING
ONSET: ON-GOING

## 2024-06-25 NOTE — PLAN OF CARE
Problem: Discharge Planning  Goal: Discharge to home or other facility with appropriate resources  Outcome: Progressing  Flowsheets (Taken 6/24/2024 1330 by Jodee Fried, RN)  Discharge to home or other facility with appropriate resources: Identify barriers to discharge with patient and caregiver     Problem: ABCDS Injury Assessment  Goal: Absence of physical injury  Outcome: Progressing  Flowsheets (Taken 6/23/2024 2235 by Kimberly Pardo, RN)  Absence of Physical Injury: Implement safety measures based on patient assessment     Problem: Safety - Adult  Goal: Free from fall injury  Outcome: Progressing  Flowsheets (Taken 6/23/2024 2235 by Kimberly Pardo, RN)  Free From Fall Injury: Instruct family/caregiver on patient safety     Problem: Cardiovascular - Adult  Goal: Maintains optimal cardiac output and hemodynamic stability  Outcome: Progressing  Flowsheets (Taken 6/24/2024 1330 by Jodee Fried RN)  Maintains optimal cardiac output and hemodynamic stability: Monitor blood pressure and heart rate  Goal: Absence of cardiac dysrhythmias or at baseline  Outcome: Progressing  Flowsheets (Taken 6/24/2024 1330 by Jodee Fried RN)  Absence of cardiac dysrhythmias or at baseline: Monitor cardiac rate and rhythm     Problem: Skin/Tissue Integrity - Adult  Goal: Skin integrity remains intact  Outcome: Progressing  Flowsheets (Taken 6/24/2024 1330 by Jodee Fried, RN)  Skin Integrity Remains Intact: Monitor for areas of redness and/or skin breakdown     Problem: Pain  Goal: Verbalizes/displays adequate comfort level or baseline comfort level  Outcome: Progressing  Flowsheets (Taken 6/22/2024 2253 by Kimberly Pardo, RN)  Verbalizes/displays adequate comfort level or baseline comfort level:   Assess pain using appropriate pain scale   Encourage patient to monitor pain and request assistance     Problem: Skin/Tissue Integrity  Goal: Absence of new skin breakdown  Description: 1.  Monitor for areas of

## 2024-06-25 NOTE — CARE COORDINATION
6/25/24, 2:23 PM EDT    DISCHARGE PLANNING EVALUATION    Spoke with Erin today regarding discharge plan.  She told SW that her family didn't come up with any other options for discharge.  She told SW that she is considering going home with help from the family.  Asked her to call her family and make sure she would have help from them at home.  Also gave her a home health list.   Post-acute (PAC) provider list was provided to patient. Patient was informed of their freedom to choose PAC provider. Discussed and offered to show the patient the relevant PAC Providers quality and resource use measures on Medicare Compare web site via computer based on patient's goals of care and treatment preferences. Questions regarding selection process were answered.   Will stop back tomorrow to verify a plan for discharge after she has a chance to speak with her family.

## 2024-06-25 NOTE — PROGRESS NOTES
Comprehensive Nutrition Assessment    Type and Reason for Visit:  Reassess    Nutrition Recommendations/Plan:   Continue current diet - send ground meats per 6/20 request.  Resume Ensure Plus TID.  Question if pt. Would benefit from appetite stimulant (if ongoing poor intake).  Encouraged po, good nutrition at best efforts.  6/20  Encouraged small, frequent meals. Discussed appropriate use of ONS at discharge. Encouraged adequate nutrition at top priority but did encourage pt. To limit added salt, fried foods, etc.      Malnutrition Assessment:  Malnutrition Status:  Severe malnutrition (06/20/24 1216)    Context:  Acute Illness     Findings of the 6 clinical characteristics of malnutrition:  Energy Intake:  50% or less of estimated energy requirements for 5 or more days  Weight Loss:   (reports ~5.3% in few months (u/a confirm))     Body Fat Loss:  Moderate body fat loss Orbital, Triceps, Fat Overlying Ribs, Buccal region   Muscle Mass Loss:  Moderate muscle mass loss Temples (temporalis), Clavicles (pectoralis & deltoids), Hand (interosseous)  Fluid Accumulation:  Unable to assess     Strength:  Not Performed    Nutrition Assessment:     Pt. severely malnourished AEB criteria listed above.  At risk for further nutritional compromise r/t admit s/p STEMI, PCI and underlying medical condition (hx HTN, CKD).       Nutrition Related Findings:    Pt. Report/Treatments/Miscellaneous:   6/25: pt. Seen - states had been eating a little better but decreased intake since yesterday (post-procedure); states increased coughing since biopsy 6/24; RN to give Mucinex; states acceptance of Ensure  6/20: pt. Seen - reports poor appetite and intake for the past few weeks; states has never been a big eater overall; reports usually drinks coffee for breakfast, has a sandwich for lunch and hot meal at dinner; admits to eating small portions; minimal po intake in hospital- breakfast tray essentially untouched; denies any N/V; reports

## 2024-06-25 NOTE — PROGRESS NOTES
[facility]  STRZ CCU-STEPDOWN 3B  Occupational Therapy  Daily Note  Time:   Time In: 0750  Time Out: 828  Timed Code Treatment Minutes: 38 Minutes  Minutes: 38          Date: 2024  Patient Name: Erin Hanna,   Gender: female      Room: [facility]025-A  MRN: [id number]  : 1945  (79 y.o.)  Referring Practitioner: Gifty Castle APRN - CNP  Diagnosis: STEMI  Additional Pertinent Hx: Per EMR: 89-year-old female with history of hypertension and CKD 3A presenting with worsening shortness of breath.  This been going on for the past few days.  Woke up very early this morning with severe shortness of breath.  Denied any chest pain/arm pain/jaw pain at that time.  Denied palpitations.  Denied nausea or vomiting.  Was brought to the ED, EKG showed inferolateral ST elevations, elevated troponin at 136.  No prior history of stents.  Interventional cardiology consulted, patient taken for emergent cath, PCI w/ SANTA x 3 to RCA.    Restrictions/Precautions:  Restrictions/Precautions: Fall Risk     Social/Functional History:  Lives With: Alone  Type of Home: House (Duplex)  Home Layout: Two level (Daughter lives on 1st floor, patient prefers the 2nd story)  Home Access: Stairs to enter without rails  Entrance Stairs - Number of Steps: 3 JORDYN without handrails, but planning to install them. Full flight of steps to 2nd story with 1 handrail.  Home Equipment: None   Bathroom Shower/Tub: Tub/Shower unit  Bathroom Toilet: Standard  Bathroom Equipment: None       ADL Assistance: Independent  Homemaking Assistance: Independent  Homemaking Responsibilities: Yes (Takes care of her own housekeeping needs.)  Ambulation Assistance: Independent  Transfer Assistance: Independent    Active : Yes     Additional Comments: Patient reporting she was IND PTA without use of an AD, was not on oxygen PTA. Patient states her daughter works during the day but has granddtr here from [place] and thinking about staying

## 2024-06-25 NOTE — CARE COORDINATION
6/25/24, 1:26 PM EDT    DISCHARGE ON GOING EVALUATION    Erin Hanna       Hospital day: 8  Location: -25/025-A Reason for admit: STEMI (ST elevation myocardial infarction) (HCC) [I21.3]  ST elevation myocardial infarction (STEMI), unspecified artery (HCC) [I21.3]     Procedures:   6/17 [facility] with Dr. Fuller: Successful PCI of RCA STEMI using OCT guidance followed by 3 overlapping SANTA.   6/18 Echo: EF 40-45%. Mild global hypokinesis of left ventricle. Aortic valve: Moderately thickened and calcified cusp. Mitral valve: Moderately thickened and calcified leaflet, at the posterior leaflet. Left atrium: severely dilated. Right atrium: moderately dilated.   6/24 EBUS with Dr. Lugo: Endobronchial ultrasound with biopsy of mass and lymph nodes (3 nodes) and 1 mass.   Station 4L 1.5 cm - tbna x 3 - slick negative  Station 7 2 cm - tbna x 3 - slick lymphocytes  Station 4R - small < 1cm - no biopsy  Station 10 R - large 2.5 cm - SLICK: positive non-small  Right lower lobe mass large > 4cm - SLICK: positive non-small    Imaging since last note: None    Barriers to Discharge: Hospitalist, Cardiology and Pulmonology following. POD #1 EBUS with biopsies (see above). Pt had some NSVT yesterday, adjusted medications. Mucinex bid. Zosyn iv q8hr. PT/OT.     PCP: Claudine Gutierrez APRN - CNP  Readmission Risk Score: 6.4    Patient Goals/Plan/Treatment Preferences: From home alone. Family lives in attached duplex. Pt has been independent. Therapy recommending Rehab stay, SW consulted and making referrals.

## 2024-06-25 NOTE — PROCEDURES
PROCEDURE NOTE  Date: 6/25/2024   Name: Erin Hanna  YOB: 1945    Procedures  12 lead EKG completed. Results handed to Emili KLEIN. Carmen

## 2024-06-25 NOTE — PROGRESS NOTES
Hospitalist Progress Note      Patient:  Erin Hanna    Unit/Bed:3B-25/025-A  YOB: 1945  MRN: [id number]   Acct: [id number]   PCP: Claudine Gutierrez APRN - CNP  Date of Admission: 6/17/2024    Assessment/Plan:    #STEMI .S/P PCI TO RCA.  -Presented to ED with shortness of breath.   - EKG showing inferolateral ST elevations.  Troponin 136. -Interventional cardiology consulted.  S/p PCI of RCA w/ SANTA overlapping x 3.   -Cath also showed 50% Ost to LAD stenosis, 40% left circumflex stenossi, 40% RPDA stenosis, and 95% stenosis of RPAV.  -Continue  DAPT with aspirin and Brilinta and high intensity statin,beta blocker.  -Cardiology following.    #Ischemic cardiomyopathy.  -ECHO done showed EF of 40-45%  -Euvolemic at this time.  -Will resume home benazepril.Continue with beta blocker.  -Cardiology following.  .  #Bronchitis/Pneumonia  - Pneumonia panel positive for  staph aureus.  -As per pharmacy its MSSA so vancomycin was not given and patient continued on Augmentin.  -Patient reports worsening cough and SOB.  -Pneumonia panel positive for Staph aureus.  -Antibiotic changed to  Zosyn as Patient had poor response to Augmentin.  -CT  chest.done showed 8.3 x 5.6 x 6 cm mass in the right lower lobe.Diffuse COPD and thickening of the interstitial lung markings.  -Pulmonology consulted.  -S/P EBUS and biopsy.Pending report.    #ANNA on CKD IIIa.  -Renal US negative for any acute findings.  -Resolved with iv hydration.    #Hypertension:   -Home medications benazepril and amlodipine.Held by ICU team.Will resume before discharge.    #Hypoalbuminemia: 3.2.  Likely in setting of poor general nutrition given age and body habitus.   - BMI 17.89.    DAILY ROUNDS CHECKLIST    DISPO: SNF    START PRE-CERT?: Yes  DME: N/A  ESTIMATED DAY OF DC:  depending on clinical course    BRIEF PLAN OF CARE:   STEMI S/P PCI TO RCA  PNEUMONIA ON IV ZOSYN  NEW

## 2024-06-25 NOTE — PLAN OF CARE
Problem: Discharge Planning  Goal: Discharge to home or other facility with appropriate resources  Outcome: Progressing  Flowsheets (Taken 6/24/2024 1330 by Jodee Fried, RN)  Discharge to home or other facility with appropriate resources: Identify barriers to discharge with patient and caregiver  Note: She is from home alone, we are unsure of the plan at discharge.       Problem: ABCDS Injury Assessment  Goal: Absence of physical injury  Outcome: Progressing  Flowsheets (Taken 6/23/2024 2235 by Kimberly Pardo, RN)  Absence of Physical Injury: Implement safety measures based on patient assessment  Note: Bed locked & in low position, call light in reach, side-rails up x2, bed/chair alarm utilized, non-slip socks on when ambulating, reminded patient to use call light to call for assistance.       Problem: Safety - Adult  Goal: Free from fall injury  Outcome: Progressing     Problem: Cardiovascular - Adult  Goal: Maintains optimal cardiac output and hemodynamic stability  Outcome: Progressing  Flowsheets (Taken 6/24/2024 1330 by Jodee Fried, RN)  Maintains optimal cardiac output and hemodynamic stability: Monitor blood pressure and heart rate  Note: Ongoing assessment & interventions provided throughout shift.  Patient on continuous telemetry monitoring, heart tones, vitals & pulses checked at least 3 times per shift & PRN. Vitals within acceptable limits.  Peripheral pulses palpable.    Goal: Absence of cardiac dysrhythmias or at baseline  Outcome: Progressing     Problem: Skin/Tissue Integrity - Adult  Goal: Skin integrity remains intact  Outcome: Progressing  Flowsheets (Taken 6/24/2024 1330 by Jodee Fried, RN)  Skin Integrity Remains Intact: Monitor for areas of redness and/or skin breakdown  Note: Ongoing assessment & interventions provided throughout shift.  Skin assessments provided.  Encouraging/assisting patient to turn as needed.       Problem: Pain  Goal: Verbalizes/displays adequate comfort

## 2024-06-25 NOTE — PROGRESS NOTES
[place] for Pulmonary, Critical Care and Sleep Medicine    Patient - Erin Hanna   MRN -  [id number]   [id number]   - 1945      Date of Admission -  2024  4:37 AM  Date of evaluation -  2024  Room - 3B--A   Hospital Day - 8  Consulting - Heidi Thomas MD Primary Care Physician - Claudine Gutierrez APRN - DWAYNE   Chief complaint/ Reason for Admission/ Consult:   Increased SOB, right LL mass   HPI   History obtained from Patient and EMR    Erin Hanna is a 79 y.o. female admitted for STEMI . S/p PCI to RCA . Ischemic cardiomyopathy.  -ECHO done showed EF of 40-45%  Pneumonia panel positive for  staph aureus.  -As per pharmacy its MSSA so vancomycin was not given and patient continued on Augmentin.  -Patient reports chronic daily cough, currently worse.  At home would expel little phlegm in mornings, now expelling blood tinged sputum frequently   Denies pain   No personal history of cancer .   History of COPD, diagnosed many years ago . On home inhalers, she can not recall names,prescribed by primary care.   -Antibiotics to Zosyn as patient had poor response to Augmentin  -CT  chest.done showed 8.3 x 5.6 x 6 cm mass in the right lower lobe.Diffuse COPD and thickening of the interstitial lung markings.  -Pulmonology consulted.  Occupation: retired.      Erin Hanna denies history of exposure to Asbestos or Silicone.She denies history of exposure to coal, foundry, quarry, or significant farm dust exposure.  Patient denies any recent travel history.  Patient denies history of exposure to birds, pigeons, or chickens in the past. The patient denies pet animals at home. She had none  in the home.  She  denies to TB exposure in the past.  She denies to history of recreational or IV drug use.       Subjective/Events Past 24 hours/ROS   -She is on oxygen 3L/min by nasal cannula.  -No chest pain  -She underwent EBUS procedure by Dr. Tone Rosales, DO yesterday on

## 2024-06-25 NOTE — PROGRESS NOTES
[facility]  INPATIENT PHYSICAL THERAPY  DAILY NOTE  STRZ CCU-STEPDOWN 3B - 3B-25/025-A    Time In: 1425  Time Out: 1501  Timed Code Treatment Minutes: 36 Minutes  Minutes: 36          Date: 2024  Patient Name: Erin Hanna,  Gender:  female        MRN: [id number]  : 1945  (79 y.o.)     Referring Practitioner: Gifty Castle APRN - CNP  Diagnosis: STEMI  Additional Pertinent Hx: Per EMR: 89-year-old female with history of hypertension and CKD 3A presenting with worsening shortness of breath.  This been going on for the past few days.  Woke up very early this morning with severe shortness of breath.  Denied any chest pain/arm pain/jaw pain at that time.  Denied palpitations.  Denied nausea or vomiting.  Was brought to the ED, EKG showed inferolateral ST elevations, elevated troponin at 136.  No prior history of stents.  Interventional cardiology consulted, patient taken for emergent cath, PCI w/ SANTA x 3 to RCA. s/p BRONCHOSCOPY ENDOBRONCHIAL ULTRASOUND      Prior Level of Function:  Lives With: Alone  Type of Home: House (Duplex)  Home Layout: Two level (Daughter lives on 1st floor, patient prefers the 2nd story)  Home Access: Stairs to enter without rails  Entrance Stairs - Number of Steps: 3 JORDYN without handrails, but planning to install them. Full flight of steps to 2nd story with 1 handrail.  Home Equipment: None   Bathroom Shower/Tub: Tub/Shower unit  Bathroom Toilet: Standard  Bathroom Equipment: None    ADL Assistance: Independent  Homemaking Assistance: Independent  Homemaking Responsibilities: Yes (Takes care of her own housekeeping needs.)  Ambulation Assistance: Independent  Transfer Assistance: Independent  Active : Yes  Additional Comments: Patient reporting she was IND PTA without use of an AD, was not on oxygen PTA. Patient states her daughter works during the day but has granddtr here from [place] and thinking about staying for the summer to assist if  Mobility Raw Score : 17  AM-PAC Inpatient T-Scale Score : 42.13  Mobility Inpatient CMS 0-100% Score: 50.57  Mobility Inpatient CMS G-Code Modifier : CK        Modified Buchanan Scale:  Not Applicable    ASSESSMENT:  Assessment: Patient progressing toward established goals. Pt demonstrates impairments with strength, balance, endurance, activity tolerance, independence, requires hands on assistance for safety with mobility and would benefit from continued skilled PT improve these impairments, to prevent falls and ensure safety with mobility, pt will greatly benefit from SNF stay to address activity tolerance prior to returning home.    Activity Tolerance:  Patient tolerance of  treatment: good.      Equipment Recommendations:Equipment Needed: Yes (would benefit from RW at discharge)  Discharge Recommendations: Subacte/Skilled Nursing Facility  Plan: General Plan:  (5x)    Education  Education:  Learners: Patient  Patient Education: Plan of Care, Education Related to Diagnosis, Transfers, Gait, Verbal Exercise Instruction    Goals:  Patient Goals : Pt goal to return home  Short Term Goals  Time Frame for Short Term Goals: By discharge  Short Term Goal 1: Supine to/from sit at mod I to get in/out of bed.  Short Term Goal 2: Sit to/from stand at mod I to get up to walk  Short Term Goal 3: Ambulate 150 feet with RW at Mod I to walk in home safely  Short Term Goal 4: Ascend/Descend full flight of steps with 1 handrail at CGA to enter/exit 2nd floor of home.  Short Term Goal 5: Ascend/Descend 3 steps with HHA at CGA to enter/exit home.  Long Term Goals  Time Frame for Long Term Goals : NA due to short ELOS    Following session, patient left in safe position with all fall risk precautions in place.

## 2024-06-26 ENCOUNTER — APPOINTMENT (OUTPATIENT)
Dept: GENERAL RADIOLOGY | Age: 79
End: 2024-06-26
Payer: MEDICARE

## 2024-06-26 LAB
ANION GAP SERPL CALC-SCNC: 10 MEQ/L (ref 8–16)
ANION GAP SERPL CALC-SCNC: 8 MEQ/L (ref 8–16)
BASOPHILS ABSOLUTE: 0 THOU/MM3 (ref 0–0.1)
BASOPHILS NFR BLD AUTO: 0 %
BUN SERPL-MCNC: 17 MG/DL (ref 7–22)
BUN SERPL-MCNC: 20 MG/DL (ref 7–22)
CA-I BLD ISE-SCNC: 1.12 MMOL/L (ref 1.12–1.32)
CALCIUM SERPL-MCNC: 8.6 MG/DL (ref 8.5–10.5)
CALCIUM SERPL-MCNC: 8.7 MG/DL (ref 8.5–10.5)
CHLORIDE SERPL-SCNC: 102 MEQ/L (ref 98–111)
CHLORIDE SERPL-SCNC: 104 MEQ/L (ref 98–111)
CO2 SERPL-SCNC: 27 MEQ/L (ref 23–33)
CO2 SERPL-SCNC: 28 MEQ/L (ref 23–33)
CREAT SERPL-MCNC: 0.8 MG/DL (ref 0.4–1.2)
CREAT SERPL-MCNC: 0.9 MG/DL (ref 0.4–1.2)
DEPRECATED RDW RBC AUTO: 48.9 FL (ref 35–45)
EOSINOPHIL NFR BLD AUTO: 0.1 %
EOSINOPHILS ABSOLUTE: 0 THOU/MM3 (ref 0–0.4)
ERYTHROCYTE [DISTWIDTH] IN BLOOD BY AUTOMATED COUNT: 14.2 % (ref 11.5–14.5)
GFR SERPL CREATININE-BSD FRML MDRD: 65 ML/MIN/1.73M2
GFR SERPL CREATININE-BSD FRML MDRD: 75 ML/MIN/1.73M2
GLUCOSE SERPL-MCNC: 120 MG/DL (ref 70–108)
GLUCOSE SERPL-MCNC: 134 MG/DL (ref 70–108)
HCT VFR BLD AUTO: 38 % (ref 37–47)
HGB BLD-MCNC: 11.8 GM/DL (ref 12–16)
IMM GRANULOCYTES # BLD AUTO: 0.04 THOU/MM3 (ref 0–0.07)
IMM GRANULOCYTES NFR BLD AUTO: 0.4 %
LYMPHOCYTES ABSOLUTE: 0.5 THOU/MM3 (ref 1–4.8)
LYMPHOCYTES NFR BLD AUTO: 6.1 %
MAGNESIUM SERPL-MCNC: 1.8 MG/DL (ref 1.6–2.4)
MCH RBC QN AUTO: 29.4 PG (ref 26–33)
MCHC RBC AUTO-ENTMCNC: 31.1 GM/DL (ref 32.2–35.5)
MCV RBC AUTO: 94.5 FL (ref 81–99)
MONOCYTES ABSOLUTE: 0.8 THOU/MM3 (ref 0.4–1.3)
MONOCYTES NFR BLD AUTO: 8.8 %
NEUTROPHILS ABSOLUTE: 7.6 THOU/MM3 (ref 1.8–7.7)
NEUTROPHILS NFR BLD AUTO: 84.6 %
NRBC BLD AUTO-RTO: 0 /100 WBC
PHOSPHATE SERPL-MCNC: 1.9 MG/DL (ref 2.4–4.7)
PLATELET # BLD AUTO: 136 THOU/MM3 (ref 130–400)
PLATELET BLD QL SMEAR: ADEQUATE
PMV BLD AUTO: 12.1 FL (ref 9.4–12.4)
POTASSIUM SERPL-SCNC: 4 MEQ/L (ref 3.5–5.2)
POTASSIUM SERPL-SCNC: 4.3 MEQ/L (ref 3.5–5.2)
RBC # BLD AUTO: 4.02 MILL/MM3 (ref 4.2–5.4)
SCAN OF BLOOD SMEAR: NORMAL
SODIUM SERPL-SCNC: 139 MEQ/L (ref 135–145)
SODIUM SERPL-SCNC: 140 MEQ/L (ref 135–145)
WBC # BLD AUTO: 9 THOU/MM3 (ref 4.8–10.8)

## 2024-06-26 PROCEDURE — 85025 COMPLETE CBC W/AUTO DIFF WBC: CPT

## 2024-06-26 PROCEDURE — 80048 BASIC METABOLIC PNL TOTAL CA: CPT

## 2024-06-26 PROCEDURE — 6360000002 HC RX W HCPCS: Performed by: INTERNAL MEDICINE

## 2024-06-26 PROCEDURE — 2140000000 HC CCU INTERMEDIATE R&B

## 2024-06-26 PROCEDURE — 93005 ELECTROCARDIOGRAM TRACING: CPT | Performed by: HOSPITALIST

## 2024-06-26 PROCEDURE — 6370000000 HC RX 637 (ALT 250 FOR IP): Performed by: INTERNAL MEDICINE

## 2024-06-26 PROCEDURE — 2580000003 HC RX 258: Performed by: INTERNAL MEDICINE

## 2024-06-26 PROCEDURE — 36415 COLL VENOUS BLD VENIPUNCTURE: CPT

## 2024-06-26 PROCEDURE — 71045 X-RAY EXAM CHEST 1 VIEW: CPT

## 2024-06-26 PROCEDURE — 97110 THERAPEUTIC EXERCISES: CPT

## 2024-06-26 PROCEDURE — 6360000002 HC RX W HCPCS: Performed by: PHYSICIAN ASSISTANT

## 2024-06-26 PROCEDURE — 83735 ASSAY OF MAGNESIUM: CPT

## 2024-06-26 PROCEDURE — 82330 ASSAY OF CALCIUM: CPT

## 2024-06-26 PROCEDURE — 99232 SBSQ HOSP IP/OBS MODERATE 35: CPT | Performed by: INTERNAL MEDICINE

## 2024-06-26 PROCEDURE — 84100 ASSAY OF PHOSPHORUS: CPT

## 2024-06-26 PROCEDURE — 97116 GAIT TRAINING THERAPY: CPT

## 2024-06-26 PROCEDURE — 99233 SBSQ HOSP IP/OBS HIGH 50: CPT | Performed by: HOSPITALIST

## 2024-06-26 PROCEDURE — 6370000000 HC RX 637 (ALT 250 FOR IP): Performed by: NURSE PRACTITIONER

## 2024-06-26 PROCEDURE — 6370000000 HC RX 637 (ALT 250 FOR IP)

## 2024-06-26 RX ORDER — MAGNESIUM SULFATE IN WATER 40 MG/ML
2000 INJECTION, SOLUTION INTRAVENOUS ONCE
Status: COMPLETED | OUTPATIENT
Start: 2024-06-26 | End: 2024-06-26

## 2024-06-26 RX ORDER — ALBUTEROL SULFATE 2.5 MG/3ML
2.5 SOLUTION RESPIRATORY (INHALATION) EVERY 6 HOURS PRN
Status: DISCONTINUED | OUTPATIENT
Start: 2024-06-26 | End: 2024-07-06 | Stop reason: HOSPADM

## 2024-06-26 RX ADMIN — METOPROLOL SUCCINATE 50 MG: 50 TABLET, EXTENDED RELEASE ORAL at 08:50

## 2024-06-26 RX ADMIN — ASPIRIN 81 MG 81 MG: 81 TABLET ORAL at 08:50

## 2024-06-26 RX ADMIN — PIPERACILLIN AND TAZOBACTAM 3375 MG: 3; .375 INJECTION, POWDER, FOR SOLUTION INTRAVENOUS at 22:48

## 2024-06-26 RX ADMIN — TICAGRELOR 90 MG: 90 TABLET ORAL at 20:24

## 2024-06-26 RX ADMIN — ATORVASTATIN CALCIUM 40 MG: 40 TABLET, FILM COATED ORAL at 20:24

## 2024-06-26 RX ADMIN — SODIUM CHLORIDE, PRESERVATIVE FREE 10 ML: 5 INJECTION INTRAVENOUS at 08:50

## 2024-06-26 RX ADMIN — MAGNESIUM SULFATE HEPTAHYDRATE 2000 MG: 40 INJECTION, SOLUTION INTRAVENOUS at 18:27

## 2024-06-26 RX ADMIN — PIPERACILLIN AND TAZOBACTAM 3375 MG: 3; .375 INJECTION, POWDER, FOR SOLUTION INTRAVENOUS at 14:57

## 2024-06-26 RX ADMIN — GUAIFENESIN 600 MG: 600 TABLET, EXTENDED RELEASE ORAL at 08:50

## 2024-06-26 RX ADMIN — FLUTICASONE PROPIONATE 1 SPRAY: 50 SPRAY, METERED NASAL at 08:50

## 2024-06-26 RX ADMIN — PIPERACILLIN AND TAZOBACTAM 3375 MG: 3; .375 INJECTION, POWDER, FOR SOLUTION INTRAVENOUS at 06:03

## 2024-06-26 RX ADMIN — GUAIFENESIN 600 MG: 600 TABLET, EXTENDED RELEASE ORAL at 20:24

## 2024-06-26 RX ADMIN — TICAGRELOR 90 MG: 90 TABLET ORAL at 08:50

## 2024-06-26 ASSESSMENT — PAIN SCALES - GENERAL
PAINLEVEL_OUTOF10: 0
PAINLEVEL_OUTOF10: 0

## 2024-06-26 NOTE — PROGRESS NOTES
[place] for Pulmonary, Critical Care and Sleep Medicine    Patient - Erin Hanna   MRN -  [id number]   [id number]   - 1945      Date of Admission -  2024  4:37 AM  Date of evaluation -  2024  Room - 3B-025-A   Hospital Day - 9  Consulting - Antonio Patricia MD Primary Care Physician - Claudine Gutierrez APRN - CNP   Reason for Consult:   SOB, RLL lung  mass   HPI   History obtained from Patient and EMR    Erin Hanna is a 79 y.o. female admitted for STEMI . S/p PCI to RCA . Ischemic cardiomyopathy.  -ECHO done showed EF of 40-45%  Pneumonia panel positive for  staph aureus.  -As per pharmacy its MSSA so vancomycin was not given and patient continued on Augmentin.  -Patient reports chronic daily cough, currently worse.  At home would expel little phlegm in mornings, now expelling blood tinged sputum frequently   Denies pain   No personal history of cancer .   History of COPD, diagnosed many years ago . On home inhalers, she can not recall names,prescribed by primary care.   -Antibiotics to Zosyn as patient had poor response to Augmentin  -CT  chest.done showed 8.3 x 5.6 x 6 cm mass in the right lower lobe.Diffuse COPD and thickening of the interstitial lung markings.  -Pulmonology consulted.  Occupation: retired.      Erin Hanna denies history of exposure to Asbestos or Silicone.She denies history of exposure to coal, foundry, quarry, or significant farm dust exposure.  Patient denies any recent travel history.  Patient denies history of exposure to birds, pigeons, or chickens in the past. The patient denies pet animals at home. She had none  in the home.  She  denies to TB exposure in the past.  She denies to history of recreational or IV drug use.     EBUS completed by Dr. Lugo 2024    Subjective/Events Past 24 hours/ROS   -On 3 LPM via NC   -Biopsy report pending   -Reports ongoing thick secretions had some pink/red tinge yesterday has not seen any today    A: 4L lymph node, endobronchial ultrasound, fine needle aspiration   (EBUS-FNA):    No malignant cells seen.    Specimen consists of small, benign appearing lymphocytes and   anthracotic macrophages.     B: Station 7 lymph node, EBUS-FNA:    No malignant cells seen.    Specimen consists of small, benign appearing lymphocytes and   anthracotic macrophages.     C: 10R lymph node, EBUS-FNA:    POSITIVE FOR MALIGNANCY.    Findings are consistent with squamous cell carcinoma.     D: Right lower lobe of lung, mass, EBUS-FNA:    POSITIVE FOR MALIGNANCY.    Findings are consistent with squamous cell carcinoma.     Note: In parts C and D, there are abundant malignant tumor cells with   an epithelioid and spindled morphology. Areas of keratinization and   orangeophilia are seen. The morphology is consistent with squamous cell   carcinoma.       SOURCE:   A) FINE NEEDLE ASPIRATE, 4L LYMPH NODE     Source Description:                 Materials Prepared & Examined:                                       Cell Blocks................. 1   Color........... Pink               Smear Slides............... 2   Consistency...... Thin              Monolayers................. 1   Other................. less than 1    ml added to fixative       B) FINE NEEDLE ASPIRATE, STATION 7 LYMPH NODE     Source Description:                 Materials Prepared & Examined:                                       Cell Blocks................. 1   Color........... Red                Smear Slides............... 2   Consistency...... Thin              Monolayers................. 1   Other................. less than 1    ml added to fixative       C) FINE NEEDLE ASPIRATE, 10R LYMPH NODE     Source Description:                 Materials Prepared & Examined:                                       Cell Blocks................. 1   Color........... Red                Smear Slides............... 2   Consistency...... Thin              Monolayers................. 1

## 2024-06-26 NOTE — PLAN OF CARE
Problem: Discharge Planning  Goal: Discharge to home or other facility with appropriate resources  6/26/2024 0230 by Juan Antonio Cortes RN  Outcome: Progressing  Flowsheets (Taken 6/26/2024 0230)  Discharge to home or other facility with appropriate resources: Identify barriers to discharge with patient and caregiver     Problem: ABCDS Injury Assessment  Goal: Absence of physical injury  6/26/2024 0230 by Juan Antonoi Cortes RN  Outcome: Progressing  Flowsheets (Taken 6/26/2024 0230)  Absence of Physical Injury: Implement safety measures based on patient assessment     Problem: Safety - Adult  Goal: Free from fall injury  6/26/2024 0230 by Juan Antonio Cortes RN  Outcome: Progressing  Flowsheets (Taken 6/26/2024 0230)  Free From Fall Injury: Instruct family/caregiver on patient safety     Problem: Cardiovascular - Adult  Goal: Maintains optimal cardiac output and hemodynamic stability  6/26/2024 0230 by Juan Antonio Cortes RN  Outcome: Progressing  Flowsheets (Taken 6/26/2024 0230)  Maintains optimal cardiac output and hemodynamic stability: Monitor blood pressure and heart rate     Problem: Cardiovascular - Adult  Goal: Absence of cardiac dysrhythmias or at baseline  6/26/2024 0230 by Juan Antonio Cortes RN  Outcome: Progressing  Flowsheets (Taken 6/26/2024 0230)  Absence of cardiac dysrhythmias or at baseline: Monitor cardiac rate and rhythm     Problem: Skin/Tissue Integrity - Adult  Goal: Skin integrity remains intact  6/26/2024 0230 by Juan Antonio Cortes RN  Outcome: Progressing  Flowsheets (Taken 6/26/2024 0230)  Skin Integrity Remains Intact:   Monitor for areas of redness and/or skin breakdown   Assess vascular access sites hourly     Problem: Pain  Goal: Verbalizes/displays adequate comfort level or baseline comfort level  6/26/2024 0230 by Juan Antonio Cortes RN  Outcome: Progressing  Flowsheets (Taken 6/26/2024 0230)  Verbalizes/displays adequate comfort level or baseline comfort level:

## 2024-06-26 NOTE — PROGRESS NOTES
Hospitalist Progress Note    Patient:  Erin Hanna      Unit/Bed:3B-25/025-A    YOB: 1945    MRN: [id number]       Acct: [id number]     PCP: Claudine Gutierrez APRN - CNP    Date of Admission: 6/17/2024    Assessment/Plan:    STEMI: Patient status post PCI with SANTA to RCA x 3.  Continue with Brilinta and aspirin.  Appreciate cardiology input.  Continue with metoprolol 50 mg daily, atorvastatin 40 mg daily  Ischemic cardiomyopathy: Ejection fraction 40 to 45%.  Does not appear to be volume overloaded during acute CHF exacerbation.  GDMT.  Right lower lobe mass with collapse: Patient status post EBUS 6/24/2024.  Biopsy back today is positive for non-small cell carcinoma.  Discussed with pulmonology will consult with radiation oncology.  Continue with supplemental O2.  Aggressive pulmonary toiletry.  MSSA pneumonia: Continue with antibiotics, currently on IV Zosyn x 2 more days, so far cultures only growing out MSSA but given right lung collapse escalate antibiotics to cover for anaerobes  CKD stage III: Avoid nephrotoxic medications and monitor renal function.  Hypertension: Continue with metoprolol 50 mg daily, monitor blood pressure   COPD: As needed bronchodilators.  History of smoking  VTE prophylaxis: SCDs, restart heparin subcu if no further procedures or biopsies anticipate        Subjective (past 24 hours):   Patient seen and examined at bedside, states that she is doing okay she reports she is not having a poor appetite because interrupted frequently during meals and has not been eating well.  No reports of shortness of breath while at rest.      Medications:  Reviewed    Infusion Medications    sodium chloride       Scheduled Medications    tiotropium-olodaterol  2 puff Inhalation Daily    magnesium sulfate  2,000 mg IntraVENous Once    guaiFENesin  600 mg Oral BID    metoprolol succinate  50 mg Oral Daily    fluticasone  1 spray Each Nostril Daily    piperacillin-tazobactam  3,375 mg  increased density right cardiophrenic sulcus. Possibilities include pneumonia versus mass lesion. 3. CT thorax recommended for further evaluation. **This report has been created using voice recognition software.  It may contain minor errors which are inherent in voice recognition technology.** Electronically signed by Dr. Johnny Gutierres    US RENAL COMPLETE    Result Date: 6/17/2024  PROCEDURE: US RENAL COMPLETE CLINICAL INFORMATION: Acute kidney injury TECHNIQUE: Ultrasound of the kidneys and urinary bladder was performed. Grayscale and color images were obtained. COMPARISON: Renal ultrasound 9/22/2021 FINDINGS: The right kidney measures 10.0 x 4.2 x 5.0 cm and the left kidney measures 5.5 x 4.0 x 3.3 cm. There is cortical thinning on the left side. There is no hydronephrosis, calculi or intrarenal mass. Color Doppler demonstrates expected waveforms in the bilateral renal arteries. Arcuate resistive indices are mildly elevated at 0.8 bilaterally. There is a catheter in the urinary bladder.     1. Left renal cortical thinning and atrophy. 2. Mildly elevated bilateral arcuate resistive indices. Electronically signed by Dr. Tyrese Sarah    Cardiac procedure    Result Date: 6/17/2024    Successful PCI of RCA STEMI using OCT guidance followed by 3 overlapping SANTA       DVT prophylaxis: [] Lovenox                                 [] SCDs                                 [] SQ Heparin                                 [] Encourage ambulation           [] Already on Anticoagulation     Code Status: Full Code    Tele:   [] yes             [] no    Active Hospital Problems    Diagnosis Date Noted    Tobacco smoke exposure [Z77.22] 06/24/2024    Mediastinal lymphadenopathy [R59.0] 06/24/2024    Right lower lobe lung mass [R91.8] 06/22/2024    Severe malnutrition (HCC) [E43] 06/20/2024     Class: Acute    STEMI (ST elevation myocardial infarction) (HCC) [I21.3] 06/17/2024       Electronically signed by Antonio Patricia MD on

## 2024-06-26 NOTE — PLAN OF CARE
patient assessment  Note: Bed locked & in low position, call light in reach, side-rails up x2, bed/chair alarm utilized, non-slip socks on when ambulating, reminded patient to use call light to call for assistance.   6/26/2024 0230 by Juan Antonio Cortes RN  Outcome: Progressing  Flowsheets (Taken 6/26/2024 0230)  Absence of Physical Injury: Implement safety measures based on patient assessment     Problem: Safety - Adult  Goal: Free from fall injury  6/26/2024 1045 by Maia Manning RN  Outcome: Progressing  Flowsheets (Taken 6/26/2024 0230 by Juan Antonio Cortes RN)  Free From Fall Injury: Instruct family/caregiver on patient safety  Note: Bed locked & in low position, call light in reach, side-rails up x2, bed/chair alarm utilized, non-slip socks on when ambulating, reminded patient to use call light to call for assistance.    6/26/2024 0230 by Juan Antonio Cortes RN  Outcome: Progressing  Flowsheets (Taken 6/26/2024 0230)  Free From Fall Injury: Instruct family/caregiver on patient safety     Problem: Cardiovascular - Adult  Goal: Maintains optimal cardiac output and hemodynamic stability  6/26/2024 1045 by Maia Manning RN  Outcome: Progressing  Flowsheets (Taken 6/26/2024 0230 by Juan Antonio Cortes RN)  Maintains optimal cardiac output and hemodynamic stability: Monitor blood pressure and heart rate  Note: Ongoing assessment & interventions provided throughout shift.  Patient on continuous telemetry monitoring, heart tones, vitals & pulses checked at least 3 times per shift & PRN. Vitals within acceptable limits.  Peripheral pulses palpable.  She continues to have tachycardia and SOB with ambulation.    6/26/2024 0230 by Juan Antonio Cortes RN  Outcome: Progressing  Flowsheets (Taken 6/26/2024 0230)  Maintains optimal cardiac output and hemodynamic stability: Monitor blood pressure and heart rate  Goal: Absence of cardiac dysrhythmias or at baseline  6/26/2024 1045 by Maia Manning RN  Outcome:  Progressing  6/26/2024 0230 by Juan Antonio Cortes RN  Outcome: Progressing  Flowsheets (Taken 6/26/2024 0230)  Absence of cardiac dysrhythmias or at baseline: Monitor cardiac rate and rhythm     Problem: Skin/Tissue Integrity - Adult  Goal: Skin integrity remains intact  6/26/2024 1045 by Maia Manning RN  Outcome: Progressing  Flowsheets (Taken 6/26/2024 0230 by Juan Antonio Cortes RN)  Skin Integrity Remains Intact:   Monitor for areas of redness and/or skin breakdown   Assess vascular access sites hourly  Note: Ongoing assessment & interventions provided throughout shift.  Skin assessments provided.  Encouraging/assisting patient to turn as needed.    6/26/2024 0230 by Juan Antonio Cortes RN  Outcome: Progressing  Flowsheets (Taken 6/26/2024 0230)  Skin Integrity Remains Intact:   Monitor for areas of redness and/or skin breakdown   Assess vascular access sites hourly     Problem: Pain  Goal: Verbalizes/displays adequate comfort level or baseline comfort level  6/26/2024 1045 by Maia Manning RN  Outcome: Progressing  Flowsheets (Taken 6/26/2024 0230 by Juan Antonio Cortes RN)  Verbalizes/displays adequate comfort level or baseline comfort level:   Encourage patient to monitor pain and request assistance   Assess pain using appropriate pain scale  Note: Ongoing assessment & interventions provided throughout shift.  Reminded patient to report any pain, pressure, or shortness of breath to the nurse.  Pain medications provided per physician's orders.    6/26/2024 0230 by Juan Antonio Cortes RN  Outcome: Progressing  Flowsheets (Taken 6/26/2024 0230)  Verbalizes/displays adequate comfort level or baseline comfort level:   Encourage patient to monitor pain and request assistance   Assess pain using appropriate pain scale     Problem: Skin/Tissue Integrity  Goal: Absence of new skin breakdown  Description: 1.  Monitor for areas of redness and/or skin breakdown  2.  Assess vascular access sites hourly  3.

## 2024-06-26 NOTE — PROGRESS NOTES
[facility]  INPATIENT PHYSICAL THERAPY  DAILY NOTE  STRZ CCU-STEPDOWN 3B - 3B-25/025-A    Time In: 0823  Time Out: 0846  Timed Code Treatment Minutes: 23 Minutes  Minutes: 23          Date: 2024  Patient Name: Erin Hanna,  Gender:  female        MRN: [id number]  : 1945  (79 y.o.)     Referring Practitioner: Gifty Castle APRN - CNP  Diagnosis: STEMI  Additional Pertinent Hx: Per EMR: 89-year-old female with history of hypertension and CKD 3A presenting with worsening shortness of breath.  This been going on for the past few days.  Woke up very early this morning with severe shortness of breath.  Denied any chest pain/arm pain/jaw pain at that time.  Denied palpitations.  Denied nausea or vomiting.  Was brought to the ED, EKG showed inferolateral ST elevations, elevated troponin at 136.  No prior history of stents.  Interventional cardiology consulted, patient taken for emergent cath, PCI w/ SANTA x 3 to RCA. s/p BRONCHOSCOPY ENDOBRONCHIAL ULTRASOUND      Prior Level of Function:  Lives With: Alone  Type of Home: House (Duplex)  Home Layout: Two level (Daughter lives on 1st floor, patient prefers the 2nd story)  Home Access: Stairs to enter without rails  Entrance Stairs - Number of Steps: 3 JORDYN without handrails, but planning to install them. Full flight of steps to 2nd story with 1 handrail.  Home Equipment: None   Bathroom Shower/Tub: Tub/Shower unit  Bathroom Toilet: Standard  Bathroom Equipment: None    ADL Assistance: Independent  Homemaking Assistance: Independent  Homemaking Responsibilities: Yes (Takes care of her own housekeeping needs.)  Ambulation Assistance: Independent  Transfer Assistance: Independent  Active : Yes  Additional Comments: Patient reporting she was IND PTA without use of an AD, was not on oxygen PTA. Patient states her daughter works during the day but has granddtr here from [place] and thinking about staying for the summer to assist if

## 2024-06-26 NOTE — PALLIATIVE CARE
Initial Evaluation        Patient:   Erin Hanna  YOB: 1945  Age:  79 y.o.  Room:  [address] [place] [address]  MRN:  [id number]   Acct: [id number]    Date of Admission:  6/17/2024  4:37 AM  Date of Service:  6/26/2024  Completed By:  Jennifer Bower RN        Reason for Palliative Care Evaluation:-   Goals of Care and Code status discussion     Current Concerns   shortness of breath and awaiting biopsy results.     Palliative Performance Scale   60%  Ambulation reduced; Significant disease; Can't do hobbies/housework; intake normal or reduced; occasional assist; LOC full/confusion     History   Patient into ED due to SOB x 2 weeks. Patient also had noticed worsening BLE edema.  STEMI, taken to cath lab. PCI to RCA. CT chest revealed mass in right lower lobe. Bronch with biopsy completed 6/24, awaiting results.      Goals of Care Discussions and Plan         Family/Patient Discussion:- In room to speak with patient. Patient up in chair. Patient able to inform this RN of reason for admission. Patient stated \"well I had chest pain, now I have a whole lot more issues\". Discussed patient's current plan of care. Patient states she is doing \"okay\" with current updates. States her 2 children are not coping well though. Patient shares that she lost her  in November 2023. She and her family are still coping this loss. Patient states she lives in a duplex, where her daughter lives next door. Patient states she has a lot of help from her kids and grandchildren. Discussed that plan of care does depend on what biopsy results as. Did not discuss options at this time due to emotional distress of current health concerns. Patient states she thinks she will be discharged in a couple days. Discussed following palliative outpatient, patient agrees. Discussed goals of palliative care. Encouraged patient to call or have her children call if they had further questions/concerns.     Plan/Follow-Up:-   Continue

## 2024-06-26 NOTE — CARE COORDINATION
6/26/24, 1:31 PM EDT    DISCHARGE PLANNING EVALUATION    Met with Erin today regarding discharge plan.  She told SW that she is pretty sure she will have 24 hour supervision at home when she is discharged.  Made her aware she is close to discharge, so she needs to verify with family that they will be available.  She is agreeable to home health.  Gave her a list of agencies and she said she would pick an agency today.

## 2024-06-27 ENCOUNTER — TELEPHONE (OUTPATIENT)
Dept: PULMONOLOGY | Age: 79
End: 2024-06-27

## 2024-06-27 ENCOUNTER — APPOINTMENT (OUTPATIENT)
Dept: CT IMAGING | Age: 79
End: 2024-06-27
Payer: MEDICARE

## 2024-06-27 ENCOUNTER — APPOINTMENT (OUTPATIENT)
Dept: MRI IMAGING | Age: 79
End: 2024-06-27
Payer: MEDICARE

## 2024-06-27 DIAGNOSIS — C34.91 SQUAMOUS CELL CARCINOMA OF RIGHT LUNG (HCC): Primary | ICD-10-CM

## 2024-06-27 PROBLEM — J98.19 LUNG COLLAPSE: Status: ACTIVE | Noted: 2024-06-27

## 2024-06-27 LAB
ANION GAP SERPL CALC-SCNC: 8 MEQ/L (ref 8–16)
BUN SERPL-MCNC: 20 MG/DL (ref 7–22)
CALCIUM SERPL-MCNC: 8.3 MG/DL (ref 8.5–10.5)
CHLORIDE SERPL-SCNC: 104 MEQ/L (ref 98–111)
CO2 SERPL-SCNC: 28 MEQ/L (ref 23–33)
CREAT SERPL-MCNC: 0.7 MG/DL (ref 0.4–1.2)
DEPRECATED RDW RBC AUTO: 48.8 FL (ref 35–45)
EKG ATRIAL RATE: 76 BPM
EKG ATRIAL RATE: 84 BPM
EKG P AXIS: 71 DEGREES
EKG P AXIS: 76 DEGREES
EKG P-R INTERVAL: 114 MS
EKG P-R INTERVAL: 126 MS
EKG Q-T INTERVAL: 320 MS
EKG Q-T INTERVAL: 360 MS
EKG QRS DURATION: 84 MS
EKG QRS DURATION: 88 MS
EKG QTC CALCULATION (BAZETT): 378 MS
EKG QTC CALCULATION (BAZETT): 405 MS
EKG R AXIS: -21 DEGREES
EKG R AXIS: 5 DEGREES
EKG T AXIS: 58 DEGREES
EKG T AXIS: 78 DEGREES
EKG VENTRICULAR RATE: 76 BPM
EKG VENTRICULAR RATE: 84 BPM
ERYTHROCYTE [DISTWIDTH] IN BLOOD BY AUTOMATED COUNT: 14 % (ref 11.5–14.5)
FERRITIN SERPL IA-MCNC: 99 NG/ML (ref 10–291)
GFR SERPL CREATININE-BSD FRML MDRD: 88 ML/MIN/1.73M2
GLUCOSE SERPL-MCNC: 117 MG/DL (ref 70–108)
HCT VFR BLD AUTO: 35.6 % (ref 37–47)
HEMOCCULT STL QL: POSITIVE
HGB BLD-MCNC: 11.3 GM/DL (ref 12–16)
IRON SATN MFR SERPL: 13 % (ref 20–50)
IRON SERPL-MCNC: 21 UG/DL (ref 50–170)
MAGNESIUM SERPL-MCNC: 2.2 MG/DL (ref 1.6–2.4)
MCH RBC QN AUTO: 30.3 PG (ref 26–33)
MCHC RBC AUTO-ENTMCNC: 31.7 GM/DL (ref 32.2–35.5)
MCV RBC AUTO: 95.4 FL (ref 81–99)
PHOSPHATE SERPL-MCNC: 3.3 MG/DL (ref 2.4–4.7)
PLATELET # BLD AUTO: 127 THOU/MM3 (ref 130–400)
PMV BLD AUTO: 11.8 FL (ref 9.4–12.4)
POTASSIUM SERPL-SCNC: 4 MEQ/L (ref 3.5–5.2)
RBC # BLD AUTO: 3.73 MILL/MM3 (ref 4.2–5.4)
SODIUM SERPL-SCNC: 140 MEQ/L (ref 135–145)
TIBC SERPL-MCNC: 165 UG/DL (ref 171–450)
TSH SERPL DL<=0.005 MIU/L-ACNC: 0.45 UIU/ML (ref 0.4–4.2)
WBC # BLD AUTO: 9.5 THOU/MM3 (ref 4.8–10.8)

## 2024-06-27 PROCEDURE — 83540 ASSAY OF IRON: CPT

## 2024-06-27 PROCEDURE — 2700000000 HC OXYGEN THERAPY PER DAY

## 2024-06-27 PROCEDURE — 2580000003 HC RX 258: Performed by: INTERNAL MEDICINE

## 2024-06-27 PROCEDURE — 83550 IRON BINDING TEST: CPT

## 2024-06-27 PROCEDURE — 6370000000 HC RX 637 (ALT 250 FOR IP): Performed by: INTERNAL MEDICINE

## 2024-06-27 PROCEDURE — 6370000000 HC RX 637 (ALT 250 FOR IP): Performed by: NURSE PRACTITIONER

## 2024-06-27 PROCEDURE — 6360000002 HC RX W HCPCS: Performed by: INTERNAL MEDICINE

## 2024-06-27 PROCEDURE — 97530 THERAPEUTIC ACTIVITIES: CPT

## 2024-06-27 PROCEDURE — 82272 OCCULT BLD FECES 1-3 TESTS: CPT

## 2024-06-27 PROCEDURE — 6360000002 HC RX W HCPCS: Performed by: NURSE PRACTITIONER

## 2024-06-27 PROCEDURE — 84443 ASSAY THYROID STIM HORMONE: CPT

## 2024-06-27 PROCEDURE — A9579 GAD-BASE MR CONTRAST NOS,1ML: HCPCS | Performed by: PHYSICIAN ASSISTANT

## 2024-06-27 PROCEDURE — 84100 ASSAY OF PHOSPHORUS: CPT

## 2024-06-27 PROCEDURE — 2500000003 HC RX 250 WO HCPCS: Performed by: PHYSICIAN ASSISTANT

## 2024-06-27 PROCEDURE — 74177 CT ABD & PELVIS W/CONTRAST: CPT

## 2024-06-27 PROCEDURE — 99233 SBSQ HOSP IP/OBS HIGH 50: CPT | Performed by: HOSPITALIST

## 2024-06-27 PROCEDURE — 6360000004 HC RX CONTRAST MEDICATION: Performed by: INTERNAL MEDICINE

## 2024-06-27 PROCEDURE — 85027 COMPLETE CBC AUTOMATED: CPT

## 2024-06-27 PROCEDURE — 94761 N-INVAS EAR/PLS OXIMETRY MLT: CPT

## 2024-06-27 PROCEDURE — 99232 SBSQ HOSP IP/OBS MODERATE 35: CPT | Performed by: INTERNAL MEDICINE

## 2024-06-27 PROCEDURE — 93010 ELECTROCARDIOGRAM REPORT: CPT | Performed by: NUCLEAR MEDICINE

## 2024-06-27 PROCEDURE — 2140000000 HC CCU INTERMEDIATE R&B

## 2024-06-27 PROCEDURE — 93005 ELECTROCARDIOGRAM TRACING: CPT | Performed by: HOSPITALIST

## 2024-06-27 PROCEDURE — C9113 INJ PANTOPRAZOLE SODIUM, VIA: HCPCS | Performed by: HOSPITALIST

## 2024-06-27 PROCEDURE — 70553 MRI BRAIN STEM W/O & W/DYE: CPT

## 2024-06-27 PROCEDURE — 2500000003 HC RX 250 WO HCPCS

## 2024-06-27 PROCEDURE — 2580000003 HC RX 258

## 2024-06-27 PROCEDURE — 80048 BASIC METABOLIC PNL TOTAL CA: CPT

## 2024-06-27 PROCEDURE — 36415 COLL VENOUS BLD VENIPUNCTURE: CPT

## 2024-06-27 PROCEDURE — 82728 ASSAY OF FERRITIN: CPT

## 2024-06-27 PROCEDURE — 6370000000 HC RX 637 (ALT 250 FOR IP)

## 2024-06-27 PROCEDURE — 94640 AIRWAY INHALATION TREATMENT: CPT

## 2024-06-27 PROCEDURE — 6360000002 HC RX W HCPCS: Performed by: HOSPITALIST

## 2024-06-27 PROCEDURE — 6360000004 HC RX CONTRAST MEDICATION: Performed by: PHYSICIAN ASSISTANT

## 2024-06-27 PROCEDURE — 71260 CT THORAX DX C+: CPT

## 2024-06-27 PROCEDURE — 99232 SBSQ HOSP IP/OBS MODERATE 35: CPT | Performed by: PHYSICIAN ASSISTANT

## 2024-06-27 PROCEDURE — 83735 ASSAY OF MAGNESIUM: CPT

## 2024-06-27 RX ORDER — GUAIFENESIN 600 MG/1
600 TABLET, EXTENDED RELEASE ORAL 2 TIMES DAILY
Qty: 14 TABLET | Refills: 0 | COMMUNITY
Start: 2024-06-27

## 2024-06-27 RX ORDER — ALBUTEROL SULFATE 90 UG/1
2 AEROSOL, METERED RESPIRATORY (INHALATION) EVERY 6 HOURS PRN
Qty: 1 EACH | Refills: 3 | Status: SHIPPED | OUTPATIENT
Start: 2024-06-27

## 2024-06-27 RX ORDER — PANTOPRAZOLE SODIUM 40 MG/10ML
40 INJECTION, POWDER, LYOPHILIZED, FOR SOLUTION INTRAVENOUS 2 TIMES DAILY
Status: DISCONTINUED | OUTPATIENT
Start: 2024-06-27 | End: 2024-06-29

## 2024-06-27 RX ADMIN — PIPERACILLIN AND TAZOBACTAM 3375 MG: 3; .375 INJECTION, POWDER, FOR SOLUTION INTRAVENOUS at 18:50

## 2024-06-27 RX ADMIN — PANTOPRAZOLE SODIUM 40 MG: 40 INJECTION, POWDER, FOR SOLUTION INTRAVENOUS at 15:01

## 2024-06-27 RX ADMIN — TICAGRELOR 90 MG: 90 TABLET ORAL at 20:14

## 2024-06-27 RX ADMIN — AMIODARONE HYDROCHLORIDE 0.5 MG/MIN: 1.8 INJECTION, SOLUTION INTRAVENOUS at 21:48

## 2024-06-27 RX ADMIN — SODIUM PHOSPHATE, MONOBASIC, MONOHYDRATE AND SODIUM PHOSPHATE, DIBASIC, ANHYDROUS 15 MMOL: 142; 276 INJECTION, SOLUTION INTRAVENOUS at 01:21

## 2024-06-27 RX ADMIN — TICAGRELOR 90 MG: 90 TABLET ORAL at 10:34

## 2024-06-27 RX ADMIN — AMIODARONE HYDROCHLORIDE 0.5 MG/MIN: 1.8 INJECTION, SOLUTION INTRAVENOUS at 10:30

## 2024-06-27 RX ADMIN — GUAIFENESIN 600 MG: 600 TABLET, EXTENDED RELEASE ORAL at 20:14

## 2024-06-27 RX ADMIN — METOPROLOL SUCCINATE 50 MG: 50 TABLET, EXTENDED RELEASE ORAL at 10:34

## 2024-06-27 RX ADMIN — AMIODARONE HYDROCHLORIDE 0.5 MG/MIN: 1.8 INJECTION, SOLUTION INTRAVENOUS at 12:16

## 2024-06-27 RX ADMIN — SODIUM CHLORIDE, PRESERVATIVE FREE 10 ML: 5 INJECTION INTRAVENOUS at 20:16

## 2024-06-27 RX ADMIN — TIOTROPIUM BROMIDE AND OLODATEROL 2 PUFF: 3.124; 2.736 SPRAY, METERED RESPIRATORY (INHALATION) at 10:04

## 2024-06-27 RX ADMIN — PIPERACILLIN AND TAZOBACTAM 3375 MG: 3; .375 INJECTION, POWDER, FOR SOLUTION INTRAVENOUS at 06:30

## 2024-06-27 RX ADMIN — ASPIRIN 81 MG 81 MG: 81 TABLET ORAL at 10:34

## 2024-06-27 RX ADMIN — PANTOPRAZOLE SODIUM 40 MG: 40 INJECTION, POWDER, FOR SOLUTION INTRAVENOUS at 20:16

## 2024-06-27 RX ADMIN — ATORVASTATIN CALCIUM 40 MG: 40 TABLET, FILM COATED ORAL at 20:14

## 2024-06-27 RX ADMIN — GADOTERIDOL 10 ML: 279.3 INJECTION, SOLUTION INTRAVENOUS at 18:10

## 2024-06-27 RX ADMIN — ALBUTEROL SULFATE 2.5 MG: 2.5 SOLUTION RESPIRATORY (INHALATION) at 01:37

## 2024-06-27 RX ADMIN — GUAIFENESIN 600 MG: 600 TABLET, EXTENDED RELEASE ORAL at 10:34

## 2024-06-27 RX ADMIN — AMIODARONE HYDROCHLORIDE 150 MG: 1.5 INJECTION, SOLUTION INTRAVENOUS at 04:26

## 2024-06-27 RX ADMIN — AMIODARONE HYDROCHLORIDE 1 MG/MIN: 1.8 INJECTION, SOLUTION INTRAVENOUS at 04:37

## 2024-06-27 RX ADMIN — IOPAMIDOL 80 ML: 755 INJECTION, SOLUTION INTRAVENOUS at 12:43

## 2024-06-27 RX ADMIN — FLUTICASONE PROPIONATE 1 SPRAY: 50 SPRAY, METERED NASAL at 10:34

## 2024-06-27 ASSESSMENT — PAIN SCALES - GENERAL
PAINLEVEL_OUTOF10: 4
PAINLEVEL_OUTOF10: 2

## 2024-06-27 ASSESSMENT — PAIN DESCRIPTION - LOCATION
LOCATION: RIB CAGE
LOCATION: CHEST

## 2024-06-27 NOTE — PROGRESS NOTES
[facility]  INPATIENT PHYSICAL THERAPY  DAILY NOTE  STRZ CCU-STEPDOWN 3B - 3B-25/025-A    Time In: 1411  Time Out: 1430  Timed Code Treatment Minutes: 19 Minutes  Minutes: 19          Date: 2024  Patient Name: Erin Hanna,  Gender:  female        MRN: [id number]  : 1945  (79 y.o.)     Referring Practitioner: Gifty Castle APRN - CNP  Diagnosis: STEMI  Additional Pertinent Hx: Per EMR: 89-year-old female with history of hypertension and CKD 3A presenting with worsening shortness of breath.  This been going on for the past few days.  Woke up very early this morning with severe shortness of breath.  Denied any chest pain/arm pain/jaw pain at that time.  Denied palpitations.  Denied nausea or vomiting.  Was brought to the ED, EKG showed inferolateral ST elevations, elevated troponin at 136.  No prior history of stents.  Interventional cardiology consulted, patient taken for emergent cath, PCI w/ SANTA x 3 to RCA. s/p BRONCHOSCOPY ENDOBRONCHIAL ULTRASOUND      Prior Level of Function:  Lives With: Alone  Type of Home: House (Duplex)  Home Layout: Two level (Daughter lives on 1st floor, patient prefers the 2nd story)  Home Access: Stairs to enter without rails  Entrance Stairs - Number of Steps: 3 JORDYN without handrails, but planning to install them. Full flight of steps to 2nd story with 1 handrail.  Home Equipment: None   Bathroom Shower/Tub: Tub/Shower unit  Bathroom Toilet: Standard  Bathroom Equipment: None    ADL Assistance: Independent  Homemaking Assistance: Independent  Homemaking Responsibilities: Yes (Takes care of her own housekeeping needs.)  Ambulation Assistance: Independent  Transfer Assistance: Independent  Active : Yes  Additional Comments: Patient reporting she was IND PTA without use of an AD, was not on oxygen PTA. Patient states her daughter works during the day but has granddtr here from [place] and thinking about staying for the summer to assist if  needed.    Restrictions/Precautions:  Restrictions/Precautions: Fall Risk     SUBJECTIVE: Pt in bathroom/commode upon arrival, and agrees to therapy, RN approved session, pt reporting fatigue and a rough morning     PAIN: no c/o pain    Vitals: Vitals not assessed per clinical judgement, see nursing flowsheet    OBJECTIVE:  Bed Mobility:  Not Tested    Transfers:  Sit to Stand: Contact Guard Assistance  Stand to Sit:Contact Guard Assistance    Ambulation:  Contact Guard Assistance, with verbal cues , with increased time for completion  Distance: 12ft  Surface: Level Tile  Device: Rolling Walker  Gait Deviations:  Forward Flexed Posture, Slow Marcella, Decreased Step Length Bilaterally, Decreased Gait Speed, Decreased Heel Strike Bilaterally, Decreased Terminal Knee Extension, and Increased reliance on assistive device    Balance:  Pt completed functional tasks at toilet with assist for stability and safety. Various functional tasks completed with and without UE support in sitting and standing, pt needed assist for task completion, extra time to complete    Exercise:  Patient was guided in 1 set(s) 10 reps of exercise to both lower extremities.  Glut sets, Seated marches, Seated heel/toe raises, Long arc quads, Seated isometric hip adduction, and Seated abduction/adduction.  Exercises were completed for increased independence with functional mobility.    Functional Outcome Measures:  [facility] (6 CLICK) BASIC MOBILITY  AM-PAC Inpatient Mobility Raw Score : 17  AM-PAC Inpatient T-Scale Score : 42.13  Mobility Inpatient CMS 0-100% Score: 50.57  Mobility Inpatient CMS G-Code Modifier : CK        Modified [place] Scale:  Not Applicable    ASSESSMENT:  Assessment: Patient progressing toward established goals. Pt demonstrates impairments with strength, balance, endurance, activity tolerance, independence, requires hands on assistance for safety with mobility and would benefit from continued skilled PT improve these impairments,

## 2024-06-27 NOTE — PLAN OF CARE
Problem: Discharge Planning  Goal: Discharge to home or other facility with appropriate resources  6/27/2024 0012 by Juan Antonio Cortes RN  Outcome: Progressing  Flowsheets (Taken 6/27/2024 0012)  Discharge to home or other facility with appropriate resources: Identify barriers to discharge with patient and caregiver     Problem: ABCDS Injury Assessment  Goal: Absence of physical injury  6/27/2024 0012 by Juan Antonio Cortes RN  Outcome: Progressing  Flowsheets (Taken 6/27/2024 0012)  Absence of Physical Injury: Implement safety measures based on patient assessment     Problem: Safety - Adult  Goal: Free from fall injury  6/27/2024 0012 by Juan Antonio Cortes RN  Outcome: Progressing  Flowsheets (Taken 6/27/2024 0012)  Free From Fall Injury: Instruct family/caregiver on patient safety     Problem: Cardiovascular - Adult  Goal: Maintains optimal cardiac output and hemodynamic stability  6/27/2024 0012 by Juan Antonio Cortes RN  Outcome: Progressing  Flowsheets (Taken 6/27/2024 0012)  Maintains optimal cardiac output and hemodynamic stability: Monitor blood pressure and heart rate     Problem: Cardiovascular - Adult  Goal: Absence of cardiac dysrhythmias or at baseline  6/27/2024 0012 by Juan Antonio Cortes RN  Outcome: Progressing  Flowsheets (Taken 6/27/2024 0012)  Absence of cardiac dysrhythmias or at baseline:   Monitor cardiac rate and rhythm   Assess for signs of decreased cardiac output     Problem: Skin/Tissue Integrity - Adult  Goal: Skin integrity remains intact  6/27/2024 0012 by Juan Antonio Cortes RN  Outcome: Progressing  Flowsheets (Taken 6/27/2024 0012)  Skin Integrity Remains Intact: Monitor for areas of redness and/or skin breakdown     Problem: Pain  Goal: Verbalizes/displays adequate comfort level or baseline comfort level  6/27/2024 0012 by Juan Antonio Cortes RN  Outcome: Progressing  Flowsheets (Taken 6/27/2024 0012)  Verbalizes/displays adequate comfort level or baseline comfort

## 2024-06-27 NOTE — PROCEDURES
PROCEDURE NOTE  Date: 6/27/2024   Name: Erin Hanna  YOB: 1945    Procedures  12 lead EKG completed. Results handed to Thea KLEIN.

## 2024-06-27 NOTE — CARE COORDINATION
6/27/24, 1:15 PM EDT    DISCHARGE PLANNING EVALUATION    Met with Erin this morning about discharge plan. She told SW that her son is reaching out to her granddaughter in [place] to see when she can come stay.  Erin said her family that is local is also willing to help provide 24 hour care. She does have the list for home health agencies by has not decided which agency she would like at discharge.

## 2024-06-27 NOTE — TELEPHONE ENCOUNTER
----- Message from Dakota Calabrese, JARROD - CNP sent at 6/27/2024  2:39 PM EDT -----    2 months with CXR and Full PFT prior o appt     Thank you,   Dakota Calabrese

## 2024-06-27 NOTE — PROGRESS NOTES
[facility]    Patient - Erin Hanna   MRN -  [id number]   [facility] - [id number]   - 1945      Date of Admission -  2024  4:37 AM  Date of evaluation -  2024  Room - 3B-025-A   Hospital Day - 10  Consulting - Antonio Patricia MD Primary Care Physician - Claudine Gutierrez APRN - CNP   Reason for Consult:   SOB, RLL lung mass   HPI   History obtained from Patient and EMR    Erin Hanna is a 79 y.o. female admitted for STEMI . S/p PCI to RCA . Ischemic cardiomyopathy.  -ECHO done showed EF of 40-45%  Pneumonia panel positive for  staph aureus.  -As per pharmacy its MSSA so vancomycin was not given and patient continued on Augmentin.  -Patient reports chronic daily cough, currently worse.  At home would expel little phlegm in mornings, now expelling blood tinged sputum frequently   Denies pain   No personal history of cancer .   History of COPD, diagnosed many years ago . On home inhalers, she can not recall names,prescribed by primary care.   -Antibiotics to Zosyn as patient had poor response to Augmentin  -CT  chest.done showed 8.3 x 5.6 x 6 cm mass in the right lower lobe.Diffuse COPD and thickening of the interstitial lung markings.  -Pulmonology consulted.  Occupation: retired.      Erin Hanna denies history of exposure to Asbestos or Silicone.She denies history of exposure to coal, foundry, quarry, or significant farm dust exposure.  Patient denies any recent travel history.  Patient denies history of exposure to birds, pigeons, or chickens in the past. The patient denies pet animals at home. She had none  in the home.  She  denies to TB exposure in the past.  She denies to history of recreational or IV drug use.     EBUS completed by Dr. Lugo 2024    Subjective/Events Past 24 hours/ROS   -On 3 LPM via NC   -Afebrile reports ongoing cough with intermittent sputum production   -Poor appetite did not eat breakfast BMI 17,  Atrium: Left atrium is severely dilated.    Right Atrium: Right atrium is moderately dilated.    IVC/SVC: IVC diameter is greater than 21 mm and decreases less than 50% during inspiration; therefore the estimated right atrial pressure is elevated (~15 mmHg). IVC is dilated.    Image quality is technically difficult. Technically difficult study due to patient's heart rhythm and procedure performed with the patient in a sitting position.     Radiology      CT Scans    CT CHEST WO CONTRAST    COMPARISON: Chest x-ray dated 6/22/2024..    There is mild cardiomegaly. There is mitral valve calcification. There is a  probable stent in the right coronary artery.. There is atherosclerotic  calcification in the thoracic aorta.. There is no gross abnormality of the  pulmonary artery. There is no mediastinal, axillary or hilar adenopathy. There  is no pericardial or pleural effusion.      There is diffuse COPD and thickening of the interstitial lung markings. There is  a superimposed mass in the right lower lobe measuring 8.3 x 5.6 x 6 cm in size.     There is thoracic spondylosis. . There is a mild compression deformity involving  the T10 vertebral body.     IMPRESSION:     1. 8.3 x 5.6 x 6 cm mass in the right lower lobe.  2. Diffuse COPD and thickening of the interstitial lung markings.  3. Mild cardiomegaly. Mitral valve calcification. Probable stent in the right  coronary artery.  4. Thoracic spondylosis. Mild compression deformity involving the T10 vertebral  body.         Assessment   -8.3 x 5.6 x 6 cm mass in the right lower lobe S/p EBUS by Dr. Tone Rosales, DO 24 June 2024- Malika is positive for Squamous cell cancer at the station 10 R and right lower lobe lung mass.  -MSSA pneumonia on antibiotic therapy per primary service  -STEMI s/p PCI RCA 6/18/24 , on anticoagulant with Brillinta, cardiology following   -Acute combined Systolic and diastolic  CHF- LVEF 40-45%  -PHTN- Combination WHO group 2/3 RVSP 61  -COPD,

## 2024-06-27 NOTE — PROGRESS NOTES
Attempted midline placement x 2 by DEIRDRE Lassiter RN and x1 by ирина RN. Attempts unsuccessful. IV placed for access.

## 2024-06-27 NOTE — PROGRESS NOTES
Hospitalist Progress Note    Patient:  Erin Hanna      Unit/Bed:3B-25/025-A    YOB: 1945    MRN: [id number]       Acct: [id number]     PCP: Claudine Gutierrez APRN - CNP    Date of Admission: 6/17/2024    Assessment/Plan:    STEMI: Patient status post PCI with SANTA to RCA x 3.  Continue with Brilinta and aspirin.  Appreciate cardiology input.  Continue with metoprolol 50 mg daily, atorvastatin 40 mg daily  Ischemic cardiomyopathy: Ejection fraction 40 to 45%.  Does not appear to be volume overloaded during acute CHF exacerbation.  GDMT.  Right lower lobe mass with collapse: Patient status post EBUS 6/24/2024.  Biopsy back today is positive for non-small cell carcinoma.  Discussed with pulmonology will consult with radiation oncology.  Continue with supplemental O2.  Aggressive pulmonary toiletry.  Radiation oncology note patient will evaluate for possible urgent radiation after further imaging done.  MSSA pneumonia: Continue with antibiotics, currently on IV Zosyn x 2 more days, so far cultures only growing out MSSA but given right lung collapse escalate antibiotics to cover for anaerobes  CKD stage III: Avoid nephrotoxic medications and monitor renal function.  Hypertension: Continue with metoprolol 50 mg daily, monitor blood pressure   Anemia: Hemoglobin on slow downward trend with darker stools.  Patient is on Brilinta for recent stent placement.  Monitor H&H and transfuse as needed.  High risk to stop Brilinta at this time.  Will start patient on Protonix IV twice daily.  COPD: As needed bronchodilators.  History of smoking  VTE prophylaxis: SCDs, restart heparin subcu if no further procedures or biopsies anticipate        Subjective (past 24 hours):   Patient seen and examined at bedside, states that she is doing okay she reports she is not having a poor appetite because interrupted frequently during meals and has not been eating well.  No reports of shortness of breath while at  8.6 8.7 8.3*   PHOS  --  1.9* 3.3       No results for input(s): \"AST\", \"ALT\", \"BILIDIR\", \"BILITOT\", \"ALKPHOS\" in the last 72 hours.  No results for input(s): \"INR\" in the last 72 hours.  No results for input(s): \"CKTOTAL\", \"TROPONINI\" in the last 72 hours.  No results for input(s): \"PROCAL\" in the last 72 hours.    Microbiology:      Urinalysis:      Lab Results   Component Value Date/Time    NITRU NEGATIVE 11/14/2016 06:40 PM    BLOODU NEGATIVE 11/14/2016 06:40 PM    GLUCOSEU NEGATIVE 11/14/2016 06:40 PM       Radiology:  Echo (TTE) complete (PRN contrast/bubble/strain/3D)    Result Date: 6/18/2024    Left Ventricle: Mildly reduced left ventricular systolic function with a visually estimated EF of 40 - 45%. Left ventricle size is normal. Normal wall thickness. Mild global hypokinesis present. Grade I diastolic dysfunction with normal LAP.   Aortic Valve: Trileaflet valve. Moderately thickened cusp. Moderately calcified cusp.   Mitral Valve: Moderately thickened leaflet, at the posterior leaflet. Moderately calcified leaflet, at the posterior leaflet.   Tricuspid Valve: Mild regurgitation. The estimated RVSP is 61 mmHg. The estimated RVSP is 61 mmHg.   Left Atrium: Left atrium is severely dilated.   Right Atrium: Right atrium is moderately dilated.   IVC/SVC: IVC diameter is greater than 21 mm and decreases less than 50% during inspiration; therefore the estimated right atrial pressure is elevated (~15 mmHg). IVC is dilated.   Image quality is technically difficult. Technically difficult study due to patient's heart rhythm and procedure performed with the patient in a sitting position.     XR CHEST PORTABLE    Result Date: 6/18/2024  PROCEDURE: XR CHEST PORTABLE CLINICAL INFORMATION: SOB COMPARISON: 6/17/2024 TECHNIQUE: A single mobile view of the chest was obtained.     1. Borderline heart size. Soft tissue fullness projected over the right side of the cardiac silhouette, probably due to a moderate size hiatus

## 2024-06-27 NOTE — PLAN OF CARE
Problem: Respiratory - Adult  Goal: Clear lung sounds  Outcome: Progressing   Patient mutually agreed on goals.

## 2024-06-27 NOTE — CARE COORDINATION
6/27/24, 1:16 PM EDT    DISCHARGE ON GOING EVALUATION    Erin ALDRIDGE Hanna       [facility] day: 10  Location: [address] [place] [address]-A Reason for admit: STEMI (ST elevation myocardial infarction) (HCC) [I21.3]  ST elevation myocardial infarction (STEMI), unspecified artery (HCC) [I21.3]     Procedures:   6/17 [facility] with Dr. Fuller: Successful PCI of RCA STEMI using OCT guidance followed by 3 overlapping SANTA.   6/18 Echo: EF 40-45%. Mild global hypokinesis of left ventricle. Aortic valve: Moderately thickened and calcified cusp. Mitral valve: Moderately thickened and calcified leaflet, at the posterior leaflet. Left atrium: severely dilated. Right atrium: moderately dilated.   6/24 EBUS with Dr. Lugo: Endobronchial ultrasound with biopsy of mass and lymph nodes (3 nodes) and 1 mass.   Station 4L 1.5 cm - tbna x 3 - slick negative  Station 7 2 cm - tbna x 3 - slick lymphocytes  Station 4R - small < 1cm - no biopsy  Station 10 R - large 2.5 cm - SLICK: positive non-small  Right lower lobe mass large > 4cm - SLICK: positive non-small.    Imaging since last note:   6/27 CT chest: pending.   6/27 CT Abd/pelvis: pending.   6/27 MRI Brain: ordered.     Barriers to Discharge: Hospitalist, Cardiology and Pulmonology following. Cytology positive for squamous cell cancer. Consulted Palliative Care, Radiation Oncology and Oncology. Cardiology planning to follow prn. PT/OT. Pt had ectopy overnight and Amiodarone gtt was started. Protonix iv bid. Zosyn iv q8hr.     PCP: Caludine Gutierrez APRN - CNP  Readmission Risk Score: 12.1    Patient Goals/Plan/Treatment Preferences: From home alone. Family lives in attached duplex. SW following.      Intraparenchymal hemorrhage of brain

## 2024-06-27 NOTE — CONSULTS
[facility]    Patient - Erin Hanna   MRN -  [id number]   [facility] # - [id number]   - 1945      Date of Admission -  2024  4:37 AM  Date of evaluation -  2024  Room - 3B-025-A   Hospital Day - 10  Consulting - Antonio Patricia MD Primary Care Physician - Claudine Gutierrez APRN - CNP     Inpatient consult to Oncology  Consult performed by: Yanira Roberson APRN - CNP  Consult ordered by: Dakota Calabrese APRN - CNP         Reason for Consult    Newly diagnosed squamous cell carcinoma  Active Hospital Problem List      Active Hospital Problems    Diagnosis Date Noted    Lung collapse [J98.19] 2024    Tobacco smoke exposure [Z77.22] 2024    Mediastinal lymphadenopathy [R59.0] 2024    Lung mass [R91.8] 2024    Severe malnutrition (HCC) [E43] 2024     Class: Acute    STEMI (ST elevation myocardial infarction) (HCC) [I21.3] 2024     HPI   Erin Hanna is a 79 y.o. female admitted for worsening shortness of breath that had been progressively worsening over the few days prior to admission. She woke up on 24 with severe shortness of breath and chest pain. She was brought to ER and EKG revealed inferolateral ST elevations and elevated troponin. She underwent coronary angiography on 24. XR at the time of admission revealed Right cardiophrenic sulcus mass versus pneumonia confirmed by CT scan. She underwent biopsy via EBUS on 24 with Dr. Lugo. Pathology revealed:  FINAL RESULTS:   A: 4L lymph node, endobronchial ultrasound, fine needle aspiration   (EBUS-FNA):    No malignant cells seen.    Specimen consists of small, benign appearing lymphocytes and   anthracotic macrophages.     B: Station 7 lymph node, EBUS-FNA:    No malignant cells seen.    Specimen consists of small, benign appearing lymphocytes and   anthracotic macrophages.     C: 10R lymph node, EBUS-FNA:    POSITIVE FOR MALIGNANCY.    Findings are consistent  There is a probable stent in the right coronary artery.. There is atherosclerotic calcification in the thoracic aorta.. There is no gross abnormality of the pulmonary artery. There is no mediastinal, axillary or hilar adenopathy. There is no pericardial or pleural effusion. There is diffuse COPD and thickening of the interstitial lung markings. There is a superimposed mass in the right lower lobe measuring 8.3 x 5.6 x 6 cm in size. There is thoracic spondylosis. . There is a mild compression deformity involving the T10 vertebral body.      1. 8.3 x 5.6 x 6 cm mass in the right lower lobe. 2. Diffuse COPD and thickening of the interstitial lung markings. 3. Mild cardiomegaly. Mitral valve calcification. Probable stent in the right coronary artery. 4. Thoracic spondylosis. Mild compression deformity involving the T10 vertebral body. **This report has been created using voice recognition software. It may contain minor errors which are inherent in voice recognition technology.** Electronically signed by Dr. Tyler Rush    XR CHEST (2 VW)    Result Date: 6/20/2024  PROCEDURE: XR CHEST (2 VW) CLINICAL INFORMATION: SOB COMPARISON: 6/18/2024 TECHNIQUE: PA and lateral views of the chest were obtained.     1. Normal heart size. Relatively large hiatus hernia versus posterior mediastinal mass. CT thorax would be helpful for further evaluation. 2.. 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 Mildly increased thoracic kyphosis. 3. Mild interstitial pneumonitis/fibrosis right lung base. Prior cholecystectomy. **This report has been created using voice recognition software.  It may contain minor errors which are inherent in voice recognition technology.** Electronically signed by Dr. Johnny Gutierres    Echo (TTE) complete (PRN contrast/bubble/strain/3D)    Result Date: 6/18/2024    Left Ventricle: Mildly reduced left ventricular systolic function with a visually estimated EF of 40 - 45%. Left ventricle size is normal.

## 2024-06-27 NOTE — PROCEDURES
PROCEDURE NOTE  Date: 6/26/2024   Name: Erin Hanna  YOB: 1945    Procedures    12 lead EKG completed. Results handed to Thea KLEIN.

## 2024-06-27 NOTE — CONSULTS
on file   Food Insecurity: No Food Insecurity (6/17/2024)    Hunger Vital Sign     Worried About Running Out of Food in the Last Year: Never true     Ran Out of Food in the Last Year: Never true   Transportation Needs: No Transportation Needs (6/17/2024)    PRAPARE - Transportation     Lack of Transportation (Medical): No     Lack of Transportation (Non-Medical): No   Physical Activity: Not on file   Stress: Not on file   Social Connections: Not on file   Intimate Partner Violence: Not on file   Housing Stability: Low Risk  (6/17/2024)    Housing Stability Vital Sign     Unable to Pay for Housing in the Last Year: No     Number of Places Lived in the Last Year: 1     Unstable Housing in the Last Year: No     Exposure to Industrial/ environmental Carcinogens: no      ALLERGIES: No Known Allergies       Current Facility-Administered Medications   Medication Dose Route Frequency Provider Last Rate Last Admin    amiodarone (NEXTERONE) 360 mg in dextrose 5% 200 ml  0.5 mg/min IntraVENous Continuous Kim Melchor PA-C 16.7 mL/hr at 06/27/24 1030 0.5 mg/min at 06/27/24 1030    tiotropium-olodaterol (STIOLTO) 2.5-2.5 MCG/ACT inhaler 2 puff  2 puff Inhalation Daily Dakota Calabrese APRN - CNP   2 puff at 06/27/24 1004    albuterol (PROVENTIL) (2.5 MG/3ML) 0.083% nebulizer solution 2.5 mg  2.5 mg Nebulization Q6H PRN Dakota Calabrese APRN - CNP   2.5 mg at 06/27/24 0137    sodium phosphate 15 mmol in sodium chloride 0.9 % 250 mL IVPB  15 mmol IntraVENous PRN Yanira Barrios APRN - CNP   Stopped at 06/27/24 0437    guaiFENesin (MUCINEX) extended release tablet 600 mg  600 mg Oral BID Heidi Thomas MD   600 mg at 06/27/24 1034    metoprolol succinate (TOPROL XL) extended release tablet 50 mg  50 mg Oral Daily Gifty Castle APRN - CNP   50 mg at 06/27/24 1034    fluticasone (FLONASE) 50 MCG/ACT nasal spray 1 spray  1 spray Each Nostril Daily Heidi Thomas MD   1 spray at 06/27/24 8025     piperacillin-tazobactam (ZOSYN) 3,375 mg in sodium chloride 0.9 % 50 mL IVPB (mini-bag)  3,375 mg IntraVENous Q8H Heidi Thomas MD   Stopped at 06/27/24 0640    ALPRAZolam (XANAX) tablet 0.25 mg  0.25 mg Oral Once Kim Melchor PA-C        potassium chloride (KLOR-CON M) extended release tablet 40 mEq  40 mEq Oral PRN Heidi Thomas MD        Or    potassium bicarb-citric acid (EFFER-K) effervescent tablet 40 mEq  40 mEq Oral PRN Heidi Thomas MD        Or    potassium chloride 10 mEq/100 mL IVPB (Peripheral Line)  10 mEq IntraVENous PRN Heidi Thomas MD        potassium chloride (KLOR-CON) extended release tablet 20 mEq  20 mEq Oral PRN Heidi Thomas MD   20 mEq at 06/23/24 1414    nitroGLYCERIN (NITROSTAT) SL tablet 0.4 mg  0.4 mg SubLINGual Q5 Min PRN Mortimer, Connor, PA-C        sodium chloride flush 0.9 % injection 5-40 mL  5-40 mL IntraVENous 2 times per day Brock Fuller MD   10 mL at 06/26/24 0850    sodium chloride flush 0.9 % injection 5-40 mL  5-40 mL IntraVENous PRN Brock Fuller MD        0.9 % sodium chloride infusion   IntraVENous PRN Brock Fuller MD        acetaminophen (TYLENOL) tablet 650 mg  650 mg Oral Q4H PRN Brock Fuller MD   650 mg at 06/25/24 1743    aspirin chewable tablet 81 mg  81 mg Oral Daily Brock Fuller MD   81 mg at 06/27/24 1034    ticagrelor (BRILINTA) tablet 90 mg  90 mg Oral BID Brock Fuller MD   90 mg at 06/27/24 1034    atorvastatin (LIPITOR) tablet 40 mg  40 mg Oral Nightly Jose Joe DO   40 mg at 06/26/24 2024     No outpatient medications have been marked as taking for the 6/17/24 encounter (Hospital Encounter).            LABORATORY STUDIES:   CBC:   Lab Results   Component Value Date/Time    WBC 9.5 06/27/2024 03:25 AM    RBC 3.73 06/27/2024 03:25 AM    HGB 11.3 06/27/2024 03:25 AM    HCT 35.6 06/27/2024 03:25 AM    MCV 95.4 06/27/2024 03:25 AM    MCH 30.3 06/27/2024 03:25 AM    MCHC 31.7 06/27/2024 03:25 AM    RDW 15.3

## 2024-06-27 NOTE — PROGRESS NOTES
Cardiology Progress Note      Patient:  Erin Hanna  YOB: 1945  MRN: [id number]   Acct: [id number]  Admit Date:  6/17/2024  Primary Cardiologist:  none    Note per dr moreno \"CHIEF COMPLAINT: FERRARI        HPI: This is a pleasant 78 y.o. female with hx of HTN who presents with progressive dyspnea and sob.  Has been having severe sob for the last couple days.  Tonight, she awoke with sudden onset of sob.  No chest pain, arm pain, jaw pain, or palpitations.  Daughter brought her to the ED.  ECG show inferolateral ST-elevations but there is also concern for anterior elevations.  She has no f/c/ns.  No sick contacts.  No prior autoimmune diseases.  ED did POCUS, no obvious fluid, ED preserved.  Cr 1.2.  Hgb 13.  She is not having significant discomfort now, however ST elevation persists.     Subjective (Events in last 24 hours):   Called last night due to increased ectopy on tele - PVCs, and frequent episodes NSVT - electrolytes normal - amio bolus and gtt started.  No further ectopy this morning  Pt awake and alert.  NAD. No cp.  Mild sob. No edema or orthopnea  On 3 l/min O2  On amio gtt    Net I/o +2.9L - inaccurate    Objective:   BP (!) 116/59   Pulse 70   Temp 97.8 °F (36.6 °C) (Oral)   Resp 18   Ht 1.6 m (5' 3\")   Wt 45.5 kg (100 lb 5 oz)   SpO2 97%   BMI 17.77 kg/m²        TELEMETRY: nsr, did have frequent short episodes of NSVT overnight, improved since amio gtt    Physical Exam:  General Appearance: alert and oriented to person, place and time, in no acute distress  Cardiovascular: normal rate, regular rhythm, normal S1 and S2, no murmurs, rubs, clicks, or gallops, distal pulses intact, no carotid bruits, no JVD  Pulmonary/Chest: diminished throughout, no wheezing or crackles, few rhonchi  Abdomen: soft, non-tender, non-distended, normal bowel sounds, no masses Extremities: no cyanosis, clubbing or edema, pulse   Skin: warm and dry, no rash or erythema  Head: normocephalic and

## 2024-06-27 NOTE — PLAN OF CARE
Problem: Discharge Planning  Goal: Discharge to home or other facility with appropriate resources  Outcome: Progressing  Flowsheets (Taken 6/27/2024 0012 by Juan Antonio Cortes, RN)  Discharge to home or other facility with appropriate resources: Identify barriers to discharge with patient and caregiver  Note: Plans home with home health. Has family help.      Problem: ABCDS Injury Assessment  Goal: Absence of physical injury  Outcome: Progressing  Flowsheets (Taken 6/27/2024 0012 by Juan Antonio Cortes, RN)  Absence of Physical Injury: Implement safety measures based on patient assessment  Note: Bed locked & in low position, call light in reach, side-rails up x2, bed/chair alarm utilized, non-slip socks on when ambulating, reminded patient to use call light to call for assistance.       Problem: Safety - Adult  Goal: Free from fall injury  Outcome: Progressing       Problem: Cardiovascular - Adult  Goal: Maintains optimal cardiac output and hemodynamic stability  Outcome: Progressing  Flowsheets (Taken 6/27/2024 1703)  Maintains optimal cardiac output and hemodynamic stability: Monitor blood pressure and heart rate  Note: Amiodarone drip continues. Ectopy has resolved        Problem: Pain  Goal: Verbalizes/displays adequate comfort level or baseline comfort level  Outcome: Progressing       Problem: Nutrition Deficit:  Goal: Optimize nutritional status  Outcome: Progressing        Care plan reviewed with patient.  Patient verbalizes understanding of the care plan and contributed to goal setting.

## 2024-06-27 NOTE — PALLIATIVE CARE
Follow Up / Progress Note        Patient:   Erin Hanna  YOB: 1945  Age:  79 y.o.  Room:  [address] [place] [address]  MRN:  [id number]           Chart reviewed. Noted that since this RN last visited patient, biopsy has resulted. positive for non-small cell carcinoma. Radiation and Medical oncology consulted.     Family/Patient Discussion:  In room to speak with patient. Patient sitting up in chair. Patient states she is feeling overwhelmed. Support given. Patient had not yet discussed this with family. While this RN was in room, patient's daughter and granddaughter entered room. Patient updated them on biopsy results. Support given to family. Discussed plan for MRI and radiation/medical oncology consults. Patient immediately stated she would not want radiation. Discussed side effects. Patient also stated she would not want chemo. Patient is okay to proceed with MRI and staging workup. Encouraged patient and family to take today to digest information. Informed patient/family that this RN is available to assist in any way they need, encouraged all to call if they had questions. Informed patient that this RN would follow up with her tomorrow.       Plan/Follow-Up:  Patient overwhelmed today. Informed one of her daughters of biopsy results. Plan to revisit patient tomorrow.          Electronically signed by Jennifer Bower RN on 6/27/2024 at 3:56 PM             Palliative Care Office: [phone]

## 2024-06-28 LAB
ANION GAP SERPL CALC-SCNC: 8 MEQ/L (ref 8–16)
BASOPHILS ABSOLUTE: 0 THOU/MM3 (ref 0–0.1)
BASOPHILS NFR BLD AUTO: 0.1 %
BUN SERPL-MCNC: 19 MG/DL (ref 7–22)
CALCIUM SERPL-MCNC: 8.3 MG/DL (ref 8.5–10.5)
CHLORIDE SERPL-SCNC: 104 MEQ/L (ref 98–111)
CO2 SERPL-SCNC: 28 MEQ/L (ref 23–33)
CREAT SERPL-MCNC: 0.7 MG/DL (ref 0.4–1.2)
DEPRECATED RDW RBC AUTO: 49.2 FL (ref 35–45)
EOSINOPHIL NFR BLD AUTO: 0.1 %
EOSINOPHILS ABSOLUTE: 0 THOU/MM3 (ref 0–0.4)
ERYTHROCYTE [DISTWIDTH] IN BLOOD BY AUTOMATED COUNT: 14.1 % (ref 11.5–14.5)
GFR SERPL CREATININE-BSD FRML MDRD: 88 ML/MIN/1.73M2
GLUCOSE SERPL-MCNC: 121 MG/DL (ref 70–108)
HCT VFR BLD AUTO: 33.5 % (ref 37–47)
HGB BLD-MCNC: 10.6 GM/DL (ref 12–16)
IMM GRANULOCYTES # BLD AUTO: 0.05 THOU/MM3 (ref 0–0.07)
IMM GRANULOCYTES NFR BLD AUTO: 0.5 %
LYMPHOCYTES ABSOLUTE: 0.7 THOU/MM3 (ref 1–4.8)
LYMPHOCYTES NFR BLD AUTO: 6.9 %
MCH RBC QN AUTO: 29.9 PG (ref 26–33)
MCHC RBC AUTO-ENTMCNC: 31.6 GM/DL (ref 32.2–35.5)
MCV RBC AUTO: 94.6 FL (ref 81–99)
MONOCYTES ABSOLUTE: 0.5 THOU/MM3 (ref 0.4–1.3)
MONOCYTES NFR BLD AUTO: 5.4 %
NEUTROPHILS ABSOLUTE: 8.5 THOU/MM3 (ref 1.8–7.7)
NEUTROPHILS NFR BLD AUTO: 87 %
NRBC BLD AUTO-RTO: 0 /100 WBC
PLATELET # BLD AUTO: 133 THOU/MM3 (ref 130–400)
PMV BLD AUTO: 11.8 FL (ref 9.4–12.4)
POTASSIUM SERPL-SCNC: 4 MEQ/L (ref 3.5–5.2)
RBC # BLD AUTO: 3.54 MILL/MM3 (ref 4.2–5.4)
SCAN OF BLOOD SMEAR: NORMAL
SODIUM SERPL-SCNC: 140 MEQ/L (ref 135–145)
WBC # BLD AUTO: 9.8 THOU/MM3 (ref 4.8–10.8)

## 2024-06-28 PROCEDURE — 6360000002 HC RX W HCPCS: Performed by: INTERNAL MEDICINE

## 2024-06-28 PROCEDURE — 80048 BASIC METABOLIC PNL TOTAL CA: CPT

## 2024-06-28 PROCEDURE — 6370000000 HC RX 637 (ALT 250 FOR IP): Performed by: HOSPITALIST

## 2024-06-28 PROCEDURE — 6360000002 HC RX W HCPCS: Performed by: HOSPITALIST

## 2024-06-28 PROCEDURE — 2700000000 HC OXYGEN THERAPY PER DAY

## 2024-06-28 PROCEDURE — 2580000003 HC RX 258: Performed by: INTERNAL MEDICINE

## 2024-06-28 PROCEDURE — 6370000000 HC RX 637 (ALT 250 FOR IP)

## 2024-06-28 PROCEDURE — 99232 SBSQ HOSP IP/OBS MODERATE 35: CPT | Performed by: HOSPITALIST

## 2024-06-28 PROCEDURE — 94640 AIRWAY INHALATION TREATMENT: CPT

## 2024-06-28 PROCEDURE — 6370000000 HC RX 637 (ALT 250 FOR IP): Performed by: INTERNAL MEDICINE

## 2024-06-28 PROCEDURE — 2140000000 HC CCU INTERMEDIATE R&B

## 2024-06-28 PROCEDURE — C9113 INJ PANTOPRAZOLE SODIUM, VIA: HCPCS | Performed by: HOSPITALIST

## 2024-06-28 PROCEDURE — 97110 THERAPEUTIC EXERCISES: CPT

## 2024-06-28 PROCEDURE — 85025 COMPLETE CBC W/AUTO DIFF WBC: CPT

## 2024-06-28 PROCEDURE — 94761 N-INVAS EAR/PLS OXIMETRY MLT: CPT

## 2024-06-28 PROCEDURE — 36415 COLL VENOUS BLD VENIPUNCTURE: CPT

## 2024-06-28 RX ORDER — AMIODARONE HYDROCHLORIDE 200 MG/1
200 TABLET ORAL DAILY
Status: DISCONTINUED | OUTPATIENT
Start: 2024-06-28 | End: 2024-07-06 | Stop reason: HOSPADM

## 2024-06-28 RX ADMIN — TICAGRELOR 90 MG: 90 TABLET ORAL at 10:34

## 2024-06-28 RX ADMIN — PANTOPRAZOLE SODIUM 40 MG: 40 INJECTION, POWDER, FOR SOLUTION INTRAVENOUS at 10:44

## 2024-06-28 RX ADMIN — FLUTICASONE PROPIONATE 1 SPRAY: 50 SPRAY, METERED NASAL at 10:33

## 2024-06-28 RX ADMIN — SODIUM CHLORIDE, PRESERVATIVE FREE 10 ML: 5 INJECTION INTRAVENOUS at 19:41

## 2024-06-28 RX ADMIN — ASPIRIN 81 MG 81 MG: 81 TABLET ORAL at 10:33

## 2024-06-28 RX ADMIN — PANTOPRAZOLE SODIUM 40 MG: 40 INJECTION, POWDER, FOR SOLUTION INTRAVENOUS at 19:42

## 2024-06-28 RX ADMIN — TIOTROPIUM BROMIDE AND OLODATEROL 2 PUFF: 3.124; 2.736 SPRAY, METERED RESPIRATORY (INHALATION) at 08:22

## 2024-06-28 RX ADMIN — AMIODARONE HYDROCHLORIDE 200 MG: 200 TABLET ORAL at 10:33

## 2024-06-28 RX ADMIN — ATORVASTATIN CALCIUM 40 MG: 40 TABLET, FILM COATED ORAL at 19:40

## 2024-06-28 RX ADMIN — TICAGRELOR 90 MG: 90 TABLET ORAL at 19:40

## 2024-06-28 RX ADMIN — SODIUM CHLORIDE, PRESERVATIVE FREE 10 ML: 5 INJECTION INTRAVENOUS at 10:54

## 2024-06-28 RX ADMIN — ACETAMINOPHEN 650 MG: 325 TABLET ORAL at 19:40

## 2024-06-28 RX ADMIN — GUAIFENESIN 600 MG: 600 TABLET, EXTENDED RELEASE ORAL at 10:34

## 2024-06-28 RX ADMIN — PIPERACILLIN AND TAZOBACTAM 3375 MG: 3; .375 INJECTION, POWDER, FOR SOLUTION INTRAVENOUS at 10:50

## 2024-06-28 RX ADMIN — PIPERACILLIN AND TAZOBACTAM 3375 MG: 3; .375 INJECTION, POWDER, FOR SOLUTION INTRAVENOUS at 03:35

## 2024-06-28 RX ADMIN — PIPERACILLIN AND TAZOBACTAM 3375 MG: 3; .375 INJECTION, POWDER, FOR SOLUTION INTRAVENOUS at 19:37

## 2024-06-28 RX ADMIN — GUAIFENESIN 600 MG: 600 TABLET, EXTENDED RELEASE ORAL at 19:40

## 2024-06-28 ASSESSMENT — PAIN DESCRIPTION - DESCRIPTORS: DESCRIPTORS: ACHING

## 2024-06-28 ASSESSMENT — PAIN DESCRIPTION - LOCATION: LOCATION: HEAD

## 2024-06-28 ASSESSMENT — PAIN DESCRIPTION - ONSET: ONSET: GRADUAL

## 2024-06-28 ASSESSMENT — PAIN SCALES - GENERAL
PAINLEVEL_OUTOF10: 3
PAINLEVEL_OUTOF10: 0

## 2024-06-28 ASSESSMENT — PAIN DESCRIPTION - FREQUENCY: FREQUENCY: INTERMITTENT

## 2024-06-28 ASSESSMENT — PAIN - FUNCTIONAL ASSESSMENT: PAIN_FUNCTIONAL_ASSESSMENT: ACTIVITIES ARE NOT PREVENTED

## 2024-06-28 ASSESSMENT — PAIN DESCRIPTION - ORIENTATION: ORIENTATION: POSTERIOR

## 2024-06-28 ASSESSMENT — PAIN DESCRIPTION - PAIN TYPE: TYPE: ACUTE PAIN

## 2024-06-28 NOTE — PROGRESS NOTES
[facility]  INPATIENT PHYSICAL THERAPY  DAILY NOTE  STRZ CCU-STEPDOWN 3B - 3B-25/025-A    Time In: 1420  Time Out: 1443  Timed Code Treatment Minutes: 23 Minutes  Minutes: 23          Date: 2024  Patient Name: Erin Hanna,  Gender:  female        MRN: [id number]  : 1945  (79 y.o.)     Referring Practitioner: Gifty Castle APRN - CNP  Diagnosis: STEMI  Additional Pertinent Hx: Per EMR: 89-year-old female with history of hypertension and CKD 3A presenting with worsening shortness of breath.  This been going on for the past few days.  Woke up very early this morning with severe shortness of breath.  Denied any chest pain/arm pain/jaw pain at that time.  Denied palpitations.  Denied nausea or vomiting.  Was brought to the ED, EKG showed inferolateral ST elevations, elevated troponin at 136.  No prior history of stents.  Interventional cardiology consulted, patient taken for emergent cath, PCI w/ SANTA x 3 to RCA. s/p BRONCHOSCOPY ENDOBRONCHIAL ULTRASOUND      Prior Level of Function:  Lives With: Alone  Type of Home: House (Duplex)  Home Layout: Two level (Daughter lives on 1st floor, patient prefers the 2nd story)  Home Access: Stairs to enter without rails  Entrance Stairs - Number of Steps: 3 JORDYN without handrails, but planning to install them. Full flight of steps to 2nd story with 1 handrail.  Home Equipment: None   Bathroom Shower/Tub: Tub/Shower unit  Bathroom Toilet: Standard  Bathroom Equipment: None    ADL Assistance: Independent  Homemaking Assistance: Independent  Homemaking Responsibilities: Yes (Takes care of her own housekeeping needs.)  Ambulation Assistance: Independent  Transfer Assistance: Independent  Active : Yes  Additional Comments: Patient reporting she was IND PTA without use of an AD, was not on oxygen PTA. Patient states her daughter works during the day but has granddtr here from [place] and thinking about staying for the summer to assist if  needed.    Restrictions/Precautions:  Restrictions/Precautions: Fall Risk     SUBJECTIVE: Pt in recliner upon arrival, and agrees to therapy, RN approved session, Pt A&O X 4/4, Pt flat but cooperative    PAIN: R side abdomen    Vitals: Vitals not assessed per clinical judgement, see nursing flowsheet  Vitals:    06/28/24 1135   BP: (!) 102/57   Pulse: 61   Resp: 19   Temp: 97.4 °F (36.3 °C)   SpO2: 100%     OBJECTIVE:  Pt politely declined ambulation    Exercise:  Patient was guided in 1 set(s) 10 reps of exercise to both lower extremities.  Ankle pumps, Glut sets, Quad sets, Heelslides, Hip abduction/adduction, and Straight leg raises. Rest breaks given prn due to fatigue. Exercises were completed for increased independence with functional mobility.    Functional Outcome Measures:  [facility] (6 CLICK) BASIC MOBILITY  AM-PAC Inpatient Mobility Raw Score : 17  AM-PAC Inpatient T-Scale Score : 42.13  Mobility Inpatient CMS 0-100% Score: 50.57  Mobility Inpatient CMS G-Code Modifier : CK        Modified Horacio Scale:  Not Applicable    ASSESSMENT:  Assessment: Patient progressing toward established goals. Pt demonstrates impairments with strength, endurance, activity tolerance and would benefit from continued skilled PT to maximize independence.    Activity Tolerance:  Patient tolerance of  treatment: fair. Fatigues quickly with activity     Equipment Recommendations:Equipment Needed: Yes (would benefit from RW at discharge)  Discharge Recommendations: 24 hour assistance or supervision and Patient would benefit from continued PT at discharge  Plan: General Plan:  (5x)    Education  Education:  Learners: Patient  Patient Education: Plan of Care, Verbal Exercise Instruction, Health Promotion and Wellness Education    Goals:  Patient Goals : Pt goal to return home  Short Term Goals  Time Frame for Short Term Goals: By discharge  Short Term Goal 1: Supine to/from sit at mod I to get in/out of bed.  Short Term Goal 2: Sit

## 2024-06-28 NOTE — CARE COORDINATION
6/28/24, 2:36 PM EDT    DISCHARGE PLANNING EVALUATION    Patient still unsure what home health agency she wants at discharge.  She has a list.     Update 3:38 pm: Spoke with patient and a niece.  Family will help her chose a home health agency.  They have SW number to call with their choice.

## 2024-06-28 NOTE — PLAN OF CARE
Problem: Discharge Planning  Goal: Discharge to home or other facility with appropriate resources  6/27/2024 2235 by Emili López RN  Outcome: Progressing  Flowsheets (Taken 6/27/2024 2235)  Discharge to home or other facility with appropriate resources:   Identify barriers to discharge with patient and caregiver   Identify discharge learning needs (meds, wound care, etc)   Refer to discharge planning if patient needs post-hospital services based on physician order or complex needs related to functional status, cognitive ability or social support system   Arrange for needed discharge resources and transportation as appropriate     Problem: ABCDS Injury Assessment  Goal: Absence of physical injury  6/27/2024 2235 by Emili López RN  Outcome: Progressing  Flowsheets (Taken 6/27/2024 2235)  Absence of Physical Injury: Implement safety measures based on patient assessment  Note: Bed locked & in low position, call light in reach, side-rails up x2, bed/chair alarm utilized, non-slip socks on when ambulating, reminded patient to use call light to call for assistance.      Problem: Safety - Adult  Goal: Free from fall injury  6/27/2024 2235 by Emili López RN  Outcome: Progressing  Flowsheets (Taken 6/27/2024 2235)  Free From Fall Injury: Instruct family/caregiver on patient safety  Note: Bed locked & in low position, call light in reach, side-rails up x2, bed/chair alarm utilized, non-slip socks on when ambulating, reminded patient to use call light to call for assistance.      Problem: Respiratory - Adult  Goal: Clear lung sounds  6/27/2024 1011 by Jaxon Mtz RCP  Outcome: Progressing     Problem: Cardiovascular - Adult  Goal: Maintains optimal cardiac output and hemodynamic stability  6/27/2024 2235 by Emili López RN  Outcome: Progressing  Flowsheets (Taken 6/27/2024 2235)  Maintains optimal cardiac output and hemodynamic stability:   Monitor blood pressure and heart rate   Monitor urine output

## 2024-06-28 NOTE — PROGRESS NOTES
STEMI using OCT guidance followed by 3 overlapping SANTA       DVT prophylaxis: [] Lovenox                                 [] SCDs                                 [] SQ Heparin                                 [] Encourage ambulation           [] Already on Anticoagulation     Code Status: Full Code    Tele:   [] yes             [] no    Active Hospital Problems    Diagnosis Date Noted    Lung collapse [J98.19] 06/27/2024    Tobacco smoke exposure [Z77.22] 06/24/2024    Mediastinal lymphadenopathy [R59.0] 06/24/2024    Right lower lobe lung mass [R91.8] 06/22/2024    Severe malnutrition (HCC) [E43] 06/20/2024     Class: Acute    STEMI (ST elevation myocardial infarction) (HCC) [I21.3] 06/17/2024       Electronically signed by Antonio Patricia MD on 6/28/2024 at 9:27 AM

## 2024-06-28 NOTE — PLAN OF CARE
Problem: Discharge Planning  Goal: Discharge to home or other facility with appropriate resources  Outcome: Progressing  Flowsheets (Taken 6/28/2024 1703)  Discharge to home or other facility with appropriate resources: Identify barriers to discharge with patient and caregiver     Problem: ABCDS Injury Assessment  Goal: Absence of physical injury  Outcome: Progressing  Flowsheets (Taken 6/28/2024 1703)  Absence of Physical Injury: Implement safety measures based on patient assessment     Problem: Safety - Adult  Goal: Free from fall injury  Outcome: Progressing  Flowsheets (Taken 6/28/2024 1703)  Free From Fall Injury: Instruct family/caregiver on patient safety     Problem: Cardiovascular - Adult  Goal: Maintains optimal cardiac output and hemodynamic stability  Outcome: Progressing  Flowsheets (Taken 6/28/2024 1703)  Maintains optimal cardiac output and hemodynamic stability:   Monitor blood pressure and heart rate   Monitor urine output and notify Licensed Independent Practitioner for values outside of normal range     Problem: Pain  Goal: Verbalizes/displays adequate comfort level or baseline comfort level  Outcome: Progressing  Flowsheets (Taken 6/28/2024 1703)  Verbalizes/displays adequate comfort level or baseline comfort level:   Encourage patient to monitor pain and request assistance   Assess pain using appropriate pain scale   Care plan reviewed with patient.  Patient verbalizes understanding of the care plan and contributed to goal setting.

## 2024-06-28 NOTE — PROGRESS NOTES
This  attempted to visit Erin, a 79 year old admitted on 3B 25. Patient is laying in her bed, se is alone, not family is present. Patient states that she is admitted due to lung cancer. Patient said she is hopeful and prefer to  be sent home. Patient states her Taoism donavon is helping her focus and hopeful. Patient asked for prayer during my visit with her. Prayer is offered in response.   This  also provided active listening, nurtured hope and words of encouragement.   This  will follow up for continue and emotional support as needed.

## 2024-06-28 NOTE — PALLIATIVE CARE
Follow Up / Progress Note        Patient:   Erin Hanna  YOB: 1945  Age:  79 y.o.  Room:  [address] [place] [address]  MRN:  [id number]           On unit to round on patient. Per primary RN, no family at bedside. Patient continuing to be overwhelmed. Will return later this afternoon in hopes of family being at bedside.          Electronically signed by Jennifer Bower RN on 6/28/2024 at 1:08 PM             Palliative Care Office: [phone]

## 2024-06-29 LAB
ABO: NORMAL
ANION GAP SERPL CALC-SCNC: 8 MEQ/L (ref 8–16)
ANTIBODY SCREEN: NORMAL
BASOPHILS ABSOLUTE: 0 THOU/MM3 (ref 0–0.1)
BASOPHILS NFR BLD AUTO: 0 %
BUN SERPL-MCNC: 17 MG/DL (ref 7–22)
CALCIUM SERPL-MCNC: 8.1 MG/DL (ref 8.5–10.5)
CHLORIDE SERPL-SCNC: 106 MEQ/L (ref 98–111)
CO2 SERPL-SCNC: 26 MEQ/L (ref 23–33)
CREAT SERPL-MCNC: 0.7 MG/DL (ref 0.4–1.2)
DEPRECATED RDW RBC AUTO: 48.2 FL (ref 35–45)
EKG ATRIAL RATE: 78 BPM
EKG P AXIS: 63 DEGREES
EKG P-R INTERVAL: 120 MS
EKG Q-T INTERVAL: 364 MS
EKG QRS DURATION: 90 MS
EKG QTC CALCULATION (BAZETT): 442 MS
EKG R AXIS: 9 DEGREES
EKG T AXIS: 63 DEGREES
EKG VENTRICULAR RATE: 89 BPM
EOSINOPHIL NFR BLD AUTO: 0.1 %
EOSINOPHILS ABSOLUTE: 0 THOU/MM3 (ref 0–0.4)
ERYTHROCYTE [DISTWIDTH] IN BLOOD BY AUTOMATED COUNT: 13.9 % (ref 11.5–14.5)
GFR SERPL CREATININE-BSD FRML MDRD: 88 ML/MIN/1.73M2
GLUCOSE SERPL-MCNC: 109 MG/DL (ref 70–108)
HCT VFR BLD AUTO: 27.1 % (ref 37–47)
HCT VFR BLD AUTO: 27.3 % (ref 37–47)
HCT VFR BLD AUTO: 28.2 % (ref 37–47)
HGB BLD-MCNC: 8.5 GM/DL (ref 12–16)
HGB BLD-MCNC: 8.5 GM/DL (ref 12–16)
HGB BLD-MCNC: 8.6 GM/DL (ref 12–16)
IMM GRANULOCYTES # BLD AUTO: 0.03 THOU/MM3 (ref 0–0.07)
IMM GRANULOCYTES NFR BLD AUTO: 0.4 %
LYMPHOCYTES ABSOLUTE: 0.7 THOU/MM3 (ref 1–4.8)
LYMPHOCYTES NFR BLD AUTO: 8 %
MCH RBC QN AUTO: 29.8 PG (ref 26–33)
MCHC RBC AUTO-ENTMCNC: 31.4 GM/DL (ref 32.2–35.5)
MCV RBC AUTO: 95.1 FL (ref 81–99)
MONOCYTES ABSOLUTE: 0.6 THOU/MM3 (ref 0.4–1.3)
MONOCYTES NFR BLD AUTO: 6.8 %
NEUTROPHILS ABSOLUTE: 6.9 THOU/MM3 (ref 1.8–7.7)
NEUTROPHILS NFR BLD AUTO: 84.7 %
NRBC BLD AUTO-RTO: 0 /100 WBC
PLATELET # BLD AUTO: 116 THOU/MM3 (ref 130–400)
PMV BLD AUTO: 12.1 FL (ref 9.4–12.4)
POTASSIUM SERPL-SCNC: 3.6 MEQ/L (ref 3.5–5.2)
RBC # BLD AUTO: 2.85 MILL/MM3 (ref 4.2–5.4)
RH FACTOR: NORMAL
SODIUM SERPL-SCNC: 140 MEQ/L (ref 135–145)
WBC # BLD AUTO: 8.2 THOU/MM3 (ref 4.8–10.8)

## 2024-06-29 PROCEDURE — 99232 SBSQ HOSP IP/OBS MODERATE 35: CPT | Performed by: NURSE PRACTITIONER

## 2024-06-29 PROCEDURE — 6360000002 HC RX W HCPCS: Performed by: HOSPITALIST

## 2024-06-29 PROCEDURE — 86900 BLOOD TYPING SEROLOGIC ABO: CPT

## 2024-06-29 PROCEDURE — 6370000000 HC RX 637 (ALT 250 FOR IP): Performed by: INTERNAL MEDICINE

## 2024-06-29 PROCEDURE — 2580000003 HC RX 258: Performed by: HOSPITALIST

## 2024-06-29 PROCEDURE — 93005 ELECTROCARDIOGRAM TRACING: CPT | Performed by: INTERNAL MEDICINE

## 2024-06-29 PROCEDURE — 93010 ELECTROCARDIOGRAM REPORT: CPT | Performed by: NUCLEAR MEDICINE

## 2024-06-29 PROCEDURE — 86901 BLOOD TYPING SEROLOGIC RH(D): CPT

## 2024-06-29 PROCEDURE — 6370000000 HC RX 637 (ALT 250 FOR IP): Performed by: HOSPITALIST

## 2024-06-29 PROCEDURE — 2580000003 HC RX 258: Performed by: INTERNAL MEDICINE

## 2024-06-29 PROCEDURE — 99233 SBSQ HOSP IP/OBS HIGH 50: CPT | Performed by: HOSPITALIST

## 2024-06-29 PROCEDURE — 80048 BASIC METABOLIC PNL TOTAL CA: CPT

## 2024-06-29 PROCEDURE — 85025 COMPLETE CBC W/AUTO DIFF WBC: CPT

## 2024-06-29 PROCEDURE — 36415 COLL VENOUS BLD VENIPUNCTURE: CPT

## 2024-06-29 PROCEDURE — C9113 INJ PANTOPRAZOLE SODIUM, VIA: HCPCS | Performed by: HOSPITALIST

## 2024-06-29 PROCEDURE — 6370000000 HC RX 637 (ALT 250 FOR IP): Performed by: NURSE PRACTITIONER

## 2024-06-29 PROCEDURE — 85018 HEMOGLOBIN: CPT

## 2024-06-29 PROCEDURE — 6370000000 HC RX 637 (ALT 250 FOR IP): Performed by: PHYSICIAN ASSISTANT

## 2024-06-29 PROCEDURE — 6370000000 HC RX 637 (ALT 250 FOR IP)

## 2024-06-29 PROCEDURE — 2140000000 HC CCU INTERMEDIATE R&B

## 2024-06-29 PROCEDURE — 85014 HEMATOCRIT: CPT

## 2024-06-29 PROCEDURE — 86850 RBC ANTIBODY SCREEN: CPT

## 2024-06-29 PROCEDURE — 93005 ELECTROCARDIOGRAM TRACING: CPT | Performed by: HOSPITALIST

## 2024-06-29 RX ORDER — CLOPIDOGREL BISULFATE 75 MG/1
75 TABLET ORAL DAILY
Status: DISCONTINUED | OUTPATIENT
Start: 2024-06-29 | End: 2024-06-29

## 2024-06-29 RX ORDER — BENZONATATE 100 MG/1
200 CAPSULE ORAL 3 TIMES DAILY PRN
Status: DISCONTINUED | OUTPATIENT
Start: 2024-06-29 | End: 2024-07-06 | Stop reason: HOSPADM

## 2024-06-29 RX ORDER — GUAIFENESIN/DEXTROMETHORPHAN 100-10MG/5
5 SYRUP ORAL EVERY 4 HOURS PRN
Status: DISCONTINUED | OUTPATIENT
Start: 2024-06-29 | End: 2024-07-06 | Stop reason: HOSPADM

## 2024-06-29 RX ORDER — LIDOCAINE 4 G/G
1 PATCH TOPICAL DAILY
Status: DISCONTINUED | OUTPATIENT
Start: 2024-06-30 | End: 2024-06-29

## 2024-06-29 RX ORDER — LIDOCAINE 4 G/G
1 PATCH TOPICAL DAILY
Status: DISCONTINUED | OUTPATIENT
Start: 2024-06-29 | End: 2024-07-04

## 2024-06-29 RX ADMIN — AMIODARONE HYDROCHLORIDE 200 MG: 200 TABLET ORAL at 07:49

## 2024-06-29 RX ADMIN — GUAIFENESIN 600 MG: 600 TABLET, EXTENDED RELEASE ORAL at 20:44

## 2024-06-29 RX ADMIN — PHENOL 1 SPRAY: 1.5 LIQUID ORAL at 21:31

## 2024-06-29 RX ADMIN — TICAGRELOR 90 MG: 90 TABLET ORAL at 07:50

## 2024-06-29 RX ADMIN — SODIUM CHLORIDE, PRESERVATIVE FREE 10 ML: 5 INJECTION INTRAVENOUS at 07:51

## 2024-06-29 RX ADMIN — GUAIFENESIN 600 MG: 600 TABLET, EXTENDED RELEASE ORAL at 07:50

## 2024-06-29 RX ADMIN — TICAGRELOR 90 MG: 90 TABLET ORAL at 20:44

## 2024-06-29 RX ADMIN — ATORVASTATIN CALCIUM 40 MG: 40 TABLET, FILM COATED ORAL at 20:44

## 2024-06-29 RX ADMIN — FLUTICASONE PROPIONATE 1 SPRAY: 50 SPRAY, METERED NASAL at 07:52

## 2024-06-29 RX ADMIN — PANTOPRAZOLE SODIUM 8 MG/HR: 40 INJECTION, POWDER, FOR SOLUTION INTRAVENOUS at 20:45

## 2024-06-29 RX ADMIN — ASPIRIN 81 MG 81 MG: 81 TABLET ORAL at 07:50

## 2024-06-29 RX ADMIN — TIOTROPIUM BROMIDE AND OLODATEROL 2 PUFF: 3.124; 2.736 SPRAY, METERED RESPIRATORY (INHALATION) at 13:30

## 2024-06-29 RX ADMIN — PANTOPRAZOLE SODIUM 40 MG: 40 INJECTION, POWDER, FOR SOLUTION INTRAVENOUS at 07:59

## 2024-06-29 RX ADMIN — PANTOPRAZOLE SODIUM 8 MG/HR: 40 INJECTION, POWDER, FOR SOLUTION INTRAVENOUS at 12:06

## 2024-06-29 NOTE — PLAN OF CARE
Problem: Discharge Planning  Goal: Discharge to home or other facility with appropriate resources  6/29/2024 0000 by Emili López RN  Outcome: Progressing  Flowsheets (Taken 6/29/2024 0000)  Discharge to home or other facility with appropriate resources:   Identify barriers to discharge with patient and caregiver   Refer to discharge planning if patient needs post-hospital services based on physician order or complex needs related to functional status, cognitive ability or social support system   Identify discharge learning needs (meds, wound care, etc)   Arrange for needed discharge resources and transportation as appropriate     Problem: ABCDS Injury Assessment  Goal: Absence of physical injury  6/29/2024 0000 by Emili López RN  Outcome: Progressing  Flowsheets (Taken 6/29/2024 0000)  Absence of Physical Injury: Implement safety measures based on patient assessment  Note: Bed locked & in low position, call light in reach, side-rails up x2, bed/chair alarm utilized, non-slip socks on when ambulating, reminded patient to use call light to call for assistance.      Problem: Safety - Adult  Goal: Free from fall injury  6/29/2024 0000 by Emili López RN  Outcome: Progressing  Flowsheets (Taken 6/29/2024 0000)  Free From Fall Injury: Instruct family/caregiver on patient safety  Note: Bed locked & in low position, call light in reach, side-rails up x2, bed/chair alarm utilized, non-slip socks on when ambulating, reminded patient to use call light to call for assistance.      Problem: Cardiovascular - Adult  Goal: Maintains optimal cardiac output and hemodynamic stability  6/29/2024 0000 by Emili López RN  Outcome: Progressing  Flowsheets (Taken 6/29/2024 0000)  Maintains optimal cardiac output and hemodynamic stability:   Monitor blood pressure and heart rate   Monitor urine output and notify Licensed Independent Practitioner for values outside of normal range   Assess for signs of decreased cardiac  output  Note: Ongoing assessment & interventions provided throughout shift.  Patient on continuous telemetry monitoring, heart tones, vitals & pulses checked at least 3 times per shift & PRN. Vitals within acceptable limits.  Peripheral pulses palpable.      Problem: Cardiovascular - Adult  Goal: Absence of cardiac dysrhythmias or at baseline  6/29/2024 0000 by Emili López RN  Outcome: Progressing  Flowsheets (Taken 6/29/2024 0000)  Absence of cardiac dysrhythmias or at baseline:   Monitor cardiac rate and rhythm   Assess for signs of decreased cardiac output  Note: Ongoing assessment & interventions provided throughout shift.  Patient on continuous telemetry monitoring, heart tones, vitals & pulses checked at least 3 times per shift & PRN. Vitals within acceptable limits.  Peripheral pulses palpable.      Problem: Skin/Tissue Integrity - Adult  Goal: Skin integrity remains intact  6/29/2024 0000 by Emili López RN  Outcome: Progressing  Flowsheets (Taken 6/29/2024 0000)  Skin Integrity Remains Intact: Monitor for areas of redness and/or skin breakdown  Note: Ongoing assessment & interventions provided throughout shift.  Skin assessments provided.  Encouraging/assisting patient to turn as needed.      Problem: Pain  Goal: Verbalizes/displays adequate comfort level or baseline comfort level  6/29/2024 0000 by Emili López RN  Outcome: Progressing  Flowsheets (Taken 6/29/2024 0000)  Verbalizes/displays adequate comfort level or baseline comfort level:   Encourage patient to monitor pain and request assistance   Administer analgesics based on type and severity of pain and evaluate response   Consider cultural and social influences on pain and pain management   Assess pain using appropriate pain scale   Implement non-pharmacological measures as appropriate and evaluate response  Note: Ongoing assessment & interventions provided throughout shift.  Reminded patient to report any pain, pressure, or shortness of

## 2024-06-29 NOTE — PROGRESS NOTES
Hospitalist Progress Note    Patient:  Erin Hanna      Unit/Bed:3B-25/025-A    YOB: 1945    MRN: [id number]       Acct: [id number]     PCP: Claudine Gutierrez APRN - CNP    Date of Admission: 6/17/2024    Assessment/Plan:    STEMI: Patient status post PCI with SANTA to RCA x 3.  Continue with Brilinta and aspirin.  Appreciate cardiology input.  Continue with metoprolol 50 mg daily, atorvastatin 40 mg daily  Ischemic cardiomyopathy: Ejection fraction 40 to 45%.  Does not appear to be volume overloaded during acute CHF exacerbation.  GDMT.  Right lower lobe mass with collapse: Patient status post EBUS 6/24/2024.  Biopsy is positive for non-small cell carcinoma.  Discussed with pulmonology will consult with radiation oncology.  Continue with supplemental O2.  Aggressive pulmonary toiletry.  Radiation oncology note patient will evaluate for possible urgent radiation if patient agreeable  MSSA pneumonia: post obstructive pneumonia. Continue with antibiotics, currently on IV Zosyn x 2 more days, so far cultures only growing out MSSA but given right lung collapse escalate antibiotics to cover for anaerobes  CKD stage III: Avoid nephrotoxic medications and monitor renal function.  Hypertension: Continue with metoprolol 50 mg daily, monitor blood pressure   Melena and acute blood loss anemia: Hemoglobin continues to drop down to 8.5 today despite starting on IV Protonix.  Patient is high risk given the recent PCI stenting and needs aspirin and Brilinta.  Discussed with cardiology today continue to trend and transfuse for hemoglobin less than 8.  GI consulted, will monitor if patient continues to drop her hemoglobin or shows signs of melena then will need EGD and bridging with cangrelor and heparin drip.  Type and screen, her H&H every 8.  Change from IV twice daily Protonix to drip.  COPD: As needed bronchodilators.  History of smoking  VTE prophylaxis: SCDs, restart heparin subcu if no further

## 2024-06-29 NOTE — PROGRESS NOTES
stenosis post intervention.        Left Heart    Left Ventricle The left ventricular size is normal. There is mild left ventricular systolic dysfunction. LV systolic pressure is normal. The estimated EF = 45 - 50%.     Coronary Diagram    Diagnostic  Dominance: Right    Intervention      Lab Data:    Cardiac Enzymes:  No results for input(s): \"CKTOTAL\", \"CKMB\", \"CKMBINDEX\", \"TROPONINI\" in the last 72 hours.    CBC:   Lab Results   Component Value Date/Time    WBC 8.2 06/29/2024 06:13 AM    RBC 2.85 06/29/2024 06:13 AM    HGB 8.5 06/29/2024 06:13 AM    HCT 27.1 06/29/2024 06:13 AM     06/29/2024 06:13 AM       CMP:    Lab Results   Component Value Date/Time     06/29/2024 06:13 AM    K 3.6 06/29/2024 06:13 AM    K 4.3 06/26/2024 09:06 PM     06/29/2024 06:13 AM    CO2 26 06/29/2024 06:13 AM    BUN 17 06/29/2024 06:13 AM    CREATININE 0.7 06/29/2024 06:13 AM    GFRAA 55 07/15/2021 10:28 AM    LABGLOM 88 06/29/2024 06:13 AM    LABGLOM 47 08/18/2023 10:24 AM    GLUCOSE 109 06/29/2024 06:13 AM    GLUCOSE 101 09/09/2022 11:29 AM    CALCIUM 8.1 06/29/2024 06:13 AM       Hepatic Function Panel:    Lab Results   Component Value Date/Time    ALKPHOS 74 06/17/2024 04:57 AM    ALT 9 06/17/2024 04:57 AM    AST 26 06/17/2024 04:57 AM    BILITOT 0.4 06/17/2024 04:57 AM    BILIDIR 0.2 06/17/2024 04:57 AM    IBILI 0.29 07/15/2021 10:28 AM       Magnesium:    Lab Results   Component Value Date/Time    MG 2.2 06/27/2024 03:25 AM       PT/INR:    Lab Results   Component Value Date/Time    INR 1.06 06/17/2024 04:57 AM       HgBA1c:    Lab Results   Component Value Date/Time    LABA1C 6.3 06/18/2024 03:59 AM       FLP:    Lab Results   Component Value Date/Time    TRIG 74 06/18/2024 03:59 AM    HDL 64 06/18/2024 03:59 AM       TSH:    Lab Results   Component Value Date/Time    TSH 0.446 06/27/2024 03:25 AM         Assessment:    Melena   Possible GIB         Inferior stemi    Bradycardia with 3.27 sec pause 6/18/24--->  none since     SR with NSVT noted - rare     8.3 x 5.6 x 6 cm mass in the right lower lobe   by CT chest -  NSCLC     Sp cardiac cath 6/17/24  S/p PCI of the RCA x 3 SANTA with OCT guidance   - Residual 50% LAD, 40% Lcx, 40% RPDA     ICDMP ef 45-50% cath  40-45% echo 6/18/24      HTN stable   HLP LDL 60      ANNA / CKD stage 3a - back to baseline     COPD  / tobacco abuse       Plan:  Discussed with Dr. Patricia / Shantelle / Dr. Quinteros   hgb slowly dropping over last few days - on protonix iv - hgb still dropping - pt with melena     GI wants to do colonoscopy and wants ASA / brilinta stopped if can - discussed with Dr Quinteros who wants to continue asa / brilinta and recheck HGB later -- if pt becomes unstable from anemia then stop antiplatelet agents -- pt high risk of ISR     Please keep HB above 8 with known CAD      Patient and provider goals: Begin Cardiac Rehab and Start exercise program       Cardiac Rehab: Yes      Discharge Medications for PCI/MI (performed or attempted):   ASA: yes  Statin: yes  P2Y12 Inhibitor: yes  Beta Blocker: yes  ACE / ARB: no  low BP   Nitro SL: yes      Discharge Medications for ICD, Cardiomyopathy, CHF:  Beta Blocker: yes  ACE Inhibitor/ARB: not at present - low BP         Electronically signed by JARROD Ferreira CNP on 6/29/2024 at 11:26 AM

## 2024-06-29 NOTE — PROCEDURES
PROCEDURE NOTE  Date: 6/29/2024   Name: Erin Hanna  YOB: 1945    Procedures  12 lead EKG completed. Results handed to Shruti Ta CET

## 2024-06-29 NOTE — PLAN OF CARE
Problem: Discharge Planning  Goal: Discharge to home or other facility with appropriate resources  Outcome: Progressing  Flowsheets (Taken 6/29/2024 1803)  Discharge to home or other facility with appropriate resources: Identify barriers to discharge with patient and caregiver     Problem: ABCDS Injury Assessment  Goal: Absence of physical injury  Outcome: Progressing  Flowsheets (Taken 6/29/2024 1803)  Absence of Physical Injury: Implement safety measures based on patient assessment     Problem: Safety - Adult  Goal: Free from fall injury  Outcome: Progressing  Flowsheets (Taken 6/29/2024 1803)  Free From Fall Injury: Instruct family/caregiver on patient safety     Problem: Cardiovascular - Adult  Goal: Maintains optimal cardiac output and hemodynamic stability  Outcome: Progressing  Flowsheets (Taken 6/29/2024 1803)  Maintains optimal cardiac output and hemodynamic stability: Monitor blood pressure and heart rate     Problem: Skin/Tissue Integrity - Adult  Goal: Skin integrity remains intact  Outcome: Progressing  Flowsheets (Taken 6/29/2024 1803)  Skin Integrity Remains Intact: Monitor for areas of redness and/or skin breakdown   Care plan reviewed with patient.  Patient verbalizes understanding of the care plan and contributed to goal setting.

## 2024-06-30 ENCOUNTER — APPOINTMENT (OUTPATIENT)
Dept: GENERAL RADIOLOGY | Age: 79
End: 2024-06-30
Payer: MEDICARE

## 2024-06-30 LAB
ANION GAP SERPL CALC-SCNC: 10 MEQ/L (ref 8–16)
BASOPHILS ABSOLUTE: 0 THOU/MM3 (ref 0–0.1)
BASOPHILS NFR BLD AUTO: 0 %
BUN SERPL-MCNC: 13 MG/DL (ref 7–22)
CALCIUM SERPL-MCNC: 8.1 MG/DL (ref 8.5–10.5)
CHLORIDE SERPL-SCNC: 107 MEQ/L (ref 98–111)
CO2 SERPL-SCNC: 27 MEQ/L (ref 23–33)
CREAT SERPL-MCNC: 0.7 MG/DL (ref 0.4–1.2)
DEPRECATED RDW RBC AUTO: 47.6 FL (ref 35–45)
EKG ATRIAL RATE: 75 BPM
EKG P AXIS: 77 DEGREES
EKG P-R INTERVAL: 116 MS
EKG Q-T INTERVAL: 398 MS
EKG QRS DURATION: 88 MS
EKG QTC CALCULATION (BAZETT): 444 MS
EKG R AXIS: 3 DEGREES
EKG T AXIS: 71 DEGREES
EKG VENTRICULAR RATE: 75 BPM
EOSINOPHIL NFR BLD AUTO: 0 %
EOSINOPHILS ABSOLUTE: 0 THOU/MM3 (ref 0–0.4)
ERYTHROCYTE [DISTWIDTH] IN BLOOD BY AUTOMATED COUNT: 13.9 % (ref 11.5–14.5)
GFR SERPL CREATININE-BSD FRML MDRD: 88 ML/MIN/1.73M2
GLUCOSE SERPL-MCNC: 109 MG/DL (ref 70–108)
HCT VFR BLD AUTO: 26.3 % (ref 37–47)
HCT VFR BLD AUTO: 27.6 % (ref 37–47)
HCT VFR BLD AUTO: 28.6 % (ref 37–47)
HGB BLD-MCNC: 8.4 GM/DL (ref 12–16)
HGB BLD-MCNC: 8.6 GM/DL (ref 12–16)
HGB BLD-MCNC: 8.8 GM/DL (ref 12–16)
IMM GRANULOCYTES # BLD AUTO: 0.07 THOU/MM3 (ref 0–0.07)
IMM GRANULOCYTES NFR BLD AUTO: 0.8 %
LYMPHOCYTES ABSOLUTE: 0.5 THOU/MM3 (ref 1–4.8)
LYMPHOCYTES NFR BLD AUTO: 5.2 %
MCH RBC QN AUTO: 30 PG (ref 26–33)
MCHC RBC AUTO-ENTMCNC: 31.9 GM/DL (ref 32.2–35.5)
MCV RBC AUTO: 93.9 FL (ref 81–99)
MONOCYTES ABSOLUTE: 0.6 THOU/MM3 (ref 0.4–1.3)
MONOCYTES NFR BLD AUTO: 6.4 %
NEUTROPHILS ABSOLUTE: 7.9 THOU/MM3 (ref 1.8–7.7)
NEUTROPHILS NFR BLD AUTO: 87.6 %
NRBC BLD AUTO-RTO: 0 /100 WBC
PLATELET # BLD AUTO: 129 THOU/MM3 (ref 130–400)
PMV BLD AUTO: 11.8 FL (ref 9.4–12.4)
POTASSIUM SERPL-SCNC: 3.7 MEQ/L (ref 3.5–5.2)
RBC # BLD AUTO: 2.8 MILL/MM3 (ref 4.2–5.4)
SODIUM SERPL-SCNC: 144 MEQ/L (ref 135–145)
WBC # BLD AUTO: 9 THOU/MM3 (ref 4.8–10.8)

## 2024-06-30 PROCEDURE — 80048 BASIC METABOLIC PNL TOTAL CA: CPT

## 2024-06-30 PROCEDURE — C9113 INJ PANTOPRAZOLE SODIUM, VIA: HCPCS | Performed by: HOSPITALIST

## 2024-06-30 PROCEDURE — 2580000003 HC RX 258: Performed by: HOSPITALIST

## 2024-06-30 PROCEDURE — 6370000000 HC RX 637 (ALT 250 FOR IP): Performed by: PHYSICIAN ASSISTANT

## 2024-06-30 PROCEDURE — 85018 HEMOGLOBIN: CPT

## 2024-06-30 PROCEDURE — 94640 AIRWAY INHALATION TREATMENT: CPT

## 2024-06-30 PROCEDURE — 6360000002 HC RX W HCPCS: Performed by: HOSPITALIST

## 2024-06-30 PROCEDURE — 94761 N-INVAS EAR/PLS OXIMETRY MLT: CPT

## 2024-06-30 PROCEDURE — 2140000000 HC CCU INTERMEDIATE R&B

## 2024-06-30 PROCEDURE — 71046 X-RAY EXAM CHEST 2 VIEWS: CPT

## 2024-06-30 PROCEDURE — 6370000000 HC RX 637 (ALT 250 FOR IP): Performed by: NURSE PRACTITIONER

## 2024-06-30 PROCEDURE — 85014 HEMATOCRIT: CPT

## 2024-06-30 PROCEDURE — 6370000000 HC RX 637 (ALT 250 FOR IP): Performed by: HOSPITALIST

## 2024-06-30 PROCEDURE — 6370000000 HC RX 637 (ALT 250 FOR IP): Performed by: INTERNAL MEDICINE

## 2024-06-30 PROCEDURE — 85025 COMPLETE CBC W/AUTO DIFF WBC: CPT

## 2024-06-30 PROCEDURE — 99232 SBSQ HOSP IP/OBS MODERATE 35: CPT | Performed by: HOSPITALIST

## 2024-06-30 PROCEDURE — 99232 SBSQ HOSP IP/OBS MODERATE 35: CPT | Performed by: NURSE PRACTITIONER

## 2024-06-30 PROCEDURE — 36415 COLL VENOUS BLD VENIPUNCTURE: CPT

## 2024-06-30 PROCEDURE — 2700000000 HC OXYGEN THERAPY PER DAY

## 2024-06-30 PROCEDURE — 6370000000 HC RX 637 (ALT 250 FOR IP)

## 2024-06-30 PROCEDURE — 93010 ELECTROCARDIOGRAM REPORT: CPT | Performed by: NUCLEAR MEDICINE

## 2024-06-30 RX ADMIN — TICAGRELOR 90 MG: 90 TABLET ORAL at 21:15

## 2024-06-30 RX ADMIN — PANTOPRAZOLE SODIUM 8 MG/HR: 40 INJECTION, POWDER, FOR SOLUTION INTRAVENOUS at 16:46

## 2024-06-30 RX ADMIN — ASPIRIN 81 MG 81 MG: 81 TABLET ORAL at 10:20

## 2024-06-30 RX ADMIN — TICAGRELOR 90 MG: 90 TABLET ORAL at 10:20

## 2024-06-30 RX ADMIN — PANTOPRAZOLE SODIUM 8 MG/HR: 40 INJECTION, POWDER, FOR SOLUTION INTRAVENOUS at 06:26

## 2024-06-30 RX ADMIN — ATORVASTATIN CALCIUM 40 MG: 40 TABLET, FILM COATED ORAL at 21:15

## 2024-06-30 RX ADMIN — METOPROLOL SUCCINATE 50 MG: 50 TABLET, EXTENDED RELEASE ORAL at 10:23

## 2024-06-30 RX ADMIN — FLUTICASONE PROPIONATE 1 SPRAY: 50 SPRAY, METERED NASAL at 10:21

## 2024-06-30 RX ADMIN — GUAIFENESIN 600 MG: 600 TABLET, EXTENDED RELEASE ORAL at 21:15

## 2024-06-30 RX ADMIN — TIOTROPIUM BROMIDE AND OLODATEROL 2 PUFF: 3.124; 2.736 SPRAY, METERED RESPIRATORY (INHALATION) at 09:57

## 2024-06-30 RX ADMIN — GUAIFENESIN 600 MG: 600 TABLET, EXTENDED RELEASE ORAL at 10:20

## 2024-06-30 RX ADMIN — AMIODARONE HYDROCHLORIDE 200 MG: 200 TABLET ORAL at 10:20

## 2024-06-30 NOTE — CONSULTS
[place] [id number]                              CONSULTATION      PATIENT NAME: LILY JACOBS            : 1945  MED REC NO: [id number]                       ROOM: [facility]  ACCOUNT NO: [id number]                       ADMIT DATE: 2024  PROVIDER: Eyal Hinton MD      CONSULT DATE:  2024    PRIMARY CARE PHYSICIAN:  Claudine Gutierrez CNP    REASON FOR CONSULT:  For further evaluation of any GI bleeding.    HISTORY OF PRESENT ILLNESS:  The patient is a 79-year-old pleasant female, has history of coronary artery disease, STEMI status post PCI with drug-eluting stent to RCA x3, for which the patient is on Brilinta and aspirin; ischemic cardiomyopathy with ejection fraction of 45%; squamous cell carcinoma of the lung, diagnosed on 2024; chronic kidney disease stage 3; hypertension; COPD; who started having black stools.  The patient had blood workup which showed hemoglobin 10.6, hematocrit 33.5 on 2024.  Yesterday, hemoglobin was 8.4, hematocrit 28.3, and today her hemoglobin is stable at 8.4, hematocrit 27.6.  I spoke with Dr. Patricia because of the drop in hemoglobin.  Discussed with her at length that the patient may need endoscopic intervention and I discussed about stopping Brilinta and aspirin.  She spoke with Cardiology Services because of drug-eluting stents.  Cardiology do not want to hold on any Brilinta and aspirin, and they are going to notify me if any significant bleeding, then they will stop Brilinta and aspirin, then they put her on cangrelor and heparin and stop the Brilinta and aspirin for therapeutic intervention.  This morning, the patient had a brown stool.  Denied any abdominal pain.  Denied any nausea, vomiting.  Tolerating oral diet.  She is undergoing further evaluation.    PAST MEDICAL HISTORY:  Hypertension, squamous cell lung cancer.    PAST SURGICAL HISTORY:  Cholecystectomy,

## 2024-06-30 NOTE — PROGRESS NOTES
Cardiology Progress Note      Patient:  Erin Hanna  YOB: 1945  MRN: [id number]   Acct: [id number]  Admit Date:  6/17/2024  Primary Cardiologist: none  Seen by Dr. Fuller     Per prior cardiology consult note-  CHIEF COMPLAINT: FERRARI        HPI: This is a pleasant 78 y.o. female with hx of HTN who presents with progressive dyspnea and sob.  Has been having severe sob for the last couple days.  Tonight, she awoke with sudden onset of sob.  No chest pain, arm pain, jaw pain, or palpitations.  Daughter brought her to the ED.  ECG show inferolateral ST-elevations but there is also concern for anterior elevations.  She has no f/c/ns.  No sick contacts.  No prior autoimmune diseases.  ED did POCUS, no obvious fluid, ED preserved.  Cr 1.2.  Hgb 13.  She is not having significant discomfort now, however ST elevation persists.        Subjective (Events in last 24 hours):   Pt up in chair   No chest pain - does still co SOB - per pt never had this before   On 1L O2  Bp stable     Overnight had episode of tachycardia (SVT)  after Atrovent treatment last rahel around 2130    Tele SR occ PAC noted today   Hasn't received BB today         6/21/2024  Pt up in chair   No chest pain orSOB co   Remains on 02  CT chest seen     BP stable - low --> asymptomatic         6/29/24  Pt has developed hgb drop   Discussed with Dr. Patricia - hgb slowly dropping over last few days - on protonix iv - hgb still dropping - pt with melena     GI wants to do colonoscopy and wants ASA / brilinta stopped if can - discussed with Dr Quinteros who wants to continue asa / brilinta and recheck HGB later -- if pt becomes unstable from anemia then stop antiplatelet agents -- pt high risk of ISR       Pt up in chair - no chest pain or SOB   She denies abd pains   VSS'  Tele sr - noted rare NSVT      6/30/24  Pt sitting at bedside   Hgb stable around  8.5    Pt without chest pain or SOB - remains in asa / brilinta  VSS  Tele SR with occ NSVT 5-7

## 2024-06-30 NOTE — PLAN OF CARE
of decreased cardiac output  Note: Ongoing assessment & interventions provided throughout shift.  Patient on continuous telemetry monitoring, heart tones, vitals & pulses checked at least 3 times per shift & PRN. Vitals within acceptable limits.  Peripheral pulses palpable.      Problem: Cardiovascular - Adult  Goal: Absence of cardiac dysrhythmias or at baseline  6/30/2024 0450 by Emili López RN  Outcome: Progressing  Flowsheets (Taken 6/30/2024 0450)  Absence of cardiac dysrhythmias or at baseline:   Monitor cardiac rate and rhythm   Assess for signs of decreased cardiac output  Note: .Ongoing assessment & interventions provided throughout shift.  Patient on continuous telemetry monitoring, heart tones, vitals & pulses checked at least 3 times per shift & PRN. Vitals within acceptable limits.  Peripheral pulses palpable.      Problem: Skin/Tissue Integrity - Adult  Goal: Skin integrity remains intact  6/30/2024 0450 by Emili López RN  Outcome: Progressing  Flowsheets (Taken 6/30/2024 0450)  Skin Integrity Remains Intact: Monitor for areas of redness and/or skin breakdown  Note: Ongoing assessment & interventions provided throughout shift.  Skin assessments provided.  Encouraging/assisting patient to turn as needed.      Problem: Pain  Goal: Verbalizes/displays adequate comfort level or baseline comfort level  6/30/2024 0450 by Emili López RN  Outcome: Progressing  Flowsheets (Taken 6/30/2024 0450)  Verbalizes/displays adequate comfort level or baseline comfort level:   Encourage patient to monitor pain and request assistance   Administer analgesics based on type and severity of pain and evaluate response   Consider cultural and social influences on pain and pain management   Implement non-pharmacological measures as appropriate and evaluate response   Assess pain using appropriate pain scale  Note: Ongoing assessment & interventions provided throughout shift.  Reminded patient to report any pain,  pressure, or shortness of breath to the nurse.  Pain medications provided per physician's orders.      Problem: Skin/Tissue Integrity  Goal: Absence of new skin breakdown  Description: 1.  Monitor for areas of redness and/or skin breakdown  2.  Assess vascular access sites hourly  3.  Every 4-6 hours minimum:  Change oxygen saturation probe site  4.  Every 4-6 hours:  If on nasal continuous positive airway pressure, respiratory therapy assess nares and determine need for appliance change or resting period.  6/30/2024 0450 by Emili López RN  Outcome: Progressing  Note: Ongoing assessment & interventions provided throughout shift.  Skin assessments provided.  Encouraging/assisting patient to turn as needed.      Problem: Chronic Conditions and Co-morbidities  Goal: Patient's chronic conditions and co-morbidity symptoms are monitored and maintained or improved  6/30/2024 0450 by Emili López RN  Outcome: Progressing  Flowsheets (Taken 6/30/2024 0450)  Care Plan - Patient's Chronic Conditions and Co-Morbidity Symptoms are Monitored and Maintained or Improved:   Monitor and assess patient's chronic conditions and comorbid symptoms for stability, deterioration, or improvement   Collaborate with multidisciplinary team to address chronic and comorbid conditions and prevent exacerbation or deterioration   Update acute care plan with appropriate goals if chronic or comorbid symptoms are exacerbated and prevent overall improvement and discharge     Problem: Nutrition Deficit:  Goal: Optimize nutritional status  6/30/2024 0450 by Emili López RN  Outcome: Progressing  Flowsheets (Taken 6/30/2024 0450)  Nutrient intake appropriate for improving, restoring, or maintaining nutritional needs:   Assess nutritional status and recommend course of action   Monitor oral intake, labs, and treatment plans   Care plan reviewed with patient.  Patient verbalizes understanding of the care plan and contributed to goal setting.

## 2024-06-30 NOTE — PROGRESS NOTES
Hospitalist Progress Note    Patient:  Erin Hanna      Unit/Bed:3B-25/025-A    YOB: 1945    MRN: [id number]       Acct: [id number]     PCP: Claudine Gutierrez APRN - CNP    Date of Admission: 6/17/2024    Assessment/Plan:    STEMI: Patient status post PCI with SANTA to RCA x 3.  Continue with Brilinta and aspirin.  Appreciate cardiology input.  Continue with metoprolol 50 mg daily, atorvastatin 40 mg daily  Ischemic cardiomyopathy: Ejection fraction 40 to 45%.  Does not appear to be volume overloaded during acute CHF exacerbation.  GDMT.  Right lower lobe mass with collapse: Patient status post EBUS 6/24/2024.  Biopsy is positive for non-small cell carcinoma.  Discussed with pulmonology will consult with radiation oncology.  Continue with supplemental O2.  Aggressive pulmonary toiletry.  Radiation oncology note patient will evaluate for possible urgent radiation if patient agreeable  MSSA pneumonia: post obstructive pneumonia. Continue with antibiotics, currently on IV Zosyn x 2 more days, so far cultures only growing out MSSA but given right lung collapse escalate antibiotics to cover for anaerobes  CKD stage III: Avoid nephrotoxic medications and monitor renal function.  Hypertension: Continue with metoprolol 50 mg daily, monitor blood pressure   Melena and acute blood loss anemia: Hemoglobin down but now stable on protonix gtt.  Patient is high risk given the recent PCI stenting and needs aspirin and Brilinta.  Discussed with cardiology today continue to trend and transfuse for hemoglobin less than 8.  GI consulted, will monitor if patient continues to drop her hemoglobin or shows signs of melena then will need EGD and bridging with cangrelor and heparin drip.  Type and screen, her H&H every 8.  Change from IV twice daily Protonix to drip.    COPD: As needed bronchodilators.  History of smoking  VTE prophylaxis: SCDs, restart heparin subcu if no further procedures or biopsies  patient's heart rhythm and procedure performed with the patient in a sitting position.     XR CHEST PORTABLE    Result Date: 6/18/2024  PROCEDURE: XR CHEST PORTABLE CLINICAL INFORMATION: SOB COMPARISON: 6/17/2024 TECHNIQUE: A single mobile view of the chest was obtained.     1. Borderline heart size. Soft tissue fullness projected over the right side of the cardiac silhouette, probably due to a moderate size hiatus hernia. 2. No acute infiltrates or effusions are seen. Prior cholecystectomy. **This report has been created using voice recognition software.  It may contain minor errors which are inherent in voice recognition technology.** Electronically signed by Dr. Johnny Gutierres    XR CHEST PORTABLE    Result Date: 6/17/2024  PROCEDURE: XR CHEST PORTABLE CLINICAL INFORMATION: SOB, LE edema. COMPARISON: 12/12/2008 TECHNIQUE: A single mobile view of the chest was obtained.     1. Borderline heart size. No effusion. Apparent prior cholecystectomy. 2. Abnormal increased density right cardiophrenic sulcus. Possibilities include pneumonia versus mass lesion. 3. CT thorax recommended for further evaluation. **This report has been created using voice recognition software.  It may contain minor errors which are inherent in voice recognition technology.** Electronically signed by Dr. Johnny Gutierres    US RENAL COMPLETE    Result Date: 6/17/2024  PROCEDURE: US RENAL COMPLETE CLINICAL INFORMATION: Acute kidney injury TECHNIQUE: Ultrasound of the kidneys and urinary bladder was performed. Grayscale and color images were obtained. COMPARISON: Renal ultrasound 9/22/2021 FINDINGS: The right kidney measures 10.0 x 4.2 x 5.0 cm and the left kidney measures 5.5 x 4.0 x 3.3 cm. There is cortical thinning on the left side. There is no hydronephrosis, calculi or intrarenal mass. Color Doppler demonstrates expected waveforms in the bilateral renal arteries. Arcuate resistive indices are mildly elevated at 0.8 bilaterally. There is a

## 2024-06-30 NOTE — PLAN OF CARE
Problem: Discharge Planning  Goal: Discharge to home or other facility with appropriate resources  6/30/2024 1820 by Florecita Valenzuela, RN  Outcome: Progressing  Flowsheets (Taken 6/30/2024 0830)  Discharge to home or other facility with appropriate resources: Identify barriers to discharge with patient and caregiver     Problem: ABCDS Injury Assessment  Goal: Absence of physical injury  6/30/2024 1820 by Florecita Valeznuela, RN  Outcome: Progressing     Problem: Safety - Adult  Goal: Free from fall injury  6/30/2024 1820 by Florecita Valenzuela, RN  Outcome: Progressing     Problem: Cardiovascular - Adult  Goal: Maintains optimal cardiac output and hemodynamic stability  6/30/2024 1820 by Florecita Valenzuela RN  Outcome: Progressing  Flowsheets (Taken 6/30/2024 0830)  Maintains optimal cardiac output and hemodynamic stability: Monitor blood pressure and heart rate     Problem: Cardiovascular - Adult  Goal: Absence of cardiac dysrhythmias or at baseline  6/30/2024 1820 by Florecita Valenzuela RN  Outcome: Progressing  Flowsheets (Taken 6/30/2024 0830)  Absence of cardiac dysrhythmias or at baseline: Monitor cardiac rate and rhythm     Problem: Skin/Tissue Integrity - Adult  Goal: Skin integrity remains intact  6/30/2024 1820 by Florecita Valenzuela RN  Outcome: Progressing  Flowsheets  Taken 6/30/2024 1727  Skin Integrity Remains Intact: Monitor for areas of redness and/or skin breakdown  Taken 6/30/2024 0830  Skin Integrity Remains Intact: Monitor for areas of redness and/or skin breakdown     Problem: Pain  Goal: Verbalizes/displays adequate comfort level or baseline comfort level  6/30/2024 1820 by Florecita Valenzuela, RN  Outcome: Progressing  Flowsheets (Taken 6/30/2024 0828)  Verbalizes/displays adequate comfort level or baseline comfort level: Encourage patient to monitor pain and request assistance     Problem: Skin/Tissue Integrity  Goal: Absence of new skin breakdown  Description: 1.  Monitor for areas of redness

## 2024-07-01 LAB
ANION GAP SERPL CALC-SCNC: 8 MEQ/L (ref 8–16)
BASOPHILS ABSOLUTE: 0 THOU/MM3 (ref 0–0.1)
BASOPHILS NFR BLD AUTO: 0.1 %
BUN SERPL-MCNC: 17 MG/DL (ref 7–22)
CALCIUM SERPL-MCNC: 8.1 MG/DL (ref 8.5–10.5)
CHLORIDE SERPL-SCNC: 104 MEQ/L (ref 98–111)
CO2 SERPL-SCNC: 27 MEQ/L (ref 23–33)
CREAT SERPL-MCNC: 0.8 MG/DL (ref 0.4–1.2)
DEPRECATED RDW RBC AUTO: 50.4 FL (ref 35–45)
EOSINOPHIL NFR BLD AUTO: 0.1 %
EOSINOPHILS ABSOLUTE: 0 THOU/MM3 (ref 0–0.4)
ERYTHROCYTE [DISTWIDTH] IN BLOOD BY AUTOMATED COUNT: 14.6 % (ref 11.5–14.5)
GFR SERPL CREATININE-BSD FRML MDRD: 75 ML/MIN/1.73M2
GLUCOSE SERPL-MCNC: 111 MG/DL (ref 70–108)
HCT VFR BLD AUTO: 27.3 % (ref 37–47)
HCT VFR BLD AUTO: 27.7 % (ref 37–47)
HCT VFR BLD AUTO: 28 % (ref 37–47)
HGB BLD-MCNC: 8.6 GM/DL (ref 12–16)
IMM GRANULOCYTES # BLD AUTO: 0.05 THOU/MM3 (ref 0–0.07)
IMM GRANULOCYTES NFR BLD AUTO: 0.5 %
LYMPHOCYTES ABSOLUTE: 0.5 THOU/MM3 (ref 1–4.8)
LYMPHOCYTES NFR BLD AUTO: 5.3 %
MCH RBC QN AUTO: 29.9 PG (ref 26–33)
MCHC RBC AUTO-ENTMCNC: 31 GM/DL (ref 32.2–35.5)
MCV RBC AUTO: 96.2 FL (ref 81–99)
MONOCYTES ABSOLUTE: 0.6 THOU/MM3 (ref 0.4–1.3)
MONOCYTES NFR BLD AUTO: 6.5 %
NEUTROPHILS ABSOLUTE: 8.4 THOU/MM3 (ref 1.8–7.7)
NEUTROPHILS NFR BLD AUTO: 87.5 %
NRBC BLD AUTO-RTO: 0 /100 WBC
PLATELET # BLD AUTO: 133 THOU/MM3 (ref 130–400)
PMV BLD AUTO: 12 FL (ref 9.4–12.4)
POTASSIUM SERPL-SCNC: 3.8 MEQ/L (ref 3.5–5.2)
RBC # BLD AUTO: 2.88 MILL/MM3 (ref 4.2–5.4)
SODIUM SERPL-SCNC: 139 MEQ/L (ref 135–145)
WBC # BLD AUTO: 9.6 THOU/MM3 (ref 4.8–10.8)

## 2024-07-01 PROCEDURE — 6370000000 HC RX 637 (ALT 250 FOR IP): Performed by: INTERNAL MEDICINE

## 2024-07-01 PROCEDURE — 2580000003 HC RX 258: Performed by: HOSPITALIST

## 2024-07-01 PROCEDURE — 2580000003 HC RX 258: Performed by: INTERNAL MEDICINE

## 2024-07-01 PROCEDURE — 94761 N-INVAS EAR/PLS OXIMETRY MLT: CPT

## 2024-07-01 PROCEDURE — 36415 COLL VENOUS BLD VENIPUNCTURE: CPT

## 2024-07-01 PROCEDURE — C9113 INJ PANTOPRAZOLE SODIUM, VIA: HCPCS | Performed by: HOSPITALIST

## 2024-07-01 PROCEDURE — 80048 BASIC METABOLIC PNL TOTAL CA: CPT

## 2024-07-01 PROCEDURE — 6360000002 HC RX W HCPCS: Performed by: HOSPITALIST

## 2024-07-01 PROCEDURE — 94640 AIRWAY INHALATION TREATMENT: CPT

## 2024-07-01 PROCEDURE — 85018 HEMOGLOBIN: CPT

## 2024-07-01 PROCEDURE — 2140000000 HC CCU INTERMEDIATE R&B

## 2024-07-01 PROCEDURE — 99232 SBSQ HOSP IP/OBS MODERATE 35: CPT | Performed by: NURSE PRACTITIONER

## 2024-07-01 PROCEDURE — 97530 THERAPEUTIC ACTIVITIES: CPT

## 2024-07-01 PROCEDURE — 85025 COMPLETE CBC W/AUTO DIFF WBC: CPT

## 2024-07-01 PROCEDURE — 6370000000 HC RX 637 (ALT 250 FOR IP): Performed by: NURSE PRACTITIONER

## 2024-07-01 PROCEDURE — 6370000000 HC RX 637 (ALT 250 FOR IP): Performed by: HOSPITALIST

## 2024-07-01 PROCEDURE — 99233 SBSQ HOSP IP/OBS HIGH 50: CPT | Performed by: HOSPITALIST

## 2024-07-01 PROCEDURE — 6370000000 HC RX 637 (ALT 250 FOR IP)

## 2024-07-01 PROCEDURE — 85014 HEMATOCRIT: CPT

## 2024-07-01 PROCEDURE — 2700000000 HC OXYGEN THERAPY PER DAY

## 2024-07-01 RX ORDER — PANTOPRAZOLE SODIUM 40 MG/10ML
40 INJECTION, POWDER, LYOPHILIZED, FOR SOLUTION INTRAVENOUS 2 TIMES DAILY
Status: DISCONTINUED | OUTPATIENT
Start: 2024-07-01 | End: 2024-07-05 | Stop reason: ALTCHOICE

## 2024-07-01 RX ADMIN — PANTOPRAZOLE SODIUM 40 MG: 40 INJECTION, POWDER, FOR SOLUTION INTRAVENOUS at 15:06

## 2024-07-01 RX ADMIN — ASPIRIN 81 MG 81 MG: 81 TABLET ORAL at 08:13

## 2024-07-01 RX ADMIN — ATORVASTATIN CALCIUM 40 MG: 40 TABLET, FILM COATED ORAL at 22:17

## 2024-07-01 RX ADMIN — GUAIFENESIN 600 MG: 600 TABLET, EXTENDED RELEASE ORAL at 22:17

## 2024-07-01 RX ADMIN — TIOTROPIUM BROMIDE AND OLODATEROL 2 PUFF: 3.124; 2.736 SPRAY, METERED RESPIRATORY (INHALATION) at 09:14

## 2024-07-01 RX ADMIN — SODIUM CHLORIDE, PRESERVATIVE FREE 10 ML: 5 INJECTION INTRAVENOUS at 08:16

## 2024-07-01 RX ADMIN — PANTOPRAZOLE SODIUM 40 MG: 40 INJECTION, POWDER, FOR SOLUTION INTRAVENOUS at 22:17

## 2024-07-01 RX ADMIN — GUAIFENESIN 600 MG: 600 TABLET, EXTENDED RELEASE ORAL at 08:13

## 2024-07-01 RX ADMIN — METOPROLOL SUCCINATE 50 MG: 50 TABLET, EXTENDED RELEASE ORAL at 08:12

## 2024-07-01 RX ADMIN — TICAGRELOR 90 MG: 90 TABLET ORAL at 22:17

## 2024-07-01 RX ADMIN — PANTOPRAZOLE SODIUM 8 MG/HR: 40 INJECTION, POWDER, FOR SOLUTION INTRAVENOUS at 14:01

## 2024-07-01 RX ADMIN — TICAGRELOR 90 MG: 90 TABLET ORAL at 08:12

## 2024-07-01 RX ADMIN — PANTOPRAZOLE SODIUM 8 MG/HR: 40 INJECTION, POWDER, FOR SOLUTION INTRAVENOUS at 02:47

## 2024-07-01 RX ADMIN — SODIUM CHLORIDE, PRESERVATIVE FREE 10 ML: 5 INJECTION INTRAVENOUS at 22:17

## 2024-07-01 RX ADMIN — AMIODARONE HYDROCHLORIDE 200 MG: 200 TABLET ORAL at 08:13

## 2024-07-01 NOTE — CARE COORDINATION
7/1/24, 12:04 PM EDT    DISCHARGE PLANNING EVALUATION    Met with patient this morning regarding discharge plan.  She told SW that she is not sure that she is going home anymore.  She said she is getting confused about what her family wants at discharge.  Asked if SW could call her daughter.  She told SW that her daughter was at work, but she then told SW to call her.  Spoke with daughter Kari.  She told SW that she and her daughter can't provide 24 hour care and she is afraid that the family that say they can help, will not be able to help.  She wants Erin to go to a facility for rehab.  She is aware the [facility] and [facility] are out of network and [facility] not [facility] beds.  Her next choice is [facility] then [facility].  Spoke with Erin and she is agreeable to go to an ECF and is OK with [facility] or [facility].  Called Tere at the facility and made a referral.  Patient will be a precert.

## 2024-07-01 NOTE — PROGRESS NOTES
[facility]  INPATIENT PHYSICAL THERAPY  DAILY NOTE  STRZ CCU-STEPDOWN 3B - 3B-25/025-A    Time In: 1430  Time Out: 1439  Timed Code Treatment Minutes: 9 Minutes  Minutes: 9          Date: 2024  Patient Name: Erin Hanna,  Gender:  female        MRN: [id number]  : 1945  (79 y.o.)     Referring Practitioner: Gifty Castle APRN - CNP  Diagnosis: STEMI  Additional Pertinent Hx: Per EMR: 89-year-old female with history of hypertension and CKD 3A presenting with worsening shortness of breath.  This been going on for the past few days.  Woke up very early this morning with severe shortness of breath.  Denied any chest pain/arm pain/jaw pain at that time.  Denied palpitations.  Denied nausea or vomiting.  Was brought to the ED, EKG showed inferolateral ST elevations, elevated troponin at 136.  No prior history of stents.  Interventional cardiology consulted, patient taken for emergent cath, PCI w/ SANTA x 3 to RCA. s/p BRONCHOSCOPY ENDOBRONCHIAL ULTRASOUND      Prior Level of Function:  Lives With: Alone  Type of Home: House (Duplex)  Home Layout: Two level (Daughter lives on 1st floor, patient prefers the 2nd story)  Home Access: Stairs to enter without rails  Entrance Stairs - Number of Steps: 3 JORDYN without handrails, but planning to install them. Full flight of steps to 2nd story with 1 handrail.  Home Equipment: None   Bathroom Shower/Tub: Tub/Shower unit  Bathroom Toilet: Standard  Bathroom Equipment: None    ADL Assistance: Independent  Homemaking Assistance: Independent  Homemaking Responsibilities: Yes (Takes care of her own housekeeping needs.)  Ambulation Assistance: Independent  Transfer Assistance: Independent  Active : Yes  Additional Comments: Patient reporting she was IND PTA without use of an AD, was not on oxygen PTA. Patient states her daughter works during the day but has granddtr here from [place] and thinking about staying for the summer to assist if  get in/out of bed.  Short Term Goal 2: Sit to/from stand at mod I to get up to walk  Short Term Goal 3: Ambulate 150 feet with RW at Mod I to walk in home safely  Short Term Goal 4: Ascend/Descend full flight of steps with 1 handrail at CGA to enter/exit 2nd floor of home.  Short Term Goal 5: Ascend/Descend 3 steps with HHA at CGA to enter/exit home.  Long Term Goals  Time Frame for Long Term Goals : NA due to short ELOS    Following session, patient left in safe position with all fall risk precautions in place.

## 2024-07-01 NOTE — PROGRESS NOTES
Hospitalist Progress Note    Patient:  Erin Hanna      Unit/Bed:3B-25/025-A    YOB: 1945    MRN: [id number]       Acct: [id number]     PCP: Claudine Gutierrez APRN - CNP    Date of Admission: 6/17/2024    Assessment/Plan:    STEMI: Patient status post PCI with SANTA to RCA x 3.  Continue with Brilinta and aspirin.  Appreciate cardiology input.  Continue with metoprolol 50 mg daily, atorvastatin 40 mg daily  Ischemic cardiomyopathy: Ejection fraction 40 to 45%.  Does not appear to be volume overloaded during acute CHF exacerbation.  GDMT.  Right lower lobe mass with collapse: Patient status post EBUS 6/24/2024.  Biopsy is positive for non-small cell carcinoma.  Discussed with pulmonology will consult with radiation oncology.  Continue with supplemental O2.  Aggressive pulmonary toiletry.  Radiation oncology note patient will evaluate for possible urgent radiation if patient agreeable  MSSA pneumonia: post obstructive pneumonia. Continue with antibiotics, currently on IV Zosyn x 2 more days, so far cultures only growing out MSSA but given right lung collapse escalate antibiotics to cover for anaerobes  CKD stage III: Avoid nephrotoxic medications and monitor renal function.  Hypertension: Continue with metoprolol 50 mg daily, monitor blood pressure   Melena and acute blood loss anemia: Hemoglobin down but now stable on protonix gtt.  Patient is high risk given the recent PCI stenting and needs aspirin and Brilinta.  Discussed with cardiology today continue to trend and transfuse for hemoglobin less than 8.  GI consulted, will monitor if patient continues to drop her hemoglobin or shows signs of melena then will need EGD and bridging with cangrelor and heparin drip.  Type and screen, her H&H every 8.  Hemoglobin has been stable will plan to transition to p.o. Protonix twice daily.  COPD: As needed bronchodilators.  History of smoking  VTE prophylaxis: SCDs, restart heparin subcu if no further

## 2024-07-01 NOTE — PALLIATIVE CARE
Follow Up / Progress Note        Patient:   Erin Hanna  YOB: 1945  Age:  79 y.o.  Room:  [address] [place] [address]  MRN:  [id number]           Advance Care Planning     Palliative Team Advance Care Planning (ACP) Conversation    Date of Conversation: 07/01/24    Individuals present for the conversation: Patient with decision making capacity      Conversation Summary:  Returned to room to follow up with patient. Patient states she is looking forward to being discharged. Patient shares that the plan is for her to go to a nursing home for rehab. Discussed current admission. Asked patient how she and her children were coping. Patient states she is doing okay, continues to worry for her two children. Asked patient if she has made any further decisions on her plan once discharged. Patient shares that her daughter has been the one in contact with the care team to make decisions. Informed patient of follow up with oncology. Discussed it may be beneficial to have her children attend appointment to discuss options. Informed patient that this Rn was aware that last week the patient had shared that she would not want to pursue treatment. Asked to discuss code status. Educated on FULL code including chest compressions with possible rib fx and organ damage, intubation, defibrillation, and resuscitative medications. Patient states she thinks her daughter discussed this with someone. Informed patient of this being her own decision, but understand that she would want her family's input. Encouraged patient to discuss this with her family when she is able. Discussed patient's ongoing symptoms. Patient shares that she has chronic back pain, for which she has lidocaine patches which help. Patient states she feels her appetite has improved, unfortunately patient feels that she is interrupted at every meal and is unable to eat when food is hot. Patient again shared that she is ready to go to SNF. Patient  denied further questions at this time.     Plan: Patient continues to be undecided regarding outpatient treatment. Patient wants her children involved in discussions with oncology. Plan for SNF.      I spent 20 minutes with the patient and/or surrogate decision maker discussing the patient's wishes and goals.      Jennifer Bower RN              Electronically signed by Jennifer Bower RN on 7/1/2024 at 2:49 PM             Palliative Care Office: [phone]

## 2024-07-01 NOTE — CARE COORDINATION
7/1/24, 2:52 PM EDT    DISCHARGE ON GOING EVALUATION    Erin Hanna       Hospital day: 14  Location: [address] [place] [address]-A Reason for admit: STEMI (ST elevation myocardial infarction) (HCC) [I21.3]  ST elevation myocardial infarction (STEMI), unspecified artery (HCC) [I21.3]     Procedures:   6/17 [facility] with Dr. Fuller: Successful PCI of RCA STEMI using OCT guidance followed by 3 overlapping SANTA.   6/18 Echo: EF 40-45%. Mild global hypokinesis of left ventricle. Aortic valve: Moderately thickened and calcified cusp. Mitral valve: Moderately thickened and calcified leaflet, at the posterior leaflet. Left atrium: severely dilated. Right atrium: moderately dilated.   6/24 EBUS with Dr. Lugo: Endobronchial ultrasound with biopsy of mass and lymph nodes (3 nodes) and 1 mass.   Station 4L 1.5 cm - tbna x 3 - jarad negative  Station 7 2 cm - tbna x 3 - jarad lymphocytes  Station 4R - small < 1cm - no biopsy  Station 10 R - large 2.5 cm - JARAD: positive non-small  Right lower lobe mass large > 4cm - JARAD: positive non-small.    Imaging since last note:   6/27 CT chest/abd/pelvis:   1. A right infrahilar right lower lobe mass measures at least 5.0 x 7.0 cm. The  surrounding consolidation in the right lower lobe and the small right pleural  effusion partially obscures visualization. Mediastinal, hilar, and subcarinal  lymphadenopathy is described.   2. No metastatic disease is visualized within the abdomen/pelvis. Cirrhotic  liver morphology is suspected.  6/27 MRI Brain: Chronic involutional volume loss. No acute findings. Specifically no signs of metastasis.  6/30 CXR:   1. Normal heart size. Lungs are somewhat hyperinflated. Cannot exclude COPD.  2. Small pleural effusion right side. Moderate right basilar  atelectasis/pneumonia. Minimal infiltrate in the left lateral costophrenic sulcus.  3. Overall appearance of chest has worsened since prior.  4. Prior cholecystectomy.    Barriers to Discharge: Hospitalist, Oncology, GI

## 2024-07-01 NOTE — PROGRESS NOTES
[facility]'s    Patient - Erin Hanna   MRN -  [id number]   [facility] # - [id number]   - 1945      Date of Admission -  2024  4:37 AM  Date of evaluation -  2024  Room - 3B-25025-A   Hospital Day - 14  Consulting - Antonio Patricia MD Primary Care Physician - Claudine Gutierrez APRN - CNP       Reason for Consult    Newly diagnosed squamous cell carcinoma  Active Hospital Problem List      Active Hospital Problems    Diagnosis Date Noted    Lung collapse [J98.19] 2024    Tobacco smoke exposure [Z77.22] 2024    Mediastinal lymphadenopathy [R59.0] 2024    Lung mass [R91.8] 2024    Severe malnutrition (HCC) [E43] 2024     Class: Acute    STEMI (ST elevation myocardial infarction) (HCC) [I21.3] 2024     HPI/Subjective   Erin Hanna is a 79 y.o. female admitted for worsening shortness of breath that had been progressively worsening over the few days prior to admission. She woke up on 24 with severe shortness of breath and chest pain. She was brought to ER and EKG revealed inferolateral ST elevations and elevated troponin. She underwent coronary angiography on 24. XR at the time of admission revealed Right cardiophrenic sulcus mass versus pneumonia confirmed by CT scan. She underwent biopsy via EBUS on 24 with Dr. Lugo. Pathology revealed:  FINAL RESULTS:   A: 4L lymph node, endobronchial ultrasound, fine needle aspiration   (EBUS-FNA):    No malignant cells seen.    Specimen consists of small, benign appearing lymphocytes and   anthracotic macrophages.     B: Station 7 lymph node, EBUS-FNA:    No malignant cells seen.    Specimen consists of small, benign appearing lymphocytes and   anthracotic macrophages.     C: 10R lymph node, EBUS-FNA:    POSITIVE FOR MALIGNANCY.    Findings are consistent with squamous cell carcinoma.     D: Right lower lobe of lung, mass, EBUS-FNA:    POSITIVE FOR MALIGNANCY.    Findings are  and procedure performed with the patient in a sitting position.     XR CHEST PORTABLE    Result Date: 6/18/2024  PROCEDURE: XR CHEST PORTABLE CLINICAL INFORMATION: SOB COMPARISON: 6/17/2024 TECHNIQUE: A single mobile view of the chest was obtained.     1. Borderline heart size. Soft tissue fullness projected over the right side of the cardiac silhouette, probably due to a moderate size hiatus hernia. 2. No acute infiltrates or effusions are seen. Prior cholecystectomy. **This report has been created using voice recognition software.  It may contain minor errors which are inherent in voice recognition technology.** Electronically signed by Dr. Johnny Gutierres    XR CHEST PORTABLE    Result Date: 6/17/2024  PROCEDURE: XR CHEST PORTABLE CLINICAL INFORMATION: SOB, LE edema. COMPARISON: 12/12/2008 TECHNIQUE: A single mobile view of the chest was obtained.     1. Borderline heart size. No effusion. Apparent prior cholecystectomy. 2. Abnormal increased density right cardiophrenic sulcus. Possibilities include pneumonia versus mass lesion. 3. CT thorax recommended for further evaluation. **This report has been created using voice recognition software.  It may contain minor errors which are inherent in voice recognition technology.** Electronically signed by Dr. Johnny Gutierres    US RENAL COMPLETE    Result Date: 6/17/2024  PROCEDURE: US RENAL COMPLETE CLINICAL INFORMATION: Acute kidney injury TECHNIQUE: Ultrasound of the kidneys and urinary bladder was performed. Grayscale and color images were obtained. COMPARISON: Renal ultrasound 9/22/2021 FINDINGS: The right kidney measures 10.0 x 4.2 x 5.0 cm and the left kidney measures 5.5 x 4.0 x 3.3 cm. There is cortical thinning on the left side. There is no hydronephrosis, calculi or intrarenal mass. Color Doppler demonstrates expected waveforms in the bilateral renal arteries. Arcuate resistive indices are mildly elevated at 0.8 bilaterally. There is a catheter in the urinary

## 2024-07-02 PROBLEM — C34.90: Status: ACTIVE | Noted: 2024-07-02

## 2024-07-02 LAB
ANION GAP SERPL CALC-SCNC: 8 MEQ/L (ref 8–16)
BASOPHILS ABSOLUTE: 0 THOU/MM3 (ref 0–0.1)
BASOPHILS NFR BLD AUTO: 0.1 %
BUN SERPL-MCNC: 17 MG/DL (ref 7–22)
CALCIUM SERPL-MCNC: 8.3 MG/DL (ref 8.5–10.5)
CHLORIDE SERPL-SCNC: 106 MEQ/L (ref 98–111)
CO2 SERPL-SCNC: 26 MEQ/L (ref 23–33)
CREAT SERPL-MCNC: 0.7 MG/DL (ref 0.4–1.2)
DEPRECATED RDW RBC AUTO: 49.2 FL (ref 35–45)
EOSINOPHIL NFR BLD AUTO: 0.2 %
EOSINOPHILS ABSOLUTE: 0 THOU/MM3 (ref 0–0.4)
ERYTHROCYTE [DISTWIDTH] IN BLOOD BY AUTOMATED COUNT: 14.6 % (ref 11.5–14.5)
GFR SERPL CREATININE-BSD FRML MDRD: 88 ML/MIN/1.73M2
GLUCOSE SERPL-MCNC: 118 MG/DL (ref 70–108)
HCT VFR BLD AUTO: 26 % (ref 37–47)
HCT VFR BLD AUTO: 26.3 % (ref 37–47)
HCT VFR BLD AUTO: 29.1 % (ref 37–47)
HGB BLD-MCNC: 8.2 GM/DL (ref 12–16)
HGB BLD-MCNC: 8.2 GM/DL (ref 12–16)
HGB BLD-MCNC: 9 GM/DL (ref 12–16)
IMM GRANULOCYTES # BLD AUTO: 0.05 THOU/MM3 (ref 0–0.07)
IMM GRANULOCYTES NFR BLD AUTO: 0.5 %
LYMPHOCYTES ABSOLUTE: 0.5 THOU/MM3 (ref 1–4.8)
LYMPHOCYTES NFR BLD AUTO: 5.2 %
MCH RBC QN AUTO: 29.9 PG (ref 26–33)
MCHC RBC AUTO-ENTMCNC: 31.5 GM/DL (ref 32.2–35.5)
MCV RBC AUTO: 94.9 FL (ref 81–99)
MONOCYTES ABSOLUTE: 0.5 THOU/MM3 (ref 0.4–1.3)
MONOCYTES NFR BLD AUTO: 5.3 %
NEUTROPHILS ABSOLUTE: 8.8 THOU/MM3 (ref 1.8–7.7)
NEUTROPHILS NFR BLD AUTO: 88.7 %
NRBC BLD AUTO-RTO: 0 /100 WBC
PLATELET # BLD AUTO: 141 THOU/MM3 (ref 130–400)
PMV BLD AUTO: 11.7 FL (ref 9.4–12.4)
POTASSIUM SERPL-SCNC: 3.9 MEQ/L (ref 3.5–5.2)
RBC # BLD AUTO: 2.74 MILL/MM3 (ref 4.2–5.4)
SODIUM SERPL-SCNC: 140 MEQ/L (ref 135–145)
WBC # BLD AUTO: 9.9 THOU/MM3 (ref 4.8–10.8)

## 2024-07-02 PROCEDURE — 6360000002 HC RX W HCPCS: Performed by: HOSPITALIST

## 2024-07-02 PROCEDURE — 85025 COMPLETE CBC W/AUTO DIFF WBC: CPT

## 2024-07-02 PROCEDURE — 36415 COLL VENOUS BLD VENIPUNCTURE: CPT

## 2024-07-02 PROCEDURE — 99232 SBSQ HOSP IP/OBS MODERATE 35: CPT | Performed by: HOSPITALIST

## 2024-07-02 PROCEDURE — 85018 HEMOGLOBIN: CPT

## 2024-07-02 PROCEDURE — 6370000000 HC RX 637 (ALT 250 FOR IP): Performed by: HOSPITALIST

## 2024-07-02 PROCEDURE — 80048 BASIC METABOLIC PNL TOTAL CA: CPT

## 2024-07-02 PROCEDURE — 97530 THERAPEUTIC ACTIVITIES: CPT

## 2024-07-02 PROCEDURE — 85014 HEMATOCRIT: CPT

## 2024-07-02 PROCEDURE — 6370000000 HC RX 637 (ALT 250 FOR IP): Performed by: NURSE PRACTITIONER

## 2024-07-02 PROCEDURE — 97116 GAIT TRAINING THERAPY: CPT

## 2024-07-02 PROCEDURE — 6370000000 HC RX 637 (ALT 250 FOR IP)

## 2024-07-02 PROCEDURE — 97110 THERAPEUTIC EXERCISES: CPT

## 2024-07-02 PROCEDURE — 94640 AIRWAY INHALATION TREATMENT: CPT

## 2024-07-02 PROCEDURE — 6370000000 HC RX 637 (ALT 250 FOR IP): Performed by: INTERNAL MEDICINE

## 2024-07-02 PROCEDURE — 2140000000 HC CCU INTERMEDIATE R&B

## 2024-07-02 PROCEDURE — 2580000003 HC RX 258: Performed by: INTERNAL MEDICINE

## 2024-07-02 RX ADMIN — ATORVASTATIN CALCIUM 40 MG: 40 TABLET, FILM COATED ORAL at 21:02

## 2024-07-02 RX ADMIN — AMIODARONE HYDROCHLORIDE 200 MG: 200 TABLET ORAL at 07:59

## 2024-07-02 RX ADMIN — GUAIFENESIN 600 MG: 600 TABLET, EXTENDED RELEASE ORAL at 07:59

## 2024-07-02 RX ADMIN — PANTOPRAZOLE SODIUM 40 MG: 40 INJECTION, POWDER, FOR SOLUTION INTRAVENOUS at 07:59

## 2024-07-02 RX ADMIN — TICAGRELOR 90 MG: 90 TABLET ORAL at 07:59

## 2024-07-02 RX ADMIN — SODIUM CHLORIDE, PRESERVATIVE FREE 10 ML: 5 INJECTION INTRAVENOUS at 21:02

## 2024-07-02 RX ADMIN — METOPROLOL SUCCINATE 50 MG: 50 TABLET, EXTENDED RELEASE ORAL at 07:58

## 2024-07-02 RX ADMIN — TICAGRELOR 90 MG: 90 TABLET ORAL at 21:02

## 2024-07-02 RX ADMIN — SODIUM CHLORIDE, PRESERVATIVE FREE 10 ML: 5 INJECTION INTRAVENOUS at 08:00

## 2024-07-02 RX ADMIN — PANTOPRAZOLE SODIUM 40 MG: 40 INJECTION, POWDER, FOR SOLUTION INTRAVENOUS at 21:02

## 2024-07-02 RX ADMIN — TIOTROPIUM BROMIDE AND OLODATEROL 2 PUFF: 3.124; 2.736 SPRAY, METERED RESPIRATORY (INHALATION) at 09:55

## 2024-07-02 RX ADMIN — ASPIRIN 81 MG 81 MG: 81 TABLET ORAL at 07:59

## 2024-07-02 RX ADMIN — GUAIFENESIN 600 MG: 600 TABLET, EXTENDED RELEASE ORAL at 21:02

## 2024-07-02 NOTE — CARE COORDINATION
7/2/24, 2:21 PM EDT    DISCHARGE ON GOING EVALUATION    Erin Hanna       [facility] day: 15  [facility] Reason for admit: STEMI (ST elevation myocardial infarction) (HCC) [I21.3]  ST elevation myocardial infarction (STEMI), unspecified artery (HCC) [I21.3]     Procedures:   6/17 [facility] with Dr. Fuller: Successful PCI of RCA STEMI using OCT guidance followed by 3 overlapping SANTA.   6/18 Echo: EF 40-45%. Mild global hypokinesis of left ventricle. Aortic valve: Moderately thickened and calcified cusp. Mitral valve: Moderately thickened and calcified leaflet, at the posterior leaflet. Left atrium: severely dilated. Right atrium: moderately dilated.   6/24 EBUS with Dr. Lugo: Endobronchial ultrasound with biopsy of mass and lymph nodes (3 nodes) and 1 mass.   Station 4L 1.5 cm - tbna x 3 - slick negative  Station 7 2 cm - tbna x 3 - slick lymphocytes  Station 4R - small < 1cm - no biopsy  Station 10 R - large 2.5 cm - SLICK: positive non-small  Right lower lobe mass large > 4cm - SLICK: positive non-small.    Imaging since last note: None    Barriers to Discharge: Hospitalist, Oncology, GI and Pulmonology following. Cardiology prn. PT/OT. Protonix iv bid.     PCP: Claudine Gutierrez APRN - CNP  Readmission Risk Score: 12.9    Patient Goals/Plan/Treatment Preferences: From home alone. SW following, planning SNF, awaiting acceptance.

## 2024-07-02 NOTE — PALLIATIVE CARE
Follow Up / Progress Note        Patient:   Erin Hanna  YOB: 1945  Age:  79 y.o.  Room:  [address] [place] [address]-  MRN:  [id number]         Family/Patient Discussion:  In room to follow up with patient. Patient had just received breakfast tray. Patient has expressed not being able to eat when food is hot in the past to this RN. Did not disturb patient at this time.       Plan/Follow-Up:  Will continue to follow PRN. Please call with immediate needs. Patient seems to want to speak with family privately to make further medical care.         Electronically signed by Jennifer Bower RN on 7/2/2024 at 9:12 AM             Palliative Care Office: [phone]

## 2024-07-02 NOTE — PROGRESS NOTES
Hospitalist Progress Note    Patient:  Erin Hanna      Unit/Bed:3B-25/025-A    YOB: 1945    MRN: [id number]       Acct: [id number]     PCP: Claudine Gutierrez APRN - CNP    Date of Admission: 6/17/2024    Assessment/Plan:    STEMI: Patient status post PCI with SANTA to RCA x 3.  Continue with Brilinta and aspirin.  Appreciate cardiology input.  Continue with metoprolol 50 mg daily, atorvastatin 40 mg daily  Ischemic cardiomyopathy: Ejection fraction 40 to 45%.  Does not appear to be volume overloaded during acute CHF exacerbation.  GDMT.  Right lower lobe mass with collapse: Patient status post EBUS 6/24/2024.  Biopsy is positive for non-small cell carcinoma.  Discussed with pulmonology and radiation oncology.  Continue with supplemental O2.  Aggressive pulmonary toiletry.  Patient unsure if she wants to proceed with chemo and radiation.    MSSA pneumonia: post obstructive pneumonia. Continue with antibiotics, currently on IV Zosyn x 2 more days, so far cultures only growing out MSSA but given right lung collapse escalate antibiotics to cover for anaerobes  CKD stage III: Avoid nephrotoxic medications and monitor renal function.  Hypertension: Continue with metoprolol 50 mg daily, monitor blood pressure   Melena and acute blood loss anemia: Hemoglobin down but now stable on protonix gtt.  Patient is high risk given the recent PCI stenting and needs aspirin and Brilinta.  Discussed with cardiology, continue to trend and transfuse for hemoglobin less than 8.  GI consulted, will monitor if patient continues to drop her hemoglobin or shows signs of melena then will need EGD and bridging with cangrelor and heparin drip.  Type and screen, her H&H every 8.  Hemoglobin has been stable will plan to transition to p.o. Protonix twice daily.  COPD: As needed bronchodilators.  History of smoking  VTE prophylaxis: SCDs,   Advanced care planning:  Currently full code, unsure if she wants to proceed with chemo  x 4.0 x 3.3 cm. There is cortical thinning on the left side. There is no hydronephrosis, calculi or intrarenal mass. Color Doppler demonstrates expected waveforms in the bilateral renal arteries. Arcuate resistive indices are mildly elevated at 0.8 bilaterally. There is a catheter in the urinary bladder.     1. Left renal cortical thinning and atrophy. 2. Mildly elevated bilateral arcuate resistive indices. Electronically signed by Dr. Tyrese Sarah    Cardiac procedure    Result Date: 6/17/2024    Successful PCI of RCA STEMI using OCT guidance followed by 3 overlapping SANTA       DVT prophylaxis: [] Lovenox                                 [] SCDs                                 [] SQ Heparin                                 [] Encourage ambulation           [] Already on Anticoagulation     Code Status: Full Code    Tele:   [] yes             [] no    Active Hospital Problems    Diagnosis Date Noted    Primary NSCLC (HCC) [C34.90] 07/02/2024    Lung collapse [J98.19] 06/27/2024    Tobacco smoke exposure [Z77.22] 06/24/2024    Mediastinal lymphadenopathy [R59.0] 06/24/2024    Lung mass [R91.8] 06/22/2024    Severe malnutrition (HCC) [E43] 06/20/2024     Class: Acute    STEMI (ST elevation myocardial infarction) (HCC) [I21.3] 06/17/2024       Electronically signed by Antonio Patricia MD on 7/2/2024 at 1:03 PM

## 2024-07-02 NOTE — PROGRESS NOTES
[facility]  STRZ CCU-STEPDOWN 3B  Occupational Therapy  Daily Note  Time:    Time In: 937  Time Out: 1002  Timed Code Treatment Minutes: 25 Minutes  Minutes: 25          Date: 2024  Patient Name: Erin Hanna,   Gender: female      Room: [facility]25/025-A  MRN: [id number]  : 1945  (79 y.o.)  Referring Practitioner: Gifty Castle APRN - CNP  Diagnosis: STEMI  Additional Pertinent Hx: Per EMR: 89-year-old female with history of hypertension and CKD 3A presenting with worsening shortness of breath.  This been going on for the past few days.  Woke up very early this morning with severe shortness of breath.  Denied any chest pain/arm pain/jaw pain at that time.  Denied palpitations.  Denied nausea or vomiting.  Was brought to the ED, EKG showed inferolateral ST elevations, elevated troponin at 136.  No prior history of stents.  Interventional cardiology consulted, patient taken for emergent cath, PCI w/ SANTA x 3 to RCA.    Restrictions/Precautions:  Restrictions/Precautions: Fall Risk      Social/Functional History:  Lives With: Alone  Type of Home: House (Duplex)  Home Layout: Two level (Daughter lives on 1st floor, patient prefers the 2nd story)  Home Access: Stairs to enter without rails  Entrance Stairs - Number of Steps: 3 JORDYN without handrails, but planning to install them. Full flight of steps to 2nd story with 1 handrail.  Home Equipment: None   Bathroom Shower/Tub: Tub/Shower unit  Bathroom Toilet: Standard  Bathroom Equipment: None       ADL Assistance: Independent  Homemaking Assistance: Independent  Homemaking Responsibilities: Yes (Takes care of her own housekeeping needs.)  Ambulation Assistance: Independent  Transfer Assistance: Independent    Active : Yes     Additional Comments: Patient reporting she was IND PTA without use of an AD, was not on oxygen PTA. Patient states her daughter works during the day but has granddtr here from [place] and thinking about staying

## 2024-07-02 NOTE — CARE COORDINATION
7/2/24, 8:59 AM EDT    DISCHARGE PLANNING EVALUATION    Called Tere at [facility].  They are meeting soon to determine if they can accept.  Patient will be a precert.      Update 2:01 pm: Spoke with Tere at [facility].  The facility is concerned that the patient may decide to get Cancer treatment.  Talked to Erin and she doesn't know what she wants.  She did say that she is too week to get any treatment at this time.Told them that she will be skilled under insurance and would most likely be discharged from therapy before treatment could even be started.       Update 3:12 pm: Made Erin aware SW is waiting to hear back from [facility] regarding their ability to accept at discharge.

## 2024-07-02 NOTE — PROGRESS NOTES
Pt was sitting on the chair beside her bed and was alone. She was dealing with heart issues. She was encouraged and blessed.    07/02/24 1409   Encounter Summary   Service Provided For Patient   Referral/Consult From [facility]   Support System Family members   Last Encounter  07/02/24   Complexity of Encounter Low   Begin Time 1055   End Time  1100   Total Time Calculated 5 min   Spiritual/Emotional needs   Type Spiritual Support   Assessment/Intervention/Outcome   Assessment Hopeful   Intervention Empowerment   Outcome Encouraged

## 2024-07-02 NOTE — PROGRESS NOTES
needed.    Restrictions/Precautions:  Restrictions/Precautions: Fall Risk     SUBJECTIVE: Patient is supine in bed upon arrival. RN approves patient for PT today. Patient states that she has been soiled since early this morning.     PAIN: denies    Vitals: Nurse checked vitals prior to session 2L NCO2    OBJECTIVE:  Bed Mobility:  Supine to Sit: Contact Guard Assistance    Transfers:  Sit to Stand: Minimal Assistance  Stand to Sit:Contact Guard Assistance    Ambulation:  Contact Guard Assistance  Distance: 4 ft  Surface: Level Tile  Device: Rolling Walker  Gait Deviations:  Forward Flexed Posture, Slow Marcella, Decreased Weight Shift Bilaterally, Decreased Heel Strike Bilaterally, Narrow Base of Support, Mild Path Deviations, Decreased Terminal Knee Extension, and Increased reliance on assistive device    Balance:  Static Sitting Balance:  Stand By Assistance  Static Standing Balance: Contact Guard Assistance    Exercise:  Not tested    Functional Outcome Measures:  [facility] (6 CLICK) BASIC MOBILITY  AM-PAC Inpatient Mobility Raw Score : 17  AM-PAC Inpatient T-Scale Score : 42.13  Mobility Inpatient CMS 0-100% Score: 50.57  Mobility Inpatient CMS G-Code Modifier : CK        Modified Horacio Scale:  Not Applicable    ASSESSMENT:  Assessment: Patient progressing toward established goals.  Activity Tolerance:  Patient tolerance of  treatment: good. Patient has good tolerance with today's activities. She becomes fatigued and SOB with bed mobility, transfers and ambulation. Seated rest break needed between therapeutic activities. Decrease strength and endurance with all activities. She would continue to benefit from continued PT to address deficits. Recommend SNF placement or inpatient therapy  Equipment Recommendations:Equipment Needed: Yes (would benefit from RW at discharge)  Discharge Recommendations: Continue to assess pending progress, Patient would benefit from continued PT at discharge, and Inpatient Therapy

## 2024-07-03 LAB
ANION GAP SERPL CALC-SCNC: 7 MEQ/L (ref 8–16)
BASOPHILS ABSOLUTE: 0 THOU/MM3 (ref 0–0.1)
BASOPHILS NFR BLD AUTO: 0.1 %
BUN SERPL-MCNC: 16 MG/DL (ref 7–22)
CALCIUM SERPL-MCNC: 8.4 MG/DL (ref 8.5–10.5)
CHLORIDE SERPL-SCNC: 106 MEQ/L (ref 98–111)
CO2 SERPL-SCNC: 27 MEQ/L (ref 23–33)
CREAT SERPL-MCNC: 0.7 MG/DL (ref 0.4–1.2)
DEPRECATED RDW RBC AUTO: 50.4 FL (ref 35–45)
EOSINOPHIL NFR BLD AUTO: 0.3 %
EOSINOPHILS ABSOLUTE: 0 THOU/MM3 (ref 0–0.4)
ERYTHROCYTE [DISTWIDTH] IN BLOOD BY AUTOMATED COUNT: 14.7 % (ref 11.5–14.5)
GFR SERPL CREATININE-BSD FRML MDRD: 88 ML/MIN/1.73M2
GLUCOSE SERPL-MCNC: 116 MG/DL (ref 70–108)
HCT VFR BLD AUTO: 26.4 % (ref 37–47)
HCT VFR BLD AUTO: 26.5 % (ref 37–47)
HCT VFR BLD AUTO: 27.1 % (ref 37–47)
HGB BLD-MCNC: 8.1 GM/DL (ref 12–16)
HGB BLD-MCNC: 8.3 GM/DL (ref 12–16)
HGB BLD-MCNC: 8.3 GM/DL (ref 12–16)
IMM GRANULOCYTES # BLD AUTO: 0.07 THOU/MM3 (ref 0–0.07)
IMM GRANULOCYTES NFR BLD AUTO: 0.7 %
LYMPHOCYTES ABSOLUTE: 0.7 THOU/MM3 (ref 1–4.8)
LYMPHOCYTES NFR BLD AUTO: 6.7 %
MCH RBC QN AUTO: 29.7 PG (ref 26–33)
MCHC RBC AUTO-ENTMCNC: 31.4 GM/DL (ref 32.2–35.5)
MCV RBC AUTO: 94.6 FL (ref 81–99)
MONOCYTES ABSOLUTE: 0.7 THOU/MM3 (ref 0.4–1.3)
MONOCYTES NFR BLD AUTO: 6.5 %
NEUTROPHILS ABSOLUTE: 9.1 THOU/MM3 (ref 1.8–7.7)
NEUTROPHILS NFR BLD AUTO: 85.7 %
NRBC BLD AUTO-RTO: 0 /100 WBC
PLATELET # BLD AUTO: 169 THOU/MM3 (ref 130–400)
PMV BLD AUTO: 11.3 FL (ref 9.4–12.4)
POTASSIUM SERPL-SCNC: 3.8 MEQ/L (ref 3.5–5.2)
RBC # BLD AUTO: 2.79 MILL/MM3 (ref 4.2–5.4)
SODIUM SERPL-SCNC: 140 MEQ/L (ref 135–145)
WBC # BLD AUTO: 10.6 THOU/MM3 (ref 4.8–10.8)

## 2024-07-03 PROCEDURE — 97110 THERAPEUTIC EXERCISES: CPT

## 2024-07-03 PROCEDURE — 85014 HEMATOCRIT: CPT

## 2024-07-03 PROCEDURE — 94640 AIRWAY INHALATION TREATMENT: CPT

## 2024-07-03 PROCEDURE — 6370000000 HC RX 637 (ALT 250 FOR IP): Performed by: PHYSICIAN ASSISTANT

## 2024-07-03 PROCEDURE — 2140000000 HC CCU INTERMEDIATE R&B

## 2024-07-03 PROCEDURE — 2580000003 HC RX 258: Performed by: INTERNAL MEDICINE

## 2024-07-03 PROCEDURE — 6370000000 HC RX 637 (ALT 250 FOR IP): Performed by: NURSE PRACTITIONER

## 2024-07-03 PROCEDURE — 97116 GAIT TRAINING THERAPY: CPT

## 2024-07-03 PROCEDURE — 6370000000 HC RX 637 (ALT 250 FOR IP): Performed by: INTERNAL MEDICINE

## 2024-07-03 PROCEDURE — 6370000000 HC RX 637 (ALT 250 FOR IP)

## 2024-07-03 PROCEDURE — 6370000000 HC RX 637 (ALT 250 FOR IP): Performed by: HOSPITALIST

## 2024-07-03 PROCEDURE — 85018 HEMOGLOBIN: CPT

## 2024-07-03 PROCEDURE — 6360000002 HC RX W HCPCS: Performed by: NURSE PRACTITIONER

## 2024-07-03 PROCEDURE — 85025 COMPLETE CBC W/AUTO DIFF WBC: CPT

## 2024-07-03 PROCEDURE — 36415 COLL VENOUS BLD VENIPUNCTURE: CPT

## 2024-07-03 PROCEDURE — 6360000002 HC RX W HCPCS: Performed by: HOSPITALIST

## 2024-07-03 PROCEDURE — 80048 BASIC METABOLIC PNL TOTAL CA: CPT

## 2024-07-03 PROCEDURE — 2700000000 HC OXYGEN THERAPY PER DAY

## 2024-07-03 PROCEDURE — 99232 SBSQ HOSP IP/OBS MODERATE 35: CPT | Performed by: INTERNAL MEDICINE

## 2024-07-03 RX ADMIN — AMIODARONE HYDROCHLORIDE 200 MG: 200 TABLET ORAL at 08:51

## 2024-07-03 RX ADMIN — ATORVASTATIN CALCIUM 40 MG: 40 TABLET, FILM COATED ORAL at 19:51

## 2024-07-03 RX ADMIN — PANTOPRAZOLE SODIUM 40 MG: 40 INJECTION, POWDER, FOR SOLUTION INTRAVENOUS at 08:52

## 2024-07-03 RX ADMIN — GUAIFENESIN 600 MG: 600 TABLET, EXTENDED RELEASE ORAL at 08:51

## 2024-07-03 RX ADMIN — ALBUTEROL SULFATE 2.5 MG: 2.5 SOLUTION RESPIRATORY (INHALATION) at 04:21

## 2024-07-03 RX ADMIN — SODIUM CHLORIDE, PRESERVATIVE FREE 10 ML: 5 INJECTION INTRAVENOUS at 08:51

## 2024-07-03 RX ADMIN — FLUTICASONE PROPIONATE 1 SPRAY: 50 SPRAY, METERED NASAL at 08:51

## 2024-07-03 RX ADMIN — GUAIFENESIN 600 MG: 600 TABLET, EXTENDED RELEASE ORAL at 19:51

## 2024-07-03 RX ADMIN — METOPROLOL SUCCINATE 50 MG: 50 TABLET, EXTENDED RELEASE ORAL at 08:51

## 2024-07-03 RX ADMIN — ASPIRIN 81 MG 81 MG: 81 TABLET ORAL at 08:51

## 2024-07-03 RX ADMIN — PANTOPRAZOLE SODIUM 40 MG: 40 INJECTION, POWDER, FOR SOLUTION INTRAVENOUS at 19:52

## 2024-07-03 RX ADMIN — TICAGRELOR 90 MG: 90 TABLET ORAL at 08:51

## 2024-07-03 RX ADMIN — TICAGRELOR 90 MG: 90 TABLET ORAL at 19:51

## 2024-07-03 RX ADMIN — BENZONATATE 200 MG: 100 CAPSULE ORAL at 08:51

## 2024-07-03 RX ADMIN — TIOTROPIUM BROMIDE AND OLODATEROL 2 PUFF: 3.124; 2.736 SPRAY, METERED RESPIRATORY (INHALATION) at 08:34

## 2024-07-03 RX ADMIN — SODIUM CHLORIDE, PRESERVATIVE FREE 10 ML: 5 INJECTION INTRAVENOUS at 19:52

## 2024-07-03 NOTE — PLAN OF CARE
Problem: Respiratory - Adult  Goal: Achieves optimal ventilation and oxygenation  Outcome: Progressing  Flowsheets (Taken 7/3/2024 6355)  Achieves optimal ventilation and oxygenation:   Assess for changes in respiratory status   Respiratory therapy support as indicated   Assess and instruct to report shortness of breath or any respiratory difficulty

## 2024-07-03 NOTE — CARE COORDINATION
7/3/24, 2:18 PM EDT    DISCHARGE ON GOING EVALUATION    Erin Hanna       [facility] day: 16  Location: -[facility] [place] Reason for admit: STEMI (ST elevation myocardial infarction) (HCC) [I21.3]  ST elevation myocardial infarction (STEMI), unspecified artery (HCC) [I21.3]     Procedures:   6/17 [facility] with Dr. Fuller: Successful PCI of RCA STEMI using OCT guidance followed by 3 overlapping SANTA.   6/18 Echo: EF 40-45%. Mild global hypokinesis of left ventricle. Aortic valve: Moderately thickened and calcified cusp. Mitral valve: Moderately thickened and calcified leaflet, at the posterior leaflet. Left atrium: severely dilated. Right atrium: moderately dilated.   6/24 EBUS with Dr. Lugo: Endobronchial ultrasound with biopsy of mass and lymph nodes (3 nodes) and 1 mass.   Station 4L 1.5 cm - tbna x 3 - slick negative  Station 7 2 cm - tbna x 3 - slick lymphocytes  Station 4R - small < 1cm - no biopsy  Station 10 R - large 2.5 cm - SLICK: positive non-small  Right lower lobe mass large > 4cm - SLICK: positive non-small.    Imaging since last note: None    Barriers to Discharge: Hospitalist, Oncology, GI and Pulmonology following. Cardiology prn. PT/OT. Protonix iv bid. Awaiting placement.     PCP: Claudine Gutierrez APRN - CNP  Readmission Risk Score: 12.8    Patient Goals/Plan/Treatment Preferences: From home alone. SW following, planning SNF, awaiting acceptance.

## 2024-07-03 NOTE — PROGRESS NOTES
6/17/2024  PROCEDURE: US RENAL COMPLETE CLINICAL INFORMATION: Acute kidney injury TECHNIQUE: Ultrasound of the kidneys and urinary bladder was performed. Grayscale and color images were obtained. COMPARISON: Renal ultrasound 9/22/2021 FINDINGS: The right kidney measures 10.0 x 4.2 x 5.0 cm and the left kidney measures 5.5 x 4.0 x 3.3 cm. There is cortical thinning on the left side. There is no hydronephrosis, calculi or intrarenal mass. Color Doppler demonstrates expected waveforms in the bilateral renal arteries. Arcuate resistive indices are mildly elevated at 0.8 bilaterally. There is a catheter in the urinary bladder.     1. Left renal cortical thinning and atrophy. 2. Mildly elevated bilateral arcuate resistive indices. Electronically signed by Dr. Tyrese Sarah    Cardiac procedure    Result Date: 6/17/2024    Successful PCI of RCA STEMI using OCT guidance followed by 3 overlapping SANTA       DVT prophylaxis: [] Lovenox                                 [] SCDs                                 [x] SQ Heparin                                 [] Encourage ambulation           [] Already on Anticoagulation     Code Status: Full Code    Tele:   [x] yes             [] no    Active Hospital Problems    Diagnosis Date Noted    Primary NSCLC (HCC) [C34.90] 07/02/2024    Lung collapse [J98.19] 06/27/2024    Tobacco smoke exposure [Z77.22] 06/24/2024    Mediastinal lymphadenopathy [R59.0] 06/24/2024    Lung mass [R91.8] 06/22/2024    Severe malnutrition (HCC) [E43] 06/20/2024     Class: Acute    STEMI (ST elevation myocardial infarction) (HCC) [I21.3] 06/17/2024       Electronically signed by Dread Farias MD on 7/3/2024 at 4:25 PM

## 2024-07-03 NOTE — PLAN OF CARE
Problem: Discharge Planning  Goal: Discharge to home or other facility with appropriate resources  Outcome: Progressing  Flowsheets (Taken 7/2/2024 2345)  Discharge to home or other facility with appropriate resources: Identify barriers to discharge with patient and caregiver     Problem: ABCDS Injury Assessment  Goal: Absence of physical injury  Outcome: Progressing  Flowsheets (Taken 7/2/2024 2345)  Absence of Physical Injury: Implement safety measures based on patient assessment     Problem: Safety - Adult  Goal: Free from fall injury  Outcome: Progressing  Flowsheets (Taken 7/2/2024 2345)  Free From Fall Injury: Instruct family/caregiver on patient safety     Problem: Cardiovascular - Adult  Goal: Maintains optimal cardiac output and hemodynamic stability  Outcome: Progressing  Flowsheets (Taken 7/2/2024 2345)  Maintains optimal cardiac output and hemodynamic stability: Monitor blood pressure and heart rate     Problem: Cardiovascular - Adult  Goal: Absence of cardiac dysrhythmias or at baseline  Outcome: Progressing  Flowsheets (Taken 7/2/2024 2345)  Absence of cardiac dysrhythmias or at baseline: Monitor cardiac rate and rhythm     Problem: Skin/Tissue Integrity - Adult  Goal: Skin integrity remains intact  Outcome: Progressing  Flowsheets (Taken 7/2/2024 2345)  Skin Integrity Remains Intact: Monitor for areas of redness and/or skin breakdown     Problem: Pain  Goal: Verbalizes/displays adequate comfort level or baseline comfort level  Outcome: Progressing  Flowsheets (Taken 7/2/2024 2345)  Verbalizes/displays adequate comfort level or baseline comfort level:   Encourage patient to monitor pain and request assistance   Assess pain using appropriate pain scale     Problem: Chronic Conditions and Co-morbidities  Goal: Patient's chronic conditions and co-morbidity symptoms are monitored and maintained or improved  Outcome: Progressing  Flowsheets (Taken 7/2/2024 2345)  Care Plan - Patient's Chronic Conditions  and Co-Morbidity Symptoms are Monitored and Maintained or Improved: Monitor and assess patient's chronic conditions and comorbid symptoms for stability, deterioration, or improvement     Problem: Nutrition Deficit:  Goal: Optimize nutritional status  Outcome: Progressing  Flowsheets (Taken 7/2/2024 0313)  Nutrient intake appropriate for improving, restoring, or maintaining nutritional needs: Assess nutritional status and recommend course of action     Care plan reviewed with patient.  Patient verbalizes understanding of the care plan and contributed to goal setting.

## 2024-07-03 NOTE — PLAN OF CARE
Problem: Discharge Planning  Goal: Discharge to home or other facility with appropriate resources  7/3/2024 0908 by Jennifer Dailey RN  Outcome: Progressing  7/2/2024 2345 by Juan Antonio Cortes RN  Outcome: Progressing  Flowsheets (Taken 7/2/2024 2345)  Discharge to home or other facility with appropriate resources: Identify barriers to discharge with patient and caregiver     Problem: ABCDS Injury Assessment  Goal: Absence of physical injury  7/3/2024 0908 by Jennifer Dailey RN  Outcome: Progressing  7/2/2024 2345 by Juan Antonio Cortes RN  Outcome: Progressing  Flowsheets (Taken 7/2/2024 2345)  Absence of Physical Injury: Implement safety measures based on patient assessment     Problem: Safety - Adult  Goal: Free from fall injury  7/3/2024 0908 by Jennifer Dailey RN  Outcome: Progressing  7/2/2024 2345 by Juan Antonio Cortes RN  Outcome: Progressing  Flowsheets (Taken 7/2/2024 2345)  Free From Fall Injury: Instruct family/caregiver on patient safety     Problem: Respiratory - Adult  Goal: Achieves optimal ventilation and oxygenation  7/3/2024 0908 by Jennifer Dailey RN  Outcome: Progressing  7/3/2024 0838 by Kisha Zheng RCP  Outcome: Progressing  Flowsheets (Taken 7/3/2024 0838)  Achieves optimal ventilation and oxygenation:   Assess for changes in respiratory status   Respiratory therapy support as indicated   Assess and instruct to report shortness of breath or any respiratory difficulty     Problem: Cardiovascular - Adult  Goal: Maintains optimal cardiac output and hemodynamic stability  7/3/2024 0908 by Jennifer Dailey RN  Outcome: Progressing  7/2/2024 2345 by Juan Antonio Cortes RN  Outcome: Progressing  Flowsheets (Taken 7/2/2024 2345)  Maintains optimal cardiac output and hemodynamic stability: Monitor blood pressure and heart rate  Goal: Absence of cardiac dysrhythmias or at baseline  7/3/2024 0908 by Jennifer Dailey RN  Outcome: Progressing  7/2/2024 2345  nutritional status  7/3/2024 0908 by Jennifer Dailey, RN  Outcome: Progressing  7/2/2024 2345 by Juan Antonio Cortes RN  Outcome: Progressing  Flowsheets (Taken 7/2/2024 2345)  Nutrient intake appropriate for improving, restoring, or maintaining nutritional needs: Assess nutritional status and recommend course of action

## 2024-07-03 NOTE — PROGRESS NOTES
Comprehensive Nutrition Assessment    Type and Reason for Visit:  Reassess (Severe Malnutrition; PO monitor)    Nutrition Recommendations/Plan:   Continue regular diet and encourage adequate PO intake at best efforts  Continue ONS: Ensure PLUS (TID) - continue at discharge  Recommend appetite stimulant use as pt with poor appetite/PO intake throughout LOS of 16 days now     Malnutrition Assessment:  Malnutrition Status:  Severe malnutrition (06/20/24 1216)    Context:  Acute Illness     Findings of the 6 clinical characteristics of malnutrition:  Energy Intake:  50% or less of estimated energy requirements for 5 or more days  Weight Loss:   (reports ~5.3% in few months (u/a confirm))     Body Fat Loss:  Moderate body fat loss Orbital, Triceps, Fat Overlying Ribs, Buccal region   Muscle Mass Loss:  Moderate muscle mass loss Temples (temporalis), Clavicles (pectoralis & deltoids), Hand (interosseous)  Fluid Accumulation:  Unable to assess     Strength:  Not Performed    Nutrition Assessment:     Pt. with no improvement from a nutritional standpoint AEB PO intakes continuing to be documented at 50% or less but is tolerating ONS.  Remains at risk for further nutritional compromise r/t STEMI s/p PCI, R lower lobe mass with collapse - EBUS 6/24/24 - positive bx for NSCLC, CKD III, advanced age, extended LOS and underlying medical condition (PMHx HTN, former smoker).     Nutrition Related Findings:    Pt. Report/Treatments/Miscellaneous: Pt seen, working on breakfast tray - c/o frequently receiving cold food. Pt states she is doing what she can. She does like ONS and agrees to continue - discussed desired weight gain.   GI Status: BM - 7/2; poor appetite continuing   Pertinent Labs: Glucose 116  Pertinent Meds: lipitor, protonix, brilinta     Wound Type: None       Current Nutrition Intake & Therapies:    Average Meal Intake:  (mostly 50% or less)  Average Supplements Intake:  (likes ensure plus)  ADULT DIET;

## 2024-07-03 NOTE — CARE COORDINATION
7/3/24, 1:53 PM EDT    DISCHARGE PLANNING EVALUATION    Spoke with Tere from [facility], they declined patient. Patient could not give definitive answer on whether she wants cancer treatments.  They can bring her in the facility and then patient decided to proceed with cancer treatment.     2:01 PM  Spoke with Todd at [facility], referral made.    3:30 PM  Received message from Todd, [facility] will accept.  Starting precert today 7/3.    Spoke with patients daughter Kari, informed [facility] accepted and precert starting today.    Spoke with Oksana KLEIN, she will inform patient [facility] accepted.

## 2024-07-03 NOTE — PROGRESS NOTES
[facility]  INPATIENT PHYSICAL THERAPY  DAILY NOTE  STRZ CCU-STEPDOWN 3B - 3B-25/025-A    Time In: 1154  Time Out: 1218  Timed Code Treatment Minutes: 24 Minutes  Minutes: 24          Date: 7/3/2024  Patient Name: Erin Hanna,  Gender:  female        MRN: [id number]  : 1945  (79 y.o.)     Referring Practitioner: Gifty Castle APRN - CNP  Diagnosis: STEMI  Additional Pertinent Hx: Per EMR: 89-year-old female with history of hypertension and CKD 3A presenting with worsening shortness of breath.  This been going on for the past few days.  Woke up very early this morning with severe shortness of breath.  Denied any chest pain/arm pain/jaw pain at that time.  Denied palpitations.  Denied nausea or vomiting.  Was brought to the ED, EKG showed inferolateral ST elevations, elevated troponin at 136.  No prior history of stents.  Interventional cardiology consulted, patient taken for emergent cath, PCI w/ SANTA x 3 to RCA. s/p BRONCHOSCOPY ENDOBRONCHIAL ULTRASOUND      Prior Level of Function:  Lives With: Alone  Type of Home: House (Duplex)  Home Layout: Two level (Daughter lives on 1st floor, patient prefers the 2nd story)  Home Access: Stairs to enter without rails  Entrance Stairs - Number of Steps: 3 JORDYN without handrails, but planning to install them. Full flight of steps to 2nd story with 1 handrail.  Home Equipment: None   Bathroom Shower/Tub: Tub/Shower unit  Bathroom Toilet: Standard  Bathroom Equipment: None    ADL Assistance: Independent  Homemaking Assistance: Independent  Homemaking Responsibilities: Yes (Takes care of her own housekeeping needs.)  Ambulation Assistance: Independent  Transfer Assistance: Independent  Active : Yes  Additional Comments: Patient reporting she was IND PTA without use of an AD, was not on oxygen PTA. Patient states her daughter works during the day but has granddtr here from [place] and thinking about staying for the summer to assist if  Patient  Patient Education: Plan of Care, Transfers, Gait, Verbal Exercise Instruction    Goals:  Patient Goals : Pt goal to return home  Short Term Goals  Time Frame for Short Term Goals: By discharge  Short Term Goal 1: Supine to/from sit at mod I to get in/out of bed.  Short Term Goal 2: Sit to/from stand at mod I to get up to walk  Short Term Goal 3: Ambulate 150 feet with RW at Mod I to walk in home safely  Short Term Goal 4: Ascend/Descend full flight of steps with 1 handrail at CGA to enter/exit 2nd floor of home.  Short Term Goal 5: Ascend/Descend 3 steps with HHA at CGA to enter/exit home.  Long Term Goals  Time Frame for Long Term Goals : NA due to short ELOS    Following session, patient left in safe position with all fall risk precautions in place.

## 2024-07-04 ENCOUNTER — APPOINTMENT (OUTPATIENT)
Age: 79
End: 2024-07-04
Attending: NURSE PRACTITIONER
Payer: MEDICARE

## 2024-07-04 LAB
ABO: NORMAL
ANION GAP SERPL CALC-SCNC: 9 MEQ/L (ref 8–16)
ANTIBODY SCREEN: NORMAL
BASOPHILS ABSOLUTE: 0 THOU/MM3 (ref 0–0.1)
BASOPHILS NFR BLD AUTO: 0.1 %
BUN SERPL-MCNC: 16 MG/DL (ref 7–22)
CALCIUM SERPL-MCNC: 8.2 MG/DL (ref 8.5–10.5)
CHLORIDE SERPL-SCNC: 105 MEQ/L (ref 98–111)
CO2 SERPL-SCNC: 26 MEQ/L (ref 23–33)
CREAT SERPL-MCNC: 0.8 MG/DL (ref 0.4–1.2)
DEPRECATED RDW RBC AUTO: 53.8 FL (ref 35–45)
EKG ATRIAL RATE: 124 BPM
EKG ATRIAL RATE: 72 BPM
EKG P AXIS: -16 DEGREES
EKG P AXIS: 55 DEGREES
EKG P-R INTERVAL: 130 MS
EKG P-R INTERVAL: 164 MS
EKG Q-T INTERVAL: 314 MS
EKG Q-T INTERVAL: 400 MS
EKG QRS DURATION: 84 MS
EKG QRS DURATION: 86 MS
EKG QTC CALCULATION (BAZETT): 438 MS
EKG QTC CALCULATION (BAZETT): 451 MS
EKG R AXIS: 12 DEGREES
EKG R AXIS: 19 DEGREES
EKG T AXIS: 61 DEGREES
EKG T AXIS: 9 DEGREES
EKG VENTRICULAR RATE: 124 BPM
EKG VENTRICULAR RATE: 72 BPM
EOSINOPHIL NFR BLD AUTO: 0.2 %
EOSINOPHILS ABSOLUTE: 0 THOU/MM3 (ref 0–0.4)
ERYTHROCYTE [DISTWIDTH] IN BLOOD BY AUTOMATED COUNT: 15.1 % (ref 11.5–14.5)
GFR SERPL CREATININE-BSD FRML MDRD: 75 ML/MIN/1.73M2
GLUCOSE SERPL-MCNC: 112 MG/DL (ref 70–108)
HCT VFR BLD AUTO: 23.8 % (ref 37–47)
HCT VFR BLD AUTO: 25.4 % (ref 37–47)
HGB BLD-MCNC: 7.7 GM/DL (ref 12–16)
HGB BLD-MCNC: 7.8 GM/DL (ref 12–16)
IMM GRANULOCYTES # BLD AUTO: 0.06 THOU/MM3 (ref 0–0.07)
IMM GRANULOCYTES NFR BLD AUTO: 0.5 %
LYMPHOCYTES ABSOLUTE: 0.7 THOU/MM3 (ref 1–4.8)
LYMPHOCYTES NFR BLD AUTO: 6.1 %
MCH RBC QN AUTO: 30.4 PG (ref 26–33)
MCHC RBC AUTO-ENTMCNC: 30.7 GM/DL (ref 32.2–35.5)
MCV RBC AUTO: 98.8 FL (ref 81–99)
MONOCYTES ABSOLUTE: 0.7 THOU/MM3 (ref 0.4–1.3)
MONOCYTES NFR BLD AUTO: 6.3 %
NEUTROPHILS ABSOLUTE: 10.2 THOU/MM3 (ref 1.8–7.7)
NEUTROPHILS NFR BLD AUTO: 86.8 %
NRBC BLD AUTO-RTO: 0 /100 WBC
PLATELET # BLD AUTO: 178 THOU/MM3 (ref 130–400)
PMV BLD AUTO: 11.9 FL (ref 9.4–12.4)
POTASSIUM SERPL-SCNC: 4.1 MEQ/L (ref 3.5–5.2)
RBC # BLD AUTO: 2.57 MILL/MM3 (ref 4.2–5.4)
RH FACTOR: NORMAL
SODIUM SERPL-SCNC: 140 MEQ/L (ref 135–145)
WBC # BLD AUTO: 11.8 THOU/MM3 (ref 4.8–10.8)

## 2024-07-04 PROCEDURE — 6370000000 HC RX 637 (ALT 250 FOR IP): Performed by: NURSE PRACTITIONER

## 2024-07-04 PROCEDURE — 86900 BLOOD TYPING SEROLOGIC ABO: CPT

## 2024-07-04 PROCEDURE — 6370000000 HC RX 637 (ALT 250 FOR IP): Performed by: INTERNAL MEDICINE

## 2024-07-04 PROCEDURE — 36415 COLL VENOUS BLD VENIPUNCTURE: CPT

## 2024-07-04 PROCEDURE — 94640 AIRWAY INHALATION TREATMENT: CPT

## 2024-07-04 PROCEDURE — 93306 TTE W/DOPPLER COMPLETE: CPT

## 2024-07-04 PROCEDURE — 85025 COMPLETE CBC W/AUTO DIFF WBC: CPT

## 2024-07-04 PROCEDURE — 99221 1ST HOSP IP/OBS SF/LOW 40: CPT | Performed by: NURSE PRACTITIONER

## 2024-07-04 PROCEDURE — 6360000002 HC RX W HCPCS: Performed by: HOSPITALIST

## 2024-07-04 PROCEDURE — 6370000000 HC RX 637 (ALT 250 FOR IP): Performed by: HOSPITALIST

## 2024-07-04 PROCEDURE — 2140000000 HC CCU INTERMEDIATE R&B

## 2024-07-04 PROCEDURE — 93010 ELECTROCARDIOGRAM REPORT: CPT | Performed by: INTERNAL MEDICINE

## 2024-07-04 PROCEDURE — 86923 COMPATIBILITY TEST ELECTRIC: CPT

## 2024-07-04 PROCEDURE — 6360000002 HC RX W HCPCS: Performed by: NURSE PRACTITIONER

## 2024-07-04 PROCEDURE — 6370000000 HC RX 637 (ALT 250 FOR IP)

## 2024-07-04 PROCEDURE — 94761 N-INVAS EAR/PLS OXIMETRY MLT: CPT

## 2024-07-04 PROCEDURE — 85014 HEMATOCRIT: CPT

## 2024-07-04 PROCEDURE — 80048 BASIC METABOLIC PNL TOTAL CA: CPT

## 2024-07-04 PROCEDURE — 2700000000 HC OXYGEN THERAPY PER DAY

## 2024-07-04 PROCEDURE — 86850 RBC ANTIBODY SCREEN: CPT

## 2024-07-04 PROCEDURE — 93005 ELECTROCARDIOGRAM TRACING: CPT | Performed by: NURSE PRACTITIONER

## 2024-07-04 PROCEDURE — 93005 ELECTROCARDIOGRAM TRACING: CPT | Performed by: INTERNAL MEDICINE

## 2024-07-04 PROCEDURE — P9016 RBC LEUKOCYTES REDUCED: HCPCS

## 2024-07-04 PROCEDURE — 85018 HEMOGLOBIN: CPT

## 2024-07-04 PROCEDURE — 36430 TRANSFUSION BLD/BLD COMPNT: CPT

## 2024-07-04 PROCEDURE — 2580000003 HC RX 258: Performed by: INTERNAL MEDICINE

## 2024-07-04 PROCEDURE — 6370000000 HC RX 637 (ALT 250 FOR IP): Performed by: PHYSICIAN ASSISTANT

## 2024-07-04 PROCEDURE — 99233 SBSQ HOSP IP/OBS HIGH 50: CPT | Performed by: INTERNAL MEDICINE

## 2024-07-04 PROCEDURE — 86901 BLOOD TYPING SEROLOGIC RH(D): CPT

## 2024-07-04 RX ORDER — METOPROLOL SUCCINATE 25 MG/1
12.5 TABLET, EXTENDED RELEASE ORAL DAILY
Status: DISCONTINUED | OUTPATIENT
Start: 2024-07-04 | End: 2024-07-05

## 2024-07-04 RX ORDER — HYDROXYZINE PAMOATE 25 MG/1
25 CAPSULE ORAL 3 TIMES DAILY PRN
Status: DISCONTINUED | OUTPATIENT
Start: 2024-07-04 | End: 2024-07-06 | Stop reason: HOSPADM

## 2024-07-04 RX ORDER — SODIUM CHLORIDE 9 MG/ML
INJECTION, SOLUTION INTRAVENOUS PRN
Status: DISCONTINUED | OUTPATIENT
Start: 2024-07-04 | End: 2024-07-06 | Stop reason: HOSPADM

## 2024-07-04 RX ORDER — BUSPIRONE HYDROCHLORIDE 5 MG/1
5 TABLET ORAL 3 TIMES DAILY
Status: DISCONTINUED | OUTPATIENT
Start: 2024-07-04 | End: 2024-07-06 | Stop reason: HOSPADM

## 2024-07-04 RX ADMIN — AMIODARONE HYDROCHLORIDE 200 MG: 200 TABLET ORAL at 10:21

## 2024-07-04 RX ADMIN — SODIUM CHLORIDE, PRESERVATIVE FREE 10 ML: 5 INJECTION INTRAVENOUS at 10:22

## 2024-07-04 RX ADMIN — TICAGRELOR 90 MG: 90 TABLET ORAL at 10:21

## 2024-07-04 RX ADMIN — METOPROLOL SUCCINATE 50 MG: 50 TABLET, EXTENDED RELEASE ORAL at 10:21

## 2024-07-04 RX ADMIN — PANTOPRAZOLE SODIUM 40 MG: 40 INJECTION, POWDER, FOR SOLUTION INTRAVENOUS at 10:21

## 2024-07-04 RX ADMIN — METOPROLOL SUCCINATE 12.5 MG: 25 TABLET, FILM COATED, EXTENDED RELEASE ORAL at 12:49

## 2024-07-04 RX ADMIN — SODIUM CHLORIDE, PRESERVATIVE FREE 10 ML: 5 INJECTION INTRAVENOUS at 21:28

## 2024-07-04 RX ADMIN — GUAIFENESIN 600 MG: 600 TABLET, EXTENDED RELEASE ORAL at 10:21

## 2024-07-04 RX ADMIN — TICAGRELOR 90 MG: 90 TABLET ORAL at 21:28

## 2024-07-04 RX ADMIN — BUSPIRONE HYDROCHLORIDE 5 MG: 5 TABLET ORAL at 12:51

## 2024-07-04 RX ADMIN — PANTOPRAZOLE SODIUM 40 MG: 40 INJECTION, POWDER, FOR SOLUTION INTRAVENOUS at 19:45

## 2024-07-04 RX ADMIN — HYDROXYZINE PAMOATE 25 MG: 25 CAPSULE ORAL at 06:06

## 2024-07-04 RX ADMIN — ATORVASTATIN CALCIUM 40 MG: 40 TABLET, FILM COATED ORAL at 21:28

## 2024-07-04 RX ADMIN — ASPIRIN 81 MG 81 MG: 81 TABLET ORAL at 10:21

## 2024-07-04 RX ADMIN — ALBUTEROL SULFATE 2.5 MG: 2.5 SOLUTION RESPIRATORY (INHALATION) at 05:26

## 2024-07-04 RX ADMIN — TIOTROPIUM BROMIDE AND OLODATEROL 2 PUFF: 3.124; 2.736 SPRAY, METERED RESPIRATORY (INHALATION) at 05:28

## 2024-07-04 RX ADMIN — GUAIFENESIN 600 MG: 600 TABLET, EXTENDED RELEASE ORAL at 21:28

## 2024-07-04 RX ADMIN — BUSPIRONE HYDROCHLORIDE 5 MG: 5 TABLET ORAL at 21:28

## 2024-07-04 ASSESSMENT — PAIN SCALES - GENERAL
PAINLEVEL_OUTOF10: 0

## 2024-07-04 NOTE — PROCEDURES
PROCEDURE NOTE  Date: 7/4/2024   Name: Erin Hanna  YOB: 1945    Procedures  12 lead EKG completed. Results handed to Elly Ta CET

## 2024-07-04 NOTE — PROGRESS NOTES
Patient called daughter, who had questions about her mother and the blood transfusion. Elly, NP on floor, asked her to speak with patient and daughter.   Elly at bedside and answering all questions

## 2024-07-04 NOTE — PLAN OF CARE
Problem: Discharge Planning  Goal: Discharge to home or other facility with appropriate resources  Outcome: Progressing  Flowsheets (Taken 7/4/2024 0402)  Discharge to home or other facility with appropriate resources: Identify barriers to discharge with patient and caregiver     Problem: ABCDS Injury Assessment  Goal: Absence of physical injury  Outcome: Progressing  Flowsheets (Taken 7/4/2024 0402)  Absence of Physical Injury: Implement safety measures based on patient assessment     Problem: Safety - Adult  Goal: Free from fall injury  Outcome: Progressing  Flowsheets (Taken 7/4/2024 0402)  Free From Fall Injury: Instruct family/caregiver on patient safety     Problem: Respiratory - Adult  Goal: Achieves optimal ventilation and oxygenation  Outcome: Progressing  Flowsheets (Taken 7/4/2024 0402)  Achieves optimal ventilation and oxygenation:   Assess for changes in respiratory status   Assess for changes in mentation and behavior     Problem: Cardiovascular - Adult  Goal: Maintains optimal cardiac output and hemodynamic stability  Outcome: Progressing  Flowsheets (Taken 7/4/2024 0402)  Maintains optimal cardiac output and hemodynamic stability: Monitor blood pressure and heart rate     Problem: Cardiovascular - Adult  Goal: Absence of cardiac dysrhythmias or at baseline  Outcome: Progressing  Flowsheets (Taken 7/4/2024 0402)  Absence of cardiac dysrhythmias or at baseline: Monitor cardiac rate and rhythm     Problem: Skin/Tissue Integrity - Adult  Goal: Skin integrity remains intact  Outcome: Progressing  Flowsheets (Taken 7/4/2024 0402)  Skin Integrity Remains Intact: Monitor for areas of redness and/or skin breakdown     Problem: Pain  Goal: Verbalizes/displays adequate comfort level or baseline comfort level  Outcome: Progressing  Flowsheets (Taken 7/4/2024 0402)  Verbalizes/displays adequate comfort level or baseline comfort level:   Encourage patient to monitor pain and request assistance   Assess pain

## 2024-07-04 NOTE — PROCEDURES
PROCEDURE NOTE  Date: 7/4/2024   Name: Erin Hanna  YOB: 1945    Procedures  12 lead EKG completed. Results handed to Rashad Ta CET

## 2024-07-04 NOTE — FLOWSHEET NOTE
07/04/24 1034   Treatment Team Notification   Reason for Communication Review case  (ekg)   Name of Team Member Notified Jarrett   Treatment Team Role Attending Provider   Method of Communication Secure Message   Response Waiting for response   Notification Time 1030     Patient HR on tele going from 's. EKG obtained, and perfect serve message sent to Dr Farias

## 2024-07-04 NOTE — PROGRESS NOTES
Called by Dr. Fuller regarding EKG he had just reviewed (reader for EKGs today) which showed active ST elevation in anterior lateral leads on this patient. Writer to 3B25 to assess patient. Patient up in chair at bedside. Bright, pleasant, comfortable in no acute distress. Denies chest discomfort, back or neck discomfort; no arm discomfort. No difficulty or change in her breathing. Color normal for race, warm and dry. AP S & R, no murmur, gallop or rub. Lungs clear - diminished RLL. No evidence of fluid volume overload on exam.   Dr. Fuller notified. Repeat EKG obtained. Notes improvement in ST seg elevation but not resolution. Patient remains aSx. Nursing notes if she removes her O2 per nasal cannula she drops her sats and becomes tachycardic. Rhythm issues this AM occurred on her O2.   Discussed with nursing staff. Patient had developed EKG rhythm changes earlier in morning with PVC couplet followed by ~ 3 - 6 sec of SVT at rate of 150 - 170 then spontaneously converting to SR. Patient had repeat episodes of rhythm pattern off and on for some time. Patient aSx with rhythm changes. Metoprolol increased per attending physician Dr. Farias.   Contacted Dr. Farias. Discussed EKG changes, recent hx GIB, known lung CA (has not decided if she wants to pursue treatment) and Hgb 7.8 today. In light of patient being aSx discussed obtaining stat echo to assess LV function - if LV function down will then need to decide if willing  to undergo stat cath and possible intervention. Patient will also be transfused with 2 units PRBCs in attempt to optimize Hgb to >/=8.5 - 9.0.  Stat echo ordered. Transfusion ordered per Dr. Farias. Will continue to monitor. Daughter, Kari, updated per phone. Verbalizes understanding.   Elly Melgar, APRN - CNP

## 2024-07-04 NOTE — PROGRESS NOTES
Hospitalist Progress Note    Patient:  Erin Hanna      Unit/Bed:3B-25/025-A    YOB: 1945    MRN: [id number]       Acct: [id number]     PCP: Claudine Gutierrez APRN - CNP    Date of Admission: 6/17/2024    Assessment/Plan:    STEMI: Patient status post PCI with SANTA to RCA x 3.  Continue with Brilinta and aspirin.  Appreciate cardiology input.  Continue with metoprolol 50 mg daily, atorvastatin 40 mg daily  Ischemic cardiomyopathy: Ejection fraction 40 to 45%.  Does not appear to be volume overloaded during acute CHF exacerbation.  GDMT.  Today with abnormal EKG -discussed with cardiology   Right lower lobe mass with collapse: Patient status post EBUS 6/24/2024.  Biopsy is positive for non-small cell carcinoma.  Discussed with pulmonology and radiation oncology.  Continue with supplemental O2.  Aggressive pulmonary toiletry.  Patient unsure if she wants to proceed with chemo and radiation.    MSSA pneumonia: post obstructive pneumonia. Continue with antibiotics, currently on IV Zosyn x 2 more days, so far cultures only growing out MSSA but given right lung collapse escalate antibiotics to cover for anaerobes  CKD stage III: Avoid nephrotoxic medications and monitor renal function.  Hypertension: Continue with metoprolol 50 mg daily, monitor blood pressure   Melena and acute blood loss anemia: Hemoglobin down but now stable on protonix gtt.  Patient is high risk given the recent PCI stenting and needs aspirin and Brilinta.  Discussed with cardiology, continue to trend and transfuse for hemoglobin less than 8.  GI consulted, will monitor if patient continues to drop her hemoglobin or shows signs of melena then will need EGD and bridging with cangrelor and heparin drip.  Type and screen, her H&H every 8.  Hemoglobin has been stable will plan to transition to p.o. Protonix twice daily.  COPD: As needed bronchodilators.  History of smoking  VTE prophylaxis: SCDs,   Advanced care planning:  Currently  full code, unsure if she wants to proceed with chemo or radiation.   Disposition: Unsafe discharge plans for patient to return home alone unsure of how much support family will be able to provide and if they truly understand she requires 24/7 care.  Daughter did call back and spoke with  plan for discharge to ECF pending pre-CERT. She was turned down at [facility] , so  is considering another facility , approved for [facility] .         .   Severe protein malnutrition  - encouraged her to take dietary supplements, and eat more protein rich food.       Subjective (past 24 hours):     Patient seen and examined at bedside, no reports of melena.  No acute overnight events.  No reports of shortness of breath.  No fever or chills and no bleeding issues.      NO CP or SOB with EKG changes. Was sitting in the chair .       Medications:  Reviewed    Infusion Medications    sodium chloride      sodium chloride       Scheduled Medications    metoprolol succinate  12.5 mg Oral Daily    busPIRone  5 mg Oral TID    pantoprazole  40 mg IntraVENous BID    ticagrelor  90 mg Oral BID    amiodarone  200 mg Oral Daily    tiotropium-olodaterol  2 puff Inhalation Daily    guaiFENesin  600 mg Oral BID    metoprolol succinate  50 mg Oral Daily    fluticasone  1 spray Each Nostril Daily    ALPRAZolam  0.25 mg Oral Once    sodium chloride flush  5-40 mL IntraVENous 2 times per day    aspirin  81 mg Oral Daily    atorvastatin  40 mg Oral Nightly     PRN Meds: hydrOXYzine pamoate, sodium chloride, benzonatate, guaiFENesin-dextromethorphan, phenol, albuterol, sodium phosphate 15 mmol in sodium chloride 0.9 % 250 mL IVPB, potassium chloride **OR** potassium alternative oral replacement **OR** potassium chloride, potassium chloride, nitroGLYCERIN, sodium chloride flush, sodium chloride, acetaminophen      Intake/Output Summary (Last 24 hours) at 7/4/2024 2123  Last data filed at 7/4/2024 1953  Gross per 24 hour   Intake 1110 ml

## 2024-07-05 LAB
BASOPHILS ABSOLUTE: 0 THOU/MM3 (ref 0–0.1)
BASOPHILS NFR BLD AUTO: 0.1 %
DEPRECATED RDW RBC AUTO: 57.4 FL (ref 35–45)
ECHO AV AREA PEAK VELOCITY: 1.2 CM2
ECHO AV AREA VTI: 1.3 CM2
ECHO AV AREA/BSA PEAK VELOCITY: 0.8 CM2/M2
ECHO AV AREA/BSA VTI: 0.9 CM2/M2
ECHO AV CUSP MM: 1.4 CM
ECHO AV MEAN GRADIENT: 9 MMHG
ECHO AV MEAN VELOCITY: 1.3 M/S
ECHO AV PEAK GRADIENT: 19 MMHG
ECHO AV PEAK VELOCITY: 2.2 M/S
ECHO AV VELOCITY RATIO: 0.45
ECHO AV VTI: 42.5 CM
ECHO BSA: 1.43 M2
ECHO EST RA PRESSURE: 10 MMHG
ECHO LA AREA 2C: 15.1 CM2
ECHO LA AREA 4C: 19.1 CM2
ECHO LA DIAMETER INDEX: 2.21 CM/M2
ECHO LA DIAMETER: 3.2 CM
ECHO LA MAJOR AXIS: 4.9 CM
ECHO LA MINOR AXIS: 4 CM
ECHO LA VOL BP: 56 ML (ref 22–52)
ECHO LA VOL MOD A2C: 46 ML (ref 22–52)
ECHO LA VOL MOD A4C: 56 ML (ref 22–52)
ECHO LA VOL/BSA BIPLANE: 39 ML/M2 (ref 16–34)
ECHO LA VOLUME INDEX MOD A2C: 32 ML/M2 (ref 16–34)
ECHO LA VOLUME INDEX MOD A4C: 39 ML/M2 (ref 16–34)
ECHO LV E' LATERAL VELOCITY: 7 CM/S
ECHO LV E' SEPTAL VELOCITY: 4 CM/S
ECHO LV EDV A2C: 58 ML
ECHO LV EDV A4C: 56 ML
ECHO LV EDV INDEX A4C: 39 ML/M2
ECHO LV EDV NDEX A2C: 40 ML/M2
ECHO LV EJECTION FRACTION A2C: 34 %
ECHO LV EJECTION FRACTION A4C: 45 %
ECHO LV EJECTION FRACTION BIPLANE: 43 % (ref 55–100)
ECHO LV ESV A2C: 39 ML
ECHO LV ESV A4C: 31 ML
ECHO LV ESV INDEX A2C: 27 ML/M2
ECHO LV ESV INDEX A4C: 21 ML/M2
ECHO LV FRACTIONAL SHORTENING: 21 % (ref 28–44)
ECHO LV INTERNAL DIMENSION DIASTOLE INDEX: 3.59 CM/M2
ECHO LV INTERNAL DIMENSION DIASTOLIC: 5.2 CM (ref 3.9–5.3)
ECHO LV INTERNAL DIMENSION SYSTOLIC INDEX: 2.83 CM/M2
ECHO LV INTERNAL DIMENSION SYSTOLIC: 4.1 CM
ECHO LV IVSD: 0.7 CM (ref 0.6–0.9)
ECHO LV MASS 2D: 122.8 G (ref 67–162)
ECHO LV MASS INDEX 2D: 84.7 G/M2 (ref 43–95)
ECHO LV POSTERIOR WALL DIASTOLIC: 0.7 CM (ref 0.6–0.9)
ECHO LV RELATIVE WALL THICKNESS RATIO: 0.27
ECHO LVOT AREA: 2.5 CM2
ECHO LVOT AV VTI INDEX: 0.51
ECHO LVOT DIAM: 1.8 CM
ECHO LVOT MEAN GRADIENT: 2 MMHG
ECHO LVOT PEAK GRADIENT: 4 MMHG
ECHO LVOT PEAK VELOCITY: 1 M/S
ECHO LVOT STROKE VOLUME INDEX: 37.7 ML/M2
ECHO LVOT SV: 54.7 ML
ECHO LVOT VTI: 21.5 CM
ECHO MV A VELOCITY: 0.75 M/S
ECHO MV E DECELERATION TIME (DT): 241 MS
ECHO MV E VELOCITY: 0.82 M/S
ECHO MV E/A RATIO: 1.09
ECHO MV E/E' LATERAL: 11.71
ECHO MV E/E' RATIO (AVERAGED): 16.11
ECHO MV E/E' SEPTAL: 20.5
ECHO MV REGURGITANT PEAK GRADIENT: 74 MMHG
ECHO MV REGURGITANT PEAK VELOCITY: 4.3 M/S
ECHO PV MAX VELOCITY: 0.6 M/S
ECHO PV PEAK GRADIENT: 1 MMHG
ECHO RIGHT VENTRICULAR SYSTOLIC PRESSURE (RVSP): 61 MMHG
ECHO RV INTERNAL DIMENSION: 1.9 CM
ECHO RV TAPSE: 1.6 CM (ref 1.7–?)
ECHO TV E WAVE: 0.5 M/S
ECHO TV REGURGITANT MAX VELOCITY: 3.56 M/S
ECHO TV REGURGITANT PEAK GRADIENT: 51 MMHG
EKG ATRIAL RATE: 68 BPM
EKG ATRIAL RATE: 70 BPM
EKG P AXIS: 44 DEGREES
EKG P AXIS: 75 DEGREES
EKG P-R INTERVAL: 126 MS
EKG P-R INTERVAL: 128 MS
EKG Q-T INTERVAL: 402 MS
EKG Q-T INTERVAL: 414 MS
EKG QRS DURATION: 88 MS
EKG QRS DURATION: 92 MS
EKG QTC CALCULATION (BAZETT): 434 MS
EKG QTC CALCULATION (BAZETT): 440 MS
EKG R AXIS: 11 DEGREES
EKG R AXIS: 16 DEGREES
EKG T AXIS: 58 DEGREES
EKG T AXIS: 71 DEGREES
EKG VENTRICULAR RATE: 68 BPM
EKG VENTRICULAR RATE: 70 BPM
EOSINOPHIL NFR BLD AUTO: 0.2 %
EOSINOPHILS ABSOLUTE: 0 THOU/MM3 (ref 0–0.4)
ERYTHROCYTE [DISTWIDTH] IN BLOOD BY AUTOMATED COUNT: 17.2 % (ref 11.5–14.5)
HCT VFR BLD AUTO: 32.1 % (ref 37–47)
HCT VFR BLD AUTO: 32.3 % (ref 37–47)
HGB BLD-MCNC: 10.2 GM/DL (ref 12–16)
HGB BLD-MCNC: 10.3 GM/DL (ref 12–16)
IMM GRANULOCYTES # BLD AUTO: 0.09 THOU/MM3 (ref 0–0.07)
IMM GRANULOCYTES NFR BLD AUTO: 0.8 %
LYMPHOCYTES ABSOLUTE: 0.6 THOU/MM3 (ref 1–4.8)
LYMPHOCYTES NFR BLD AUTO: 5.8 %
MCH RBC QN AUTO: 29.2 PG (ref 26–33)
MCHC RBC AUTO-ENTMCNC: 31.8 GM/DL (ref 32.2–35.5)
MCV RBC AUTO: 92 FL (ref 81–99)
MONOCYTES ABSOLUTE: 0.7 THOU/MM3 (ref 0.4–1.3)
MONOCYTES NFR BLD AUTO: 6.7 %
NEUTROPHILS ABSOLUTE: 9.6 THOU/MM3 (ref 1.8–7.7)
NEUTROPHILS NFR BLD AUTO: 86.4 %
NRBC BLD AUTO-RTO: 0 /100 WBC
PLATELET # BLD AUTO: 145 THOU/MM3 (ref 130–400)
PMV BLD AUTO: 11.7 FL (ref 9.4–12.4)
RBC # BLD AUTO: 3.49 MILL/MM3 (ref 4.2–5.4)
WBC # BLD AUTO: 11.1 THOU/MM3 (ref 4.8–10.8)

## 2024-07-05 PROCEDURE — 94640 AIRWAY INHALATION TREATMENT: CPT

## 2024-07-05 PROCEDURE — 85014 HEMATOCRIT: CPT

## 2024-07-05 PROCEDURE — 6370000000 HC RX 637 (ALT 250 FOR IP): Performed by: NURSE PRACTITIONER

## 2024-07-05 PROCEDURE — 99232 SBSQ HOSP IP/OBS MODERATE 35: CPT | Performed by: STUDENT IN AN ORGANIZED HEALTH CARE EDUCATION/TRAINING PROGRAM

## 2024-07-05 PROCEDURE — 2140000000 HC CCU INTERMEDIATE R&B

## 2024-07-05 PROCEDURE — 6370000000 HC RX 637 (ALT 250 FOR IP): Performed by: HOSPITALIST

## 2024-07-05 PROCEDURE — 94760 N-INVAS EAR/PLS OXIMETRY 1: CPT

## 2024-07-05 PROCEDURE — 93010 ELECTROCARDIOGRAM REPORT: CPT | Performed by: INTERNAL MEDICINE

## 2024-07-05 PROCEDURE — 85018 HEMOGLOBIN: CPT

## 2024-07-05 PROCEDURE — 93005 ELECTROCARDIOGRAM TRACING: CPT | Performed by: INTERNAL MEDICINE

## 2024-07-05 PROCEDURE — 93306 TTE W/DOPPLER COMPLETE: CPT | Performed by: INTERNAL MEDICINE

## 2024-07-05 PROCEDURE — 6370000000 HC RX 637 (ALT 250 FOR IP): Performed by: INTERNAL MEDICINE

## 2024-07-05 PROCEDURE — 36415 COLL VENOUS BLD VENIPUNCTURE: CPT

## 2024-07-05 PROCEDURE — 6370000000 HC RX 637 (ALT 250 FOR IP)

## 2024-07-05 PROCEDURE — 2580000003 HC RX 258: Performed by: INTERNAL MEDICINE

## 2024-07-05 PROCEDURE — 99233 SBSQ HOSP IP/OBS HIGH 50: CPT | Performed by: INTERNAL MEDICINE

## 2024-07-05 PROCEDURE — 2700000000 HC OXYGEN THERAPY PER DAY

## 2024-07-05 PROCEDURE — 85025 COMPLETE CBC W/AUTO DIFF WBC: CPT

## 2024-07-05 RX ORDER — ISOSORBIDE DINITRATE 10 MG/1
10 TABLET ORAL 2 TIMES DAILY
Status: DISCONTINUED | OUTPATIENT
Start: 2024-07-05 | End: 2024-07-06 | Stop reason: HOSPADM

## 2024-07-05 RX ORDER — METOPROLOL SUCCINATE 25 MG/1
25 TABLET, EXTENDED RELEASE ORAL DAILY
Status: DISCONTINUED | OUTPATIENT
Start: 2024-07-06 | End: 2024-07-06 | Stop reason: HOSPADM

## 2024-07-05 RX ORDER — LISINOPRIL 5 MG/1
5 TABLET ORAL DAILY
Status: DISCONTINUED | OUTPATIENT
Start: 2024-07-05 | End: 2024-07-05

## 2024-07-05 RX ORDER — LISINOPRIL 2.5 MG/1
2.5 TABLET ORAL DAILY
Status: DISCONTINUED | OUTPATIENT
Start: 2024-07-06 | End: 2024-07-06 | Stop reason: HOSPADM

## 2024-07-05 RX ORDER — PANTOPRAZOLE SODIUM 40 MG/1
40 TABLET, DELAYED RELEASE ORAL
Status: DISCONTINUED | OUTPATIENT
Start: 2024-07-05 | End: 2024-07-06 | Stop reason: HOSPADM

## 2024-07-05 RX ADMIN — TICAGRELOR 90 MG: 90 TABLET ORAL at 08:28

## 2024-07-05 RX ADMIN — FLUTICASONE PROPIONATE 1 SPRAY: 50 SPRAY, METERED NASAL at 08:30

## 2024-07-05 RX ADMIN — SODIUM CHLORIDE, PRESERVATIVE FREE 10 ML: 5 INJECTION INTRAVENOUS at 20:49

## 2024-07-05 RX ADMIN — ATORVASTATIN CALCIUM 40 MG: 40 TABLET, FILM COATED ORAL at 20:49

## 2024-07-05 RX ADMIN — PANTOPRAZOLE SODIUM 40 MG: 40 TABLET, DELAYED RELEASE ORAL at 08:28

## 2024-07-05 RX ADMIN — METOPROLOL SUCCINATE 50 MG: 50 TABLET, EXTENDED RELEASE ORAL at 08:29

## 2024-07-05 RX ADMIN — BUSPIRONE HYDROCHLORIDE 5 MG: 5 TABLET ORAL at 20:49

## 2024-07-05 RX ADMIN — GUAIFENESIN 600 MG: 600 TABLET, EXTENDED RELEASE ORAL at 20:49

## 2024-07-05 RX ADMIN — SODIUM CHLORIDE, PRESERVATIVE FREE 10 ML: 5 INJECTION INTRAVENOUS at 08:30

## 2024-07-05 RX ADMIN — GUAIFENESIN 600 MG: 600 TABLET, EXTENDED RELEASE ORAL at 08:29

## 2024-07-05 RX ADMIN — PANTOPRAZOLE SODIUM 40 MG: 40 TABLET, DELAYED RELEASE ORAL at 15:45

## 2024-07-05 RX ADMIN — TICAGRELOR 90 MG: 90 TABLET ORAL at 20:49

## 2024-07-05 RX ADMIN — ASPIRIN 81 MG 81 MG: 81 TABLET ORAL at 08:28

## 2024-07-05 RX ADMIN — TIOTROPIUM BROMIDE AND OLODATEROL 2 PUFF: 3.124; 2.736 SPRAY, METERED RESPIRATORY (INHALATION) at 07:39

## 2024-07-05 RX ADMIN — BUSPIRONE HYDROCHLORIDE 5 MG: 5 TABLET ORAL at 13:35

## 2024-07-05 RX ADMIN — AMIODARONE HYDROCHLORIDE 200 MG: 200 TABLET ORAL at 08:29

## 2024-07-05 RX ADMIN — BUSPIRONE HYDROCHLORIDE 5 MG: 5 TABLET ORAL at 08:30

## 2024-07-05 ASSESSMENT — PAIN SCALES - GENERAL: PAINLEVEL_OUTOF10: 0

## 2024-07-05 NOTE — PROCEDURES
PROCEDURE NOTE  Date: 7/5/2024   Name: Erin Hnana  YOB: 1945    Procedures  12 lead EKG completed. Results handed to Nica KLEIN.

## 2024-07-05 NOTE — PROGRESS NOTES
"  SUBJECTIVE:                                                    Nina Mancini is a 6 year old female who presents to clinic today for the following health issues:      Acute Illness   Acute illness concerns: headache, stomach ache, fever and vomiting and facial rash   Onset: yesterday     Fever: YES    Chills/Sweats: YES    Headache (location?): YES    Sinus Pressure:no    Conjunctivitis:  no    Ear Pain: no    Rhinorrhea: no    Congestion: no    Sore Throat: no     Cough: no    Wheeze: no    Decreased Appetite: YES    Nausea: YES    Vomiting: YES    Diarrhea:  no    Dysuria/Freq.: no    Fatigue/Achiness: YES    Sick/Strep Exposure: no     Therapies Tried and outcome: tylenol and ibuprofen     Nina started feeling sick yesterday. Got call from school that she had abdominal pain and headache.  She got a fever last evening and didn't want to eat dinner. Vomited several times overnight.  No diarrhea. Ate a little this morning.  Mom giving tylenol and ibuprofen. Tmax 102.5.  She has had strep throat frequently, doesn't always have typical symptoms. She also developed a rash on her face this morning.       Reviewed and updated as needed this visit by clinical staff  Tobacco  Allergies  Meds         ROS:  Constitutional, HEENT, cardiovascular, pulmonary, gi and gu systems are negative, except as otherwise noted.    OBJECTIVE:                                                    /60 (BP Location: Left arm, Patient Position: Chair, Cuff Size: Child)  Pulse 148  Temp 100.3  F (37.9  C) (Tympanic)  Ht 3' 9\" (1.143 m)  Wt 44 lb 9.6 oz (20.2 kg)  SpO2 100%  BMI 15.49 kg/m2  Body mass index is 15.49 kg/(m^2).      Gen: ill appearing but nontoxic, interactive girl, no distress  HEENT: PERRL, no conjunctival injection, oropharynx clear, + tonsillar erythema, MMM.  TM normal b/l.  Neck: supple, no LAD  Pulm: breathing comfortably, CTAB, no wheezes or rales  CV: tachycardic, regular, normal S1 and S2, no murmurs  Abd: " [facility]  OCCUPATIONAL THERAPY MISSED TREATMENT NOTE  [facility] CCU-STEPDOWN 3B  3B-25/025-A      Date: 2024  Patient Name: Erin Hanna        CSN: [id number]   : 1945  (79 y.o.)  Gender: female   Referring Practitioner: Gifty Castle APRN - CNP  Diagnosis: STEMI         REASON FOR MISSED TREATMENT:  Pt having a late lunch, will check back at a later date.                  BS present, soft, ND, mild periumbilical tenderness, no rebound or guarding   Ext: wwp, 2+ distal pulses, cap refill < 3 sec   Skin: scattered erythematous dots on face, no other rashes      Diagnostic Test Results:  Strep screen - Positive  CBC - wnl      ASSESSMENT/PLAN:                                                      Fever and vomiting - strep is positive, this may be underlying illness or colonization.  Discussed with mom that it is hard to tell in this case. Options include observe and treat as viral gastroenteritis versus treat for strep pharyngitis. Mom would like to treat for strep. Azithromycin 10mg/kg x 1 day, 5mg/kg x 4 days given PCN allergy. Continue supportive care.     Rash looks petechial, but.platelets are normal. Likely related to acute illness.     F/U as needed for persistent or worsening symptoms.           Martine Samaniego MD  [facility] [place] [facility]

## 2024-07-05 NOTE — PLAN OF CARE
Problem: Respiratory - Adult  Goal: Clear lung sounds  Outcome: Progressing  Note: Maintenance  Patient mutually agreed on goals.    Goal: Achieves optimal ventilation and oxygenation  7/4/2024 2302 by Deedee Nowak RN  Outcome: Progressing

## 2024-07-05 NOTE — DISCHARGE INSTR - COC
Continuity of Care Form    Patient Name: Erin Hanna   :  1945  MRN:  [id number]    Admit date:  2024  Discharge date:  2024    Code Status Order: Full Code   Advance Directives:     Admitting Physician:  Brock Fuller MD  PCP: Claudine Gutierrez APRN - CNP    Discharging Nurse: Kandi KLEIN  Discharging Hospital Unit/Room#: 3B-25/025-A  Discharging Unit Phone Number: [phone]    Emergency Contact:   Extended Emergency Contact Information  Primary Emergency Contact: Kari Delgado   [facility]  Home Phone: [phone]  Mobile Phone: [phone]  Relation: Child    Past Surgical History:  Past Surgical History:   Procedure Laterality Date    BRONCHOSCOPY N/A 2024    BRONCHOSCOPY ENDOBRONCHIAL ULTRASOUND performed by Tone Lugo DO at [facility] ENDOSCOPY    CARDIAC PROCEDURE N/A 2024    Coronary angiography performed by Brock Fuller MD at [facility] CARDIAC CATH LAB    CARDIAC PROCEDURE N/A 2024    Percutaneous coronary intervention performed by Brock Fuller MD at [facility] CARDIAC CATH LAB    CARDIAC PROCEDURE N/A 2024    Optical coherence tomagraphy performed by Brock Fuller MD at [facility] CARDIAC CATH LAB    CHOLECYSTECTOMY      COLONOSCOPY      TUBAL LIGATION         Immunization History:     There is no immunization history on file for this patient.    Active Problems:  Patient Active Problem List   Diagnosis Code    Anxiety disorder F41.9    Chronic obstructive pulmonary disease (HCC) J44.9    Essential hypertension I10    STEMI (ST elevation myocardial infarction) ([facility]) I21.3    Severe malnutrition (HCC) E43    Lung mass R91.8    Tobacco smoke exposure Z77.22    Mediastinal lymphadenopathy R59.0    Lung collapse J98.19    Primary NSCLC ([facility]) C34.90       Isolation/Infection:   Isolation            No Isolation          Patient Infection Status       None to display            Nurse Assessment:  Last Vital Signs: /66   Pulse 68   Temp 97.6 °F

## 2024-07-05 NOTE — CARE COORDINATION
7/5/24, 11:03 AM EDT    DISCHARGE PLANNING EVALUATION    Todd from [facility] called, precert approved.  Left Todd message, no discharge today due to cardiac issues, possible weekend discharge.  Requested return call with precert expiration date.     Todd sent message, precert good thru Sun 7/7.  Hospitalist hoping for discharge  Sunday.    Spoke with patient daughter Kari, informed precert is approved,  Hoping for discharge Sunday 7/7.  Kari will transport.

## 2024-07-05 NOTE — PROCEDURES
PROCEDURE NOTE  Date: 7/5/2024   Name: Erin Hanna  YOB: 1945    Procedures  12 lead EKG completed. Results handed to Nica KLEIN.

## 2024-07-05 NOTE — CARE COORDINATION
7/5/24, 2:45 PM EDT    DISCHARGE ON GOING EVALUATION    Erin Hanna       [facility] day: 18  Location: -[facility] Reason for admit: STEMI (ST elevation myocardial infarction) (HCC) [I21.3]  ST elevation myocardial infarction (STEMI), unspecified artery (HCC) [I21.3]     Procedures:   6/17 [facility] with Dr. Fuller: Successful PCI of RCA STEMI using OCT guidance followed by 3 overlapping SANTA.   6/18 Echo: EF 40-45%. Mild global hypokinesis of left ventricle. Aortic valve: Moderately thickened and calcified cusp. Mitral valve: Moderately thickened and calcified leaflet, at the posterior leaflet. Left atrium: severely dilated. Right atrium: moderately dilated.   6/24 EBUS with Dr. Lugo: Endobronchial ultrasound with biopsy of mass and lymph nodes (3 nodes) and 1 mass.   Station 4L 1.5 cm - tbna x 3 - slick negative  Station 7 2 cm - tbna x 3 - slick lymphocytes  Station 4R - small < 1cm - no biopsy  Station 10 R - large 2.5 cm - SLICK: positive non-small  Right lower lobe mass large > 4cm - SLICK: positive non-small.    Imaging since last note: none    Barriers to Discharge: Hospitalist and cardio following. Planning 2 units of blood today. PT/OT. Protonix. Amio, ASA. Brilinta.     PCP: Claudine Gutierrez APRN - CNP  Readmission Risk Score: 11.9    Patient Goals/Plan/Treatment Preferences: Planning [facility]. Hopeful for Sunday. SW following.

## 2024-07-05 NOTE — PLAN OF CARE
Problem: Discharge Planning  Goal: Discharge to home or other facility with appropriate resources  Outcome: Progressing     Problem: ABCDS Injury Assessment  Goal: Absence of physical injury  Outcome: Progressing     Problem: Safety - Adult  Goal: Free from fall injury  Outcome: Progressing     Problem: Respiratory - Adult  Goal: Achieves optimal ventilation and oxygenation  Outcome: Progressing     Problem: Cardiovascular - Adult  Goal: Maintains optimal cardiac output and hemodynamic stability  Outcome: Progressing     Problem: Cardiovascular - Adult  Goal: Absence of cardiac dysrhythmias or at baseline  Outcome: Progressing     Problem: Skin/Tissue Integrity - Adult  Goal: Skin integrity remains intact  Outcome: Progressing     Problem: Pain  Goal: Verbalizes/displays adequate comfort level or baseline comfort level  Outcome: Progressing     Problem: Skin/Tissue Integrity  Goal: Absence of new skin breakdown  Outcome: Progressing     Problem: Chronic Conditions and Co-morbidities  Goal: Patient's chronic conditions and co-morbidity symptoms are monitored and maintained or improved  Outcome: Progressing     Problem: Nutrition Deficit:  Goal: Optimize nutritional status  Outcome: Progressing

## 2024-07-05 NOTE — PALLIATIVE CARE
Follow Up / Progress Note        Patient:   Erin Hanna  YOB: 1945  Age:  79 y.o.  Room:  [address] [place] [address]  MRN:  [id number]         Chart reviewed. Please contact palliative care if needs arise.          Electronically signed by Susanna Vidales RN on 7/5/2024 at 9:02 AM             Palliative Care Office: [phone]

## 2024-07-05 NOTE — PROGRESS NOTES
Hospitalist Progress Note    Patient:  Erin Hanna      Unit/Bed:3B-25/025-A    YOB: 1945    MRN: [id number]       Acct: [id number]     PCP: Claudine Gutierrez APRN - CNP    Date of Admission: 6/17/2024    Assessment/Plan:    STEMI: Patient status post PCI with SANTA to RCA x 3.  Continue with Brilinta and aspirin.  Appreciate cardiology input.  Continue with metoprolol 50 mg daily, atorvastatin 40 mg daily  Ischemic cardiomyopathy: Ejection fraction 40 to 45%.  Does not appear to be volume overloaded during acute CHF exacerbation.  GDMT.  Today with abnormal EKG -discussed with cardiology , no plans for intervention as pt is asymptomatic., so will optimize med Rx wth low dose ACE and Nitrates and titrate them as tolerated.   Right lower lobe mass with collapse: Patient status post EBUS 6/24/2024.  Biopsy is positive for non-small cell carcinoma.  Discussed with pulmonology and radiation oncology.  Continue with supplemental O2.  Aggressive pulmonary toiletry.  Patient unsure if she wants to proceed with chemo and radiation.    MSSA pneumonia: post obstructive pneumonia. Completed antibiotics, so far cultures only growing out MSSA but given right lung collapse  CKD stage III: Avoid nephrotoxic medications and monitor renal function.  Hypertension: Continue with metoprolol 50 mg daily, monitor blood pressure   Melena and acute blood loss anemia: Hemoglobin down but now stable on protonix gtt.  Patient is high risk given the recent PCI stenting and needs aspirin and Brilinta.  Discussed with cardiology, continue to trend and transfuse for hemoglobin less than 8.  GI consulted, will monitor if patient continues to drop her hemoglobin or shows signs of melena then will need EGD and bridging with cangrelor and heparin drip.  Type and screen, her H&H every 8.  Hemoglobin has been stable will plan to transition to p.o. Protonix twice daily.  COPD: As needed bronchodilators.  History of smoking  VTE

## 2024-07-05 NOTE — PROGRESS NOTES
Cardiology Progress Note      Patient:  Erin Hanna  YOB: 1945  MRN: [id number]   Acct: [id number]  Admit Date:  6/17/2024  Primary Cardiologist:  none  Seen by Dr. Fuller      Per prior cardiology consult note-  CHIEF COMPLAINT: FERRARI        HPI: This is a pleasant 78 y.o. female with hx of HTN who presents with progressive dyspnea and sob.  Has been having severe sob for the last couple days.  Tonight, she awoke with sudden onset of sob.  No chest pain, arm pain, jaw pain, or palpitations.  Daughter brought her to the ED.  ECG show inferolateral ST-elevations but there is also concern for anterior elevations.  She has no f/c/ns.  No sick contacts.  No prior autoimmune diseases.  ED did POCUS, no obvious fluid, ED preserved.  Cr 1.2.  Hgb 13.  She is not having significant discomfort now, however ST elevation persists.     Subjective (Events in last 24 hours):   Pt awake, alert. NAD. Denies cp or sob. Feeling well. Planning [facility] soon. Precert approved. Discussed with her about the EKG changes. She has no symptoms currently.     Occ NSVT, SVT runs, short in duration.   Cocnern yesterday for JORDYN, serial EKG reviewed by Dr Fuller. Would not recommend any further intervention at this time unless becomes symptomatic or develops CHF symptoms.   Objective:   /66   Pulse 68   Temp 97.6 °F (36.4 °C) (Oral)   Resp 20   Ht 1.6 m (5' 3\")   Wt 46.1 kg (101 lb 11.2 oz)   SpO2 95%   BMI 18.02 kg/m²      vss  TELEMETRY: sr     Physical Exam:  General Appearance: alert and oriented to person, place and time, in no acute distress  Cardiovascular: normal rate, regular rhythm, normal S1 and S2, no murmurs, rubs, clicks, or gallops, distal pulses intact, no carotid bruits, no JVD  Pulmonary/Chest: clear to auscultation bilaterally- no wheezes, rales or rhonchi, normal air movement, no respiratory distress  Abdomen: soft, non-tender, non-distended, normal bowel sounds, no masses   Extremities: no  successful. The guidewire crossed the lesion. Device was deployed. The pre-interventional distal flow is decreased (MINA 1). Post-intervention MINA flow is 3. There were no complications.   There is a 0% residual stenosis post intervention.      Mid RCA to Dist RCA lesion   Angioplasty   Angioplasty was performed prior to stent deployment.   Supplies used: CATH BLLN ANGIO 2X12MM SC EUPHORA RX   Angioplasty   Angioplasty was performed prior to stent deployment.   Supplies used: CATH BLLN ANGIO 2X12MM SC EUPHORA RX   Angioplasty   Angioplasty was performed prior to stent deployment.   Angioplasty   Angioplasty was performed prior to stent deployment.   Stent   A single stent was placed. Drug-eluting stent was successfully placed.   Supplies used: STENT CORONARY SHANNON FRONTIER RX 4X38 MM ZOTAROLIMUS ELUTE   Angioplasty   Angioplasty was performed following stent deployment.   Supplies used: STENT CORONARY SHANNON FRONTIER RX 4X38 MM ZOTAROLIMUS ELUTE   Stent   A single stent was placed. Drug-eluting stent was successfully placed.   Supplies used: STENT CORONARY SHANNON FRONTIER RX 4X38 MM ZOTAROLIMUS ELUTE   Angioplasty   Angioplasty was performed following stent deployment.   Angioplasty   Angioplasty was performed following stent deployment.   Supplies used: [employer] NC EUPHORA 4X20mm Balloon   Post-Intervention Lesion Assessment   The intervention was successful. The guidewire crossed the lesion. Device was deployed. The pre-interventional distal flow is decreased (MINA 1). Post-intervention MINA flow is 3. There were no complications.   There is a 0% residual stenosis post intervention.         Left Heart     Left Ventricle The left ventricular size is normal. There is mild left ventricular systolic dysfunction. LV systolic pressure is normal. The estimated EF = 45 - 50%.      Coronary Diagram     Diagnostic  Dominance: Right    Intervention         Lab Data:    Cardiac Enzymes:  No results for input(s): \"CKTOTAL\",

## 2024-07-05 NOTE — PROGRESS NOTES
[facility] Adult Intravenous to Oral Protocol    Protonix changed to PO per [facility] P&T IV to PO protocol.    Patient meets criteria based on the following:  Tolerating diet more advanced than clear liquids: Yes  Tolerating other oral medications: Yes  Not on vasopressors: Yes  No nausea/vomiting within past 24 hours: Yes  No active GI bleed:  Yes  No gastrectomy, gastric outlet or bowel obstruction, ileus, malabsorption syndrome: Yes  No seizures for 72 hours (antiepileptics only): Yes      Thank you,  Eddie Anaya [facility] 7/5/2024 8:14 AM

## 2024-07-06 VITALS
DIASTOLIC BLOOD PRESSURE: 55 MMHG | BODY MASS INDEX: 18.62 KG/M2 | TEMPERATURE: 98.5 F | WEIGHT: 105.1 LBS | HEART RATE: 66 BPM | HEIGHT: 63 IN | SYSTOLIC BLOOD PRESSURE: 101 MMHG | OXYGEN SATURATION: 93 % | RESPIRATION RATE: 18 BRPM

## 2024-07-06 LAB
ANION GAP SERPL CALC-SCNC: 8 MEQ/L (ref 8–16)
BASOPHILS ABSOLUTE: 0 THOU/MM3 (ref 0–0.1)
BASOPHILS NFR BLD AUTO: 0.1 %
BUN SERPL-MCNC: 19 MG/DL (ref 7–22)
CALCIUM SERPL-MCNC: 8 MG/DL (ref 8.5–10.5)
CHLORIDE SERPL-SCNC: 106 MEQ/L (ref 98–111)
CO2 SERPL-SCNC: 26 MEQ/L (ref 23–33)
CREAT SERPL-MCNC: 0.6 MG/DL (ref 0.4–1.2)
DEPRECATED RDW RBC AUTO: 55.6 FL (ref 35–45)
EOSINOPHIL NFR BLD AUTO: 0.3 %
EOSINOPHILS ABSOLUTE: 0 THOU/MM3 (ref 0–0.4)
ERYTHROCYTE [DISTWIDTH] IN BLOOD BY AUTOMATED COUNT: 17.1 % (ref 11.5–14.5)
GFR SERPL CREATININE-BSD FRML MDRD: > 90 ML/MIN/1.73M2
GLUCOSE SERPL-MCNC: 113 MG/DL (ref 70–108)
HCT VFR BLD AUTO: 31.4 % (ref 37–47)
HGB BLD-MCNC: 10.1 GM/DL (ref 12–16)
IMM GRANULOCYTES # BLD AUTO: 0.02 THOU/MM3 (ref 0–0.07)
IMM GRANULOCYTES NFR BLD AUTO: 0.2 %
LYMPHOCYTES ABSOLUTE: 0.5 THOU/MM3 (ref 1–4.8)
LYMPHOCYTES NFR BLD AUTO: 5 %
MCH RBC QN AUTO: 29.1 PG (ref 26–33)
MCHC RBC AUTO-ENTMCNC: 32.2 GM/DL (ref 32.2–35.5)
MCV RBC AUTO: 90.5 FL (ref 81–99)
MONOCYTES ABSOLUTE: 0.6 THOU/MM3 (ref 0.4–1.3)
MONOCYTES NFR BLD AUTO: 6.2 %
NEUTROPHILS ABSOLUTE: 8.6 THOU/MM3 (ref 1.8–7.7)
NEUTROPHILS NFR BLD AUTO: 88.2 %
NRBC BLD AUTO-RTO: 0 /100 WBC
PLATELET # BLD AUTO: 153 THOU/MM3 (ref 130–400)
PMV BLD AUTO: 11.7 FL (ref 9.4–12.4)
POTASSIUM SERPL-SCNC: 3.7 MEQ/L (ref 3.5–5.2)
RBC # BLD AUTO: 3.47 MILL/MM3 (ref 4.2–5.4)
SODIUM SERPL-SCNC: 140 MEQ/L (ref 135–145)
WBC # BLD AUTO: 9.8 THOU/MM3 (ref 4.8–10.8)

## 2024-07-06 PROCEDURE — 6370000000 HC RX 637 (ALT 250 FOR IP): Performed by: INTERNAL MEDICINE

## 2024-07-06 PROCEDURE — 94640 AIRWAY INHALATION TREATMENT: CPT

## 2024-07-06 PROCEDURE — 80048 BASIC METABOLIC PNL TOTAL CA: CPT

## 2024-07-06 PROCEDURE — 6370000000 HC RX 637 (ALT 250 FOR IP): Performed by: HOSPITALIST

## 2024-07-06 PROCEDURE — 2700000000 HC OXYGEN THERAPY PER DAY

## 2024-07-06 PROCEDURE — 6370000000 HC RX 637 (ALT 250 FOR IP): Performed by: NURSE PRACTITIONER

## 2024-07-06 PROCEDURE — 2580000003 HC RX 258: Performed by: INTERNAL MEDICINE

## 2024-07-06 PROCEDURE — 85025 COMPLETE CBC W/AUTO DIFF WBC: CPT

## 2024-07-06 PROCEDURE — 99239 HOSP IP/OBS DSCHRG MGMT >30: CPT | Performed by: INTERNAL MEDICINE

## 2024-07-06 PROCEDURE — 36415 COLL VENOUS BLD VENIPUNCTURE: CPT

## 2024-07-06 PROCEDURE — 94761 N-INVAS EAR/PLS OXIMETRY MLT: CPT

## 2024-07-06 RX ORDER — LISINOPRIL 2.5 MG/1
2.5 TABLET ORAL DAILY
Qty: 30 TABLET | Refills: 3 | DISCHARGE
Start: 2024-07-07

## 2024-07-06 RX ORDER — NITROGLYCERIN 0.4 MG/1
0.4 TABLET SUBLINGUAL EVERY 5 MIN PRN
Qty: 25 TABLET | Refills: 3 | DISCHARGE
Start: 2024-07-06

## 2024-07-06 RX ORDER — BENZONATATE 200 MG/1
200 CAPSULE ORAL 3 TIMES DAILY PRN
DISCHARGE
Start: 2024-07-06 | End: 2024-07-13

## 2024-07-06 RX ORDER — BUSPIRONE HYDROCHLORIDE 5 MG/1
5 TABLET ORAL 3 TIMES DAILY
DISCHARGE
Start: 2024-07-06

## 2024-07-06 RX ORDER — AMIODARONE HYDROCHLORIDE 200 MG/1
200 TABLET ORAL DAILY
DISCHARGE
Start: 2024-07-07

## 2024-07-06 RX ORDER — ISOSORBIDE DINITRATE 5 MG/1
5 TABLET ORAL 2 TIMES DAILY
Qty: 60 TABLET | Refills: 2 | DISCHARGE
Start: 2024-07-06

## 2024-07-06 RX ORDER — FLUTICASONE PROPIONATE 50 MCG
1 SPRAY, SUSPENSION (ML) NASAL DAILY
Qty: 16 G | Refills: 3 | DISCHARGE
Start: 2024-07-07

## 2024-07-06 RX ORDER — ATORVASTATIN CALCIUM 40 MG/1
40 TABLET, FILM COATED ORAL NIGHTLY
Qty: 30 TABLET | Refills: 3 | DISCHARGE
Start: 2024-07-06

## 2024-07-06 RX ORDER — METOPROLOL SUCCINATE 25 MG/1
25 TABLET, EXTENDED RELEASE ORAL DAILY
Qty: 30 TABLET | Refills: 3 | DISCHARGE
Start: 2024-07-07

## 2024-07-06 RX ORDER — PANTOPRAZOLE SODIUM 40 MG/1
40 TABLET, DELAYED RELEASE ORAL
Qty: 30 TABLET | Refills: 3 | DISCHARGE
Start: 2024-07-06

## 2024-07-06 RX ADMIN — BUSPIRONE HYDROCHLORIDE 5 MG: 5 TABLET ORAL at 14:06

## 2024-07-06 RX ADMIN — FLUTICASONE PROPIONATE 1 SPRAY: 50 SPRAY, METERED NASAL at 09:47

## 2024-07-06 RX ADMIN — TICAGRELOR 90 MG: 90 TABLET ORAL at 09:41

## 2024-07-06 RX ADMIN — PANTOPRAZOLE SODIUM 40 MG: 40 TABLET, DELAYED RELEASE ORAL at 09:41

## 2024-07-06 RX ADMIN — SODIUM CHLORIDE, PRESERVATIVE FREE 10 ML: 5 INJECTION INTRAVENOUS at 09:41

## 2024-07-06 RX ADMIN — AMIODARONE HYDROCHLORIDE 200 MG: 200 TABLET ORAL at 09:41

## 2024-07-06 RX ADMIN — BUSPIRONE HYDROCHLORIDE 5 MG: 5 TABLET ORAL at 09:41

## 2024-07-06 RX ADMIN — GUAIFENESIN 600 MG: 600 TABLET, EXTENDED RELEASE ORAL at 09:41

## 2024-07-06 RX ADMIN — ASPIRIN 81 MG 81 MG: 81 TABLET ORAL at 09:41

## 2024-07-06 RX ADMIN — METOPROLOL SUCCINATE 25 MG: 25 TABLET, FILM COATED, EXTENDED RELEASE ORAL at 09:41

## 2024-07-06 RX ADMIN — TIOTROPIUM BROMIDE AND OLODATEROL 2 PUFF: 3.124; 2.736 SPRAY, METERED RESPIRATORY (INHALATION) at 08:06

## 2024-07-06 RX ADMIN — PANTOPRAZOLE SODIUM 40 MG: 40 TABLET, DELAYED RELEASE ORAL at 14:32

## 2024-07-06 NOTE — PLAN OF CARE
Problem: Discharge Planning  Goal: Discharge to home or other facility with appropriate resources  7/6/2024 1107 by Kandi Cordova RN  Outcome: Progressing  Flowsheets (Taken 7/6/2024 0142 by Teresa Shirley RN)  Discharge to home or other facility with appropriate resources:   Identify barriers to discharge with patient and caregiver   Arrange for needed discharge resources and transportation as appropriate  Note: [place]     Problem: ABCDS Injury Assessment  Goal: Absence of physical injury  7/6/2024 1107 by Kandi Cordova RN  Outcome: Progressing  Flowsheets (Taken 7/6/2024 0142 by Teresa Shirley RN)  Absence of Physical Injury: Implement safety measures based on patient assessment     Problem: Safety - Adult  Goal: Free from fall injury  7/6/2024 1107 by Kandi Cordova RN  Outcome: Progressing  Flowsheets (Taken 7/6/2024 0142 by Teresa Shirley RN)  Free From Fall Injury: Instruct family/caregiver on patient safety  Note: Bed locked & in low position, call light in reach, side-rails up x2, bed/chair alarm utilized, non-slip socks on when ambulating, reminded patient to use call light to call for assistance.      Problem: Respiratory - Adult  Goal: Achieves optimal ventilation and oxygenation  7/6/2024 1107 by Kandi Cordova RN  Outcome: Progressing  Flowsheets (Taken 7/6/2024 0142 by Teresa Shirley RN)  Achieves optimal ventilation and oxygenation:   Assess for changes in respiratory status   Assess for changes in mentation and behavior     Problem: Cardiovascular - Adult  Goal: Maintains optimal cardiac output and hemodynamic stability  7/6/2024 1107 by Kandi Cordova RN  Outcome: Progressing  Flowsheets (Taken 7/6/2024 0142 by Teresa Shirley RN)  Maintains optimal cardiac output and hemodynamic stability:   Monitor blood pressure and heart rate   Assess for signs of decreased cardiac output     Problem: Skin/Tissue Integrity - Adult  Goal: Skin integrity remains intact  7/6/2024

## 2024-07-06 NOTE — PLAN OF CARE
Problem: Discharge Planning  Goal: Discharge to home or other facility with appropriate resources  Outcome: Progressing  Flowsheets (Taken 7/6/2024 0142)  Discharge to home or other facility with appropriate resources:   Identify barriers to discharge with patient and caregiver   Arrange for needed discharge resources and transportation as appropriate     Problem: ABCDS Injury Assessment  Goal: Absence of physical injury  Outcome: Progressing  Flowsheets (Taken 7/6/2024 0142)  Absence of Physical Injury: Implement safety measures based on patient assessment     Problem: Safety - Adult  Goal: Free from fall injury  Outcome: Progressing  Flowsheets (Taken 7/6/2024 0142)  Free From Fall Injury: Instruct family/caregiver on patient safety  Note: Bed locked & in low position, call light in reach, side-rails up x2, bed/chair alarm utilized, non-slip socks on when ambulating, reminded patient to use call light to call for assistance.       Problem: Respiratory - Adult  Goal: Achieves optimal ventilation and oxygenation  Outcome: Progressing  Flowsheets (Taken 7/6/2024 0142)  Achieves optimal ventilation and oxygenation:   Assess for changes in respiratory status   Assess for changes in mentation and behavior     Problem: Cardiovascular - Adult  Goal: Maintains optimal cardiac output and hemodynamic stability  Outcome: Progressing  Flowsheets (Taken 7/6/2024 0142)  Maintains optimal cardiac output and hemodynamic stability:   Monitor blood pressure and heart rate   Assess for signs of decreased cardiac output     Problem: Cardiovascular - Adult  Goal: Absence of cardiac dysrhythmias or at baseline  Outcome: Progressing     Problem: Skin/Tissue Integrity - Adult  Goal: Skin integrity remains intact  Outcome: Progressing     Problem: Pain  Goal: Verbalizes/displays adequate comfort level or baseline comfort level  Outcome: Progressing     Problem: Skin/Tissue Integrity  Goal: Absence of new skin breakdown  Description: 1.

## 2024-07-06 NOTE — PROGRESS NOTES
Discussed discharge summary with patient. Instructed patient about medications & follow up appointments. Patient denies any additional questions. Patient was discharged with all belongings. No distress noted. Patient discharged to home. Taken down to the vehicle by RN per wheelchair. Blue packet handed to patients daughter.

## 2024-07-06 NOTE — PLAN OF CARE
Problem: Respiratory - Adult  Goal: Clear lung sounds  Outcome: Progressing     Problem: Respiratory - Adult  Goal: Achieves optimal ventilation and oxygenation  7/6/2024 1351 by Korin Munson RCP  Outcome: Progressing

## 2024-07-09 ENCOUNTER — CLINICAL DOCUMENTATION (OUTPATIENT)
Dept: CASE MANAGEMENT | Age: 79
End: 2024-07-09

## 2024-07-09 ENCOUNTER — HOSPITAL ENCOUNTER (OUTPATIENT)
Dept: INFUSION THERAPY | Age: 79
Discharge: HOME | End: 2024-07-09
Payer: MEDICARE

## 2024-07-09 ENCOUNTER — OFFICE VISIT (OUTPATIENT)
Dept: ONCOLOGY | Age: 79
End: 2024-07-09
Payer: MEDICARE

## 2024-07-09 VITALS
DIASTOLIC BLOOD PRESSURE: 53 MMHG | SYSTOLIC BLOOD PRESSURE: 98 MMHG | HEIGHT: 63 IN | WEIGHT: 103.4 LBS | BODY MASS INDEX: 18.32 KG/M2 | TEMPERATURE: 98.4 F | RESPIRATION RATE: 18 BRPM | HEART RATE: 66 BPM

## 2024-07-09 VITALS
SYSTOLIC BLOOD PRESSURE: 98 MMHG | HEART RATE: 66 BPM | DIASTOLIC BLOOD PRESSURE: 53 MMHG | TEMPERATURE: 98.4 F | RESPIRATION RATE: 18 BRPM

## 2024-07-09 DIAGNOSIS — C34.90 PRIMARY NSCLC (HCC): Primary | ICD-10-CM

## 2024-07-09 PROCEDURE — 1123F ACP DISCUSS/DSCN MKR DOCD: CPT | Performed by: INTERNAL MEDICINE

## 2024-07-09 PROCEDURE — 99211 OFF/OP EST MAY X REQ PHY/QHP: CPT

## 2024-07-09 PROCEDURE — 3078F DIAST BP <80 MM HG: CPT | Performed by: INTERNAL MEDICINE

## 2024-07-09 PROCEDURE — 99204 OFFICE O/P NEW MOD 45 MIN: CPT | Performed by: INTERNAL MEDICINE

## 2024-07-09 PROCEDURE — 3074F SYST BP LT 130 MM HG: CPT | Performed by: INTERNAL MEDICINE

## 2024-07-09 ASSESSMENT — ENCOUNTER SYMPTOMS
ALLERGIC/IMMUNOLOGIC NEGATIVE: 1
GASTROINTESTINAL NEGATIVE: 1
EYES NEGATIVE: 1
RESPIRATORY NEGATIVE: 1

## 2024-07-09 NOTE — PROGRESS NOTES
PM    CT CHEST W CONTRAST    Result Date: 6/27/2024  1. A right infrahilar right lower lobe mass measures at least 5.0 x 7.0 cm. The surrounding consolidation in the right lower lobe and the small right pleural effusion partially obscures visualization. Mediastinal, hilar, and subcarinal lymphadenopathy is described. 2. No metastatic disease is visualized within the abdomen/pelvis. Cirrhotic liver morphology is suspected. Correlate with liver function tests. 3. Chronic findings are discussed. **This report has been created using voice recognition software.  It may contain minor errors which are inherent in voice recognition technology.** Electronically signed by Dr. Jazmine Lazo    CT ABDOMEN PELVIS W IV CONTRAST Additional Contrast? Radiologist Recommendation    Result Date: 6/27/2024  1. A right infrahilar right lower lobe mass measures at least 5.0 x 7.0 cm. The surrounding consolidation in the right lower lobe and the small right pleural effusion partially obscures visualization. Mediastinal, hilar, and subcarinal lymphadenopathy is described. 2. No metastatic disease is visualized within the abdomen/pelvis. Cirrhotic liver morphology is suspected. Correlate with liver function tests. 3. Chronic findings are discussed. **This report has been created using voice recognition software.  It may contain minor errors which are inherent in voice recognition technology.** Electronically signed by Dr. Jazmine Lazo    XR CHEST PORTABLE    Result Date: 6/26/2024  Impression: 1. Interval collapse of right lower lobe likely due to large neoplasm obstructing the right lower lobe bronchus. 2. COPD. 3. Cardiomegaly with pulmonary hypertension. This document has been electronically signed by: Jonah Ambrose III, MD on 06/26/2024 03:10 AM    CT CHEST WO CONTRAST    Result Date: 6/21/2024  1. 8.3 x 5.6 x 6 cm mass in the right lower lobe. 2. Diffuse COPD and thickening of the interstitial lung markings. 3. Mild cardiomegaly.

## 2024-07-09 NOTE — PATIENT INSTRUCTIONS
Referrla for follow up with rad onc( they saw her once in hospital). Return to this clinic in two weeks to reassess performance status( currently in rehab may improve).

## 2024-07-10 NOTE — DISCHARGE SUMMARY
Hospitalist Discharge Summary        Patient: Erin Hanna  YOB: 1945  MRN: [id number]   Acct: [id number]    Primary Care Physician: Claudine Gutierrez APRN - CNP    Admit date  6/17/2024    Discharge date:  7/6/24    Chief Complaint on presentation :-Shortness of breath    Discharge Assessment and Plan:-     Lung cancer (squamous cell )  diagnosed on this visit, patient had EBUS done on 6/17/2024 by Dr. Weber subsequent pathology revealed malignancy on the FNA, was seen by oncology team.  They recommended outpatient follow-up as the patient has severe physical deconditioning, and has generalized weakness    Coronary disease with STEMI post PCI with drug-eluting stent to RCA x 3    Chronic kidney disease stage III    Primary hypertension    Protein malnutrition malnutrition, severe    Mildly decreased ejection fraction 40 to 45%    COPD with history of significant tobacco usage    Patient with pneumonia was on IV antibiotics for a prolonged   Time and patient did have staff aureus noted in the pneumonia panel as its MSSA vancomycin was not given but eventually changed to Augmentin    History of coronary artery disease    Severe deconditioning    Initial H and P and Hospital course:-  89-year-old female with history of hypertension and CKD 3A presenting with worsening shortness of breath.  This been going on for the past few days.  Woke up very early this morning with severe shortness of breath.  Denied any chest pain/arm pain/jaw pain at that time.  Denied palpitations.  Denied nausea or vomiting.  Was brought to the ED, EKG showed inferolateral ST elevations, elevated troponin at 136.  No prior history of stents.  Interventional cardiology consulted, patient taken for emergent cath, PCI w/ SANTA x 3 to RCA.  Improved repeat EKG.  Patient sent to ICU for close monitoring post cath.  Started on DAPT of aspirin and Brilinta per cardiology.       Physical Exam:-  Vitals:   No data

## 2024-07-11 NOTE — PROGRESS NOTES
Name: Erin Hanna  : 1945  MRN: [id number]    Oncology Navigation- Initial Note:    Intake-  Contact Type: Medical Oncology    Diagnosis: Thoracic- malignant  Not surgical candidate    Home Disposition: Lives in Assisted Living Facility-   [facility]-[phone]  Once discharged return to [facility] with daughter    Patient needs and barriers to care: Coordination of Care, Knowledge deficit, Symptom Management, and Transportation     Referral Source: Outpatient    Receptive to Advanced Care Planning/ Palliative Care:  deferred    Interventions-              Oncologist Plan of Care: referral back to Dr. Goss regarding                                                           Radiation tx Not sure if patient will be able to                                                           tolerate chemotherapy. Need to assess her after                                                             rehabilitation.              Plan of Care Reviewed: yes - kavitha reiterated                General Interventions: navigation program discussed;welcome folder reviewed & given,including contact information                  Kavitha contacted [facility] regarding referring back to radiation.     Continuum of Care: Diagnosis/Active Treatment    Notes: kavitha following to assist & support as needed    Electronically signed by Dede Oliveros RN on 2024 at 1:20 PM

## 2024-07-22 ENCOUNTER — TELEPHONE (OUTPATIENT)
Dept: CARDIOLOGY CLINIC | Age: 79
End: 2024-07-22

## 2024-07-22 NOTE — TELEPHONE ENCOUNTER
Placed call to number listed in message below.  No answer, LM for patient to return call.  Mobile number listed in chart was incorrect, removed from profile.

## 2024-07-22 NOTE — TELEPHONE ENCOUNTER
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

## 2024-07-24 ENCOUNTER — CLINICAL DOCUMENTATION (OUTPATIENT)
Dept: CASE MANAGEMENT | Age: 79
End: 2024-07-24

## 2024-07-24 NOTE — PROGRESS NOTES
Name: Erin Hanna  : 1945  MRN: [id number]    Oncology Navigation Follow-Up Note    Contact Type:  Telephone    Notes: Bart(Radiation) contacted zheng informing had made contact with her daughter-decided to go with hospice. Zheng informed Med Onc office.     Electronically signed by Dede Oliveros RN on 2024 at 1:16 PM

## 2024-07-24 NOTE — TELEPHONE ENCOUNTER
Home Disposition: Lives in Assisted Living Facility-   [facility]-[phone]    Appt made with [facility].

## 2024-08-05 PROBLEM — I50.22 CHRONIC SYSTOLIC CHF (CONGESTIVE HEART FAILURE) (HCC): Status: ACTIVE | Noted: 2024-08-05

## 2024-10-07 ENCOUNTER — TELEPHONE (OUTPATIENT)
Dept: NEPHROLOGY | Age: 79
End: 2024-10-07

## 2024-10-07 NOTE — TELEPHONE ENCOUNTER
Called to confirm appt for Erin. Spoke with her daughter Kari Delgado who informed me that her mom passed away on 7/29/24.

## 2025-03-03 NOTE — PROCEDURES
PROCEDURE NOTE  Date: 6/29/2024   Name: Erin Hanna  YOB: 1945    Procedures    12 lead EKG completed. Results handed to Emili KLEIN.        Attending with

## (undated) DEVICE — MEDTRONIC NC EUPHORA 4X20MM BALLOON

## (undated) DEVICE — GUIDE EXTENSION CATHETER: Brand: GUIDEZILLA™ II

## (undated) DEVICE — 5FR MEDTRONIC PIG  110CM

## (undated) DEVICE — R2P DESTINATION SLENDER GUIDING SHEATH: Brand: R2P DESTINATION SLENDER

## (undated) DEVICE — MEDTRONIC JL3.5 DIAGNOSTIC CATHETER

## (undated) DEVICE — CATHETER IMAGING DRAGONFLY OPSTAR

## (undated) DEVICE — CATH BLLN ANGIO 4X8MM NC EUPHORIA RX

## (undated) DEVICE — VALVE HEMSTAS W/ GWIRE INSRTN TOOL GRDIAN II NC

## (undated) DEVICE — RUNTHROUGH NS EXTRA FLOPPY PTCA GUIDEWIRE: Brand: RUNTHROUGH

## (undated) DEVICE — 260 CM J TIP WIRE .035

## (undated) DEVICE — GUIDE 6FR JR4 MEDTRONIC 100CM

## (undated) DEVICE — CATH BLLN ANGIO 2X12MM SC EUPHORA RX

## (undated) DEVICE — DEVICE INFL 20ML 30ATM POLYCARB BRL ABS PLUNG DGT DISPLAY